# Patient Record
Sex: MALE | Race: OTHER | Employment: OTHER
[De-identification: names, ages, dates, MRNs, and addresses within clinical notes are randomized per-mention and may not be internally consistent; named-entity substitution may affect disease eponyms.]

---

## 2017-02-01 ENCOUNTER — OFFICE VISIT (OUTPATIENT)
Dept: FAMILY MEDICINE CLINIC | Facility: CLINIC | Age: 67
End: 2017-02-01

## 2017-02-01 VITALS
WEIGHT: 190 LBS | HEIGHT: 68 IN | BODY MASS INDEX: 28.79 KG/M2 | DIASTOLIC BLOOD PRESSURE: 73 MMHG | SYSTOLIC BLOOD PRESSURE: 148 MMHG | HEART RATE: 105 BPM

## 2017-02-01 DIAGNOSIS — E11.42 DIABETIC POLYNEUROPATHY ASSOCIATED WITH TYPE 2 DIABETES MELLITUS (HCC): ICD-10-CM

## 2017-02-01 DIAGNOSIS — N18.6 TYPE 2 DIABETES MELLITUS WITH CHRONIC KIDNEY DISEASE ON CHRONIC DIALYSIS, UNSPECIFIED LONG TERM INSULIN USE STATUS: ICD-10-CM

## 2017-02-01 DIAGNOSIS — Z99.2 TYPE 2 DIABETES MELLITUS WITH CHRONIC KIDNEY DISEASE ON CHRONIC DIALYSIS, WITHOUT LONG-TERM CURRENT USE OF INSULIN (HCC): ICD-10-CM

## 2017-02-01 DIAGNOSIS — Z09 HOSPITAL DISCHARGE FOLLOW-UP: ICD-10-CM

## 2017-02-01 DIAGNOSIS — E11.22 TYPE 2 DIABETES MELLITUS WITH CHRONIC KIDNEY DISEASE ON CHRONIC DIALYSIS, WITHOUT LONG-TERM CURRENT USE OF INSULIN (HCC): ICD-10-CM

## 2017-02-01 DIAGNOSIS — F25.1 SCHIZOAFFECTIVE DISORDER, DEPRESSIVE TYPE (HCC): ICD-10-CM

## 2017-02-01 DIAGNOSIS — G40.909 SEIZURE DISORDER (HCC): ICD-10-CM

## 2017-02-01 DIAGNOSIS — N18.4 CHRONIC KIDNEY DISEASE (CKD), STAGE IV (SEVERE) (HCC): ICD-10-CM

## 2017-02-01 DIAGNOSIS — I10 ESSENTIAL HYPERTENSION: Primary | ICD-10-CM

## 2017-02-01 DIAGNOSIS — Z99.2 TYPE 2 DIABETES MELLITUS WITH CHRONIC KIDNEY DISEASE ON CHRONIC DIALYSIS, UNSPECIFIED LONG TERM INSULIN USE STATUS: ICD-10-CM

## 2017-02-01 DIAGNOSIS — G71.11 PROXIMAL MYOTONIC MYOPATHY (HCC): ICD-10-CM

## 2017-02-01 DIAGNOSIS — E11.22 TYPE 2 DIABETES MELLITUS WITH CHRONIC KIDNEY DISEASE ON CHRONIC DIALYSIS, UNSPECIFIED LONG TERM INSULIN USE STATUS: ICD-10-CM

## 2017-02-01 DIAGNOSIS — R26.81 UNSTABLE GAIT: ICD-10-CM

## 2017-02-01 DIAGNOSIS — E78.2 MIXED HYPERLIPIDEMIA: ICD-10-CM

## 2017-02-01 DIAGNOSIS — E66.09 NON MORBID OBESITY DUE TO EXCESS CALORIES: ICD-10-CM

## 2017-02-01 DIAGNOSIS — D64.9 NORMOCHROMIC NORMOCYTIC ANEMIA: ICD-10-CM

## 2017-02-01 DIAGNOSIS — C64.1 RENAL CELL CARCINOMA, RIGHT (HCC): ICD-10-CM

## 2017-02-01 DIAGNOSIS — N18.6 ESRD ON HEMODIALYSIS (HCC): ICD-10-CM

## 2017-02-01 DIAGNOSIS — N18.6 TYPE 2 DIABETES MELLITUS WITH CHRONIC KIDNEY DISEASE ON CHRONIC DIALYSIS, WITHOUT LONG-TERM CURRENT USE OF INSULIN (HCC): ICD-10-CM

## 2017-02-01 DIAGNOSIS — Z99.2 ESRD ON HEMODIALYSIS (HCC): ICD-10-CM

## 2017-02-01 PROCEDURE — G8482 FLU IMMUNIZE ORDER/ADMIN: HCPCS | Performed by: FAMILY MEDICINE

## 2017-02-01 PROCEDURE — 4040F PNEUMOC VAC/ADMIN/RCVD: CPT | Performed by: FAMILY MEDICINE

## 2017-02-01 PROCEDURE — 3044F HG A1C LEVEL LT 7.0%: CPT | Performed by: FAMILY MEDICINE

## 2017-02-01 PROCEDURE — G8427 DOCREV CUR MEDS BY ELIG CLIN: HCPCS | Performed by: FAMILY MEDICINE

## 2017-02-01 PROCEDURE — G8420 CALC BMI NORM PARAMETERS: HCPCS | Performed by: FAMILY MEDICINE

## 2017-02-01 PROCEDURE — 1036F TOBACCO NON-USER: CPT | Performed by: FAMILY MEDICINE

## 2017-02-01 PROCEDURE — 3017F COLORECTAL CA SCREEN DOC REV: CPT | Performed by: FAMILY MEDICINE

## 2017-02-01 PROCEDURE — G8598 ASA/ANTIPLAT THER USED: HCPCS | Performed by: FAMILY MEDICINE

## 2017-02-01 PROCEDURE — 99214 OFFICE O/P EST MOD 30 MIN: CPT | Performed by: FAMILY MEDICINE

## 2017-02-01 PROCEDURE — 1123F ACP DISCUSS/DSCN MKR DOCD: CPT | Performed by: FAMILY MEDICINE

## 2017-02-01 ASSESSMENT — ENCOUNTER SYMPTOMS
CHEST TIGHTNESS: 0
NAUSEA: 0
SORE THROAT: 0
ABDOMINAL PAIN: 0
BACK PAIN: 0
RHINORRHEA: 0
TROUBLE SWALLOWING: 0
DIARRHEA: 0
CONSTIPATION: 0
SHORTNESS OF BREATH: 0
COUGH: 0

## 2017-02-01 ASSESSMENT — PATIENT HEALTH QUESTIONNAIRE - PHQ9
SUM OF ALL RESPONSES TO PHQ QUESTIONS 1-9: 0
2. FEELING DOWN, DEPRESSED OR HOPELESS: 0
SUM OF ALL RESPONSES TO PHQ9 QUESTIONS 1 & 2: 0
1. LITTLE INTEREST OR PLEASURE IN DOING THINGS: 0

## 2017-02-14 LAB
BASOPHILS ABSOLUTE: 0 /ΜL
BASOPHILS RELATIVE PERCENT: 0.9 %
CHOLESTEROL, TOTAL: 105 MG/DL
CHOLESTEROL/HDL RATIO: 2.1
EOSINOPHILS ABSOLUTE: 0.2 /ΜL
EOSINOPHILS RELATIVE PERCENT: 4.1 %
HBA1C MFR BLD: 5 %
HCT VFR BLD CALC: 31.2 % (ref 41–53)
HDLC SERPL-MCNC: 49 MG/DL (ref 35–70)
HEMOGLOBIN: 10.7 G/DL (ref 13.5–17.5)
LDL CHOLESTEROL CALCULATED: 37 MG/DL (ref 0–160)
LYMPHOCYTES ABSOLUTE: 1.2 /ΜL
LYMPHOCYTES RELATIVE PERCENT: 27.9 %
MCH RBC QN AUTO: 33.4 PG
MCHC RBC AUTO-ENTMCNC: 34.3 G/DL
MCV RBC AUTO: 98 FL
MONOCYTES ABSOLUTE: 0.6 /ΜL
MONOCYTES RELATIVE PERCENT: 13 %
NEUTROPHILS ABSOLUTE: 2.4 /ΜL
NEUTROPHILS RELATIVE PERCENT: 54.1 %
PDW BLD-RTO: 14.8 %
PLATELET # BLD: 145 K/ΜL
PMV BLD AUTO: 9 FL
RBC # BLD: 3.2 10^6/ΜL
TRIGL SERPL-MCNC: 96 MG/DL
VLDLC SERPL CALC-MCNC: 19 MG/DL
WBC # BLD: 4.5 10^3/ML

## 2017-02-16 DIAGNOSIS — I10 ESSENTIAL HYPERTENSION: ICD-10-CM

## 2017-02-16 DIAGNOSIS — Z99.2 TYPE 2 DIABETES MELLITUS WITH CHRONIC KIDNEY DISEASE ON CHRONIC DIALYSIS, UNSPECIFIED LONG TERM INSULIN USE STATUS: ICD-10-CM

## 2017-02-16 DIAGNOSIS — E78.2 MIXED HYPERLIPIDEMIA: ICD-10-CM

## 2017-02-16 DIAGNOSIS — N18.6 TYPE 2 DIABETES MELLITUS WITH CHRONIC KIDNEY DISEASE ON CHRONIC DIALYSIS, UNSPECIFIED LONG TERM INSULIN USE STATUS: ICD-10-CM

## 2017-02-16 DIAGNOSIS — E11.22 TYPE 2 DIABETES MELLITUS WITH CHRONIC KIDNEY DISEASE ON CHRONIC DIALYSIS, UNSPECIFIED LONG TERM INSULIN USE STATUS: ICD-10-CM

## 2017-02-28 RX ORDER — RIVASTIGMINE 4.6 MG/24H
PATCH, EXTENDED RELEASE TRANSDERMAL
Qty: 30 PATCH | Refills: 0 | Status: SHIPPED | OUTPATIENT
Start: 2017-02-28 | End: 2017-04-01 | Stop reason: SDUPTHER

## 2017-03-15 ENCOUNTER — HOSPITAL ENCOUNTER (OUTPATIENT)
Dept: GENERAL RADIOLOGY | Age: 67
Discharge: HOME | End: 2017-03-15
Payer: MEDICARE

## 2017-03-15 ENCOUNTER — HOSPITAL ENCOUNTER (OUTPATIENT)
Dept: CT IMAGING | Age: 67
Discharge: HOME | End: 2017-03-15
Payer: MEDICARE

## 2017-03-15 DIAGNOSIS — R13.10 PAIN WITH SWALLOWING: ICD-10-CM

## 2017-03-15 DIAGNOSIS — R13.10 PAINFUL SWALLOWING: ICD-10-CM

## 2017-03-15 DIAGNOSIS — K22.2 STRICTURE ESOPHAGUS: ICD-10-CM

## 2017-03-15 LAB
BUN BLDV-MCNC: 21 MG/DL (ref 8–23)
CREAT SERPL-MCNC: 4.48 MG/DL (ref 0.7–1.2)
GFR AFRICAN AMERICAN: 16 ML/MIN
GFR NON-AFRICAN AMERICAN: 13 ML/MIN
GFR SERPL CREATININE-BSD FRML MDRD: ABNORMAL ML/MIN/{1.73_M2}
GFR SERPL CREATININE-BSD FRML MDRD: ABNORMAL ML/MIN/{1.73_M2}

## 2017-03-15 PROCEDURE — 36415 COLL VENOUS BLD VENIPUNCTURE: CPT

## 2017-03-15 PROCEDURE — 71250 CT THORAX DX C-: CPT

## 2017-03-15 PROCEDURE — 82565 ASSAY OF CREATININE: CPT

## 2017-03-15 PROCEDURE — 84520 ASSAY OF UREA NITROGEN: CPT

## 2017-03-15 PROCEDURE — 6360000004 HC RX CONTRAST MEDICATION: Performed by: INTERNAL MEDICINE

## 2017-03-15 PROCEDURE — 74150 CT ABDOMEN W/O CONTRAST: CPT

## 2017-03-15 RX ORDER — 0.9 % SODIUM CHLORIDE 0.9 %
100 INTRAVENOUS SOLUTION INTRAVENOUS ONCE
Status: DISCONTINUED | OUTPATIENT
Start: 2017-03-15 | End: 2017-03-15

## 2017-03-15 RX ORDER — SODIUM CHLORIDE 0.9 % (FLUSH) 0.9 %
10 SYRINGE (ML) INJECTION PRN
Status: DISCONTINUED | OUTPATIENT
Start: 2017-03-15 | End: 2017-03-15

## 2017-03-15 RX ADMIN — IOHEXOL 50 ML: 240 INJECTION, SOLUTION INTRATHECAL; INTRAVASCULAR; INTRAVENOUS; ORAL at 10:09

## 2017-04-03 RX ORDER — RIVASTIGMINE 4.6 MG/24H
PATCH, EXTENDED RELEASE TRANSDERMAL
Qty: 30 PATCH | Refills: 0 | Status: SHIPPED | OUTPATIENT
Start: 2017-04-03 | End: 2017-07-18 | Stop reason: SDUPTHER

## 2017-04-19 ENCOUNTER — TELEPHONE (OUTPATIENT)
Dept: FAMILY MEDICINE CLINIC | Age: 67
End: 2017-04-19

## 2017-06-14 ENCOUNTER — TELEPHONE (OUTPATIENT)
Dept: FAMILY MEDICINE CLINIC | Age: 67
End: 2017-06-14

## 2017-06-19 ENCOUNTER — OFFICE VISIT (OUTPATIENT)
Dept: FAMILY MEDICINE CLINIC | Age: 67
End: 2017-06-19
Payer: MEDICARE

## 2017-06-19 VITALS
HEART RATE: 93 BPM | SYSTOLIC BLOOD PRESSURE: 120 MMHG | BODY MASS INDEX: 29.73 KG/M2 | WEIGHT: 185 LBS | HEIGHT: 66 IN | DIASTOLIC BLOOD PRESSURE: 60 MMHG

## 2017-06-19 DIAGNOSIS — E11.22 TYPE 2 DIABETES MELLITUS WITH CHRONIC KIDNEY DISEASE ON CHRONIC DIALYSIS, WITHOUT LONG-TERM CURRENT USE OF INSULIN (HCC): ICD-10-CM

## 2017-06-19 DIAGNOSIS — L24.9 IRRITANT DERMATITIS: ICD-10-CM

## 2017-06-19 DIAGNOSIS — R53.1 GENERALIZED WEAKNESS: ICD-10-CM

## 2017-06-19 DIAGNOSIS — Z99.2 TYPE 2 DIABETES MELLITUS WITH CHRONIC KIDNEY DISEASE ON CHRONIC DIALYSIS, WITHOUT LONG-TERM CURRENT USE OF INSULIN (HCC): ICD-10-CM

## 2017-06-19 DIAGNOSIS — E66.09 NON MORBID OBESITY DUE TO EXCESS CALORIES: ICD-10-CM

## 2017-06-19 DIAGNOSIS — N18.6 TYPE 2 DIABETES MELLITUS WITH CHRONIC KIDNEY DISEASE ON CHRONIC DIALYSIS, WITHOUT LONG-TERM CURRENT USE OF INSULIN (HCC): ICD-10-CM

## 2017-06-19 DIAGNOSIS — R26.81 UNSTABLE GAIT: ICD-10-CM

## 2017-06-19 DIAGNOSIS — L85.3 DRY SKIN DERMATITIS: ICD-10-CM

## 2017-06-19 DIAGNOSIS — L29.9 PRURITUS: Primary | ICD-10-CM

## 2017-06-19 DIAGNOSIS — C64.1 RENAL CELL CARCINOMA, RIGHT (HCC): ICD-10-CM

## 2017-06-19 PROCEDURE — G8427 DOCREV CUR MEDS BY ELIG CLIN: HCPCS | Performed by: FAMILY MEDICINE

## 2017-06-19 PROCEDURE — 99213 OFFICE O/P EST LOW 20 MIN: CPT | Performed by: FAMILY MEDICINE

## 2017-06-19 PROCEDURE — 3046F HEMOGLOBIN A1C LEVEL >9.0%: CPT | Performed by: FAMILY MEDICINE

## 2017-06-19 PROCEDURE — G8419 CALC BMI OUT NRM PARAM NOF/U: HCPCS | Performed by: FAMILY MEDICINE

## 2017-06-19 PROCEDURE — 1036F TOBACCO NON-USER: CPT | Performed by: FAMILY MEDICINE

## 2017-06-19 PROCEDURE — 4040F PNEUMOC VAC/ADMIN/RCVD: CPT | Performed by: FAMILY MEDICINE

## 2017-06-19 PROCEDURE — 3017F COLORECTAL CA SCREEN DOC REV: CPT | Performed by: FAMILY MEDICINE

## 2017-06-19 PROCEDURE — 1123F ACP DISCUSS/DSCN MKR DOCD: CPT | Performed by: FAMILY MEDICINE

## 2017-06-19 PROCEDURE — G8598 ASA/ANTIPLAT THER USED: HCPCS | Performed by: FAMILY MEDICINE

## 2017-06-19 RX ORDER — MIRTAZAPINE 45 MG/1
45 TABLET, FILM COATED ORAL DAILY
Status: ON HOLD | COMMUNITY
Start: 2017-05-25 | End: 2017-09-06 | Stop reason: HOSPADM

## 2017-06-19 RX ORDER — HYDROXYZINE HYDROCHLORIDE 25 MG/1
25 TABLET, FILM COATED ORAL NIGHTLY PRN
Qty: 30 TABLET | Refills: 1 | Status: SHIPPED | OUTPATIENT
Start: 2017-06-19 | End: 2017-06-29

## 2017-06-19 RX ORDER — CITALOPRAM 40 MG/1
40 TABLET ORAL DAILY
Status: ON HOLD | COMMUNITY
Start: 2017-05-25 | End: 2018-02-11 | Stop reason: HOSPADM

## 2017-06-19 RX ORDER — ZIPRASIDONE HYDROCHLORIDE 40 MG/1
40 CAPSULE ORAL DAILY
Status: ON HOLD | COMMUNITY
Start: 2017-05-25 | End: 2017-09-06 | Stop reason: HOSPADM

## 2017-06-19 ASSESSMENT — PATIENT HEALTH QUESTIONNAIRE - PHQ9
1. LITTLE INTEREST OR PLEASURE IN DOING THINGS: 0
SUM OF ALL RESPONSES TO PHQ9 QUESTIONS 1 & 2: 0
2. FEELING DOWN, DEPRESSED OR HOPELESS: 0
SUM OF ALL RESPONSES TO PHQ QUESTIONS 1-9: 0

## 2017-06-26 ENCOUNTER — TELEPHONE (OUTPATIENT)
Dept: FAMILY MEDICINE CLINIC | Age: 67
End: 2017-06-26

## 2017-06-28 ENCOUNTER — TELEPHONE (OUTPATIENT)
Dept: FAMILY MEDICINE CLINIC | Age: 67
End: 2017-06-28

## 2017-06-29 ENCOUNTER — HOSPITAL ENCOUNTER (EMERGENCY)
Age: 67
Discharge: HOME | End: 2017-06-29
Attending: EMERGENCY MEDICINE
Payer: MEDICARE

## 2017-06-29 ENCOUNTER — APPOINTMENT (OUTPATIENT)
Dept: CT IMAGING | Age: 67
End: 2017-06-29
Payer: MEDICARE

## 2017-06-29 VITALS
BODY MASS INDEX: 28.93 KG/M2 | SYSTOLIC BLOOD PRESSURE: 172 MMHG | RESPIRATION RATE: 12 BRPM | TEMPERATURE: 98.4 F | DIASTOLIC BLOOD PRESSURE: 67 MMHG | HEART RATE: 88 BPM | WEIGHT: 180 LBS | OXYGEN SATURATION: 91 % | HEIGHT: 66 IN

## 2017-06-29 DIAGNOSIS — R11.2 NAUSEA AND VOMITING, INTRACTABILITY OF VOMITING NOT SPECIFIED, UNSPECIFIED VOMITING TYPE: Primary | ICD-10-CM

## 2017-06-29 DIAGNOSIS — K59.00 CONSTIPATION, UNSPECIFIED CONSTIPATION TYPE: ICD-10-CM

## 2017-06-29 PROCEDURE — 99284 EMERGENCY DEPT VISIT MOD MDM: CPT

## 2017-06-29 PROCEDURE — 74176 CT ABD & PELVIS W/O CONTRAST: CPT

## 2017-06-29 PROCEDURE — 96374 THER/PROPH/DIAG INJ IV PUSH: CPT

## 2017-06-29 RX ORDER — ONDANSETRON 4 MG/1
4 TABLET, ORALLY DISINTEGRATING ORAL EVERY 8 HOURS PRN
Qty: 10 TABLET | Refills: 0 | Status: SHIPPED | OUTPATIENT
Start: 2017-06-29 | End: 2017-07-17 | Stop reason: SDUPTHER

## 2017-06-29 RX ORDER — POLYETHYLENE GLYCOL 3350 17 G/17G
17 POWDER, FOR SOLUTION ORAL DAILY
Qty: 1 BOTTLE | Refills: 0 | Status: SHIPPED | OUTPATIENT
Start: 2017-06-29 | End: 2017-07-06

## 2017-06-29 RX ORDER — ONDANSETRON 2 MG/ML
4 INJECTION INTRAMUSCULAR; INTRAVENOUS ONCE
Status: DISCONTINUED | OUTPATIENT
Start: 2017-06-29 | End: 2017-06-29 | Stop reason: HOSPADM

## 2017-06-29 ASSESSMENT — ENCOUNTER SYMPTOMS
DIARRHEA: 0
EYE DISCHARGE: 0
CONSTIPATION: 1
BACK PAIN: 0
EYE PAIN: 0
RHINORRHEA: 0
NAUSEA: 1
ABDOMINAL PAIN: 0
VOMITING: 1
COUGH: 0
SHORTNESS OF BREATH: 0
EYE REDNESS: 0

## 2017-06-30 ENCOUNTER — CARE COORDINATION (OUTPATIENT)
Dept: FAMILY MEDICINE CLINIC | Age: 67
End: 2017-06-30

## 2017-07-05 DIAGNOSIS — L24.9 IRRITANT DERMATITIS: ICD-10-CM

## 2017-07-05 DIAGNOSIS — L29.9 PRURITUS: ICD-10-CM

## 2017-07-05 DIAGNOSIS — L85.3 DRY SKIN DERMATITIS: ICD-10-CM

## 2017-07-12 ENCOUNTER — TELEPHONE (OUTPATIENT)
Dept: FAMILY MEDICINE CLINIC | Age: 67
End: 2017-07-12

## 2017-07-13 ASSESSMENT — ENCOUNTER SYMPTOMS
NAUSEA: 0
COUGH: 1
ABDOMINAL PAIN: 0
BACK PAIN: 0
DIARRHEA: 0
SORE THROAT: 0
RHINORRHEA: 0
SHORTNESS OF BREATH: 0
TROUBLE SWALLOWING: 0
CONSTIPATION: 0
CHEST TIGHTNESS: 0

## 2017-07-17 ENCOUNTER — TELEPHONE (OUTPATIENT)
Dept: FAMILY MEDICINE CLINIC | Age: 67
End: 2017-07-17

## 2017-07-17 ENCOUNTER — OFFICE VISIT (OUTPATIENT)
Dept: FAMILY MEDICINE CLINIC | Age: 67
End: 2017-07-17
Payer: MEDICARE

## 2017-07-17 VITALS
BODY MASS INDEX: 27.46 KG/M2 | DIASTOLIC BLOOD PRESSURE: 77 MMHG | HEIGHT: 68 IN | SYSTOLIC BLOOD PRESSURE: 169 MMHG | WEIGHT: 181.2 LBS | TEMPERATURE: 98.2 F | HEART RATE: 83 BPM
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
## 2017-07-17 DIAGNOSIS — L24.9 IRRITANT DERMATITIS: ICD-10-CM

## 2017-07-17 DIAGNOSIS — R26.81 UNSTABLE GAIT: ICD-10-CM

## 2017-07-17 DIAGNOSIS — R11.0 NAUSEA: ICD-10-CM

## 2017-07-17 DIAGNOSIS — N18.6 TYPE 2 DIABETES MELLITUS WITH CHRONIC KIDNEY DISEASE ON CHRONIC DIALYSIS, WITHOUT LONG-TERM CURRENT USE OF INSULIN (HCC): ICD-10-CM

## 2017-07-17 DIAGNOSIS — N18.6 ESRD ON HEMODIALYSIS (HCC): ICD-10-CM

## 2017-07-17 DIAGNOSIS — S30.0XXA HEMATOMA OF LUMBAR MUSCLE, INITIAL ENCOUNTER: ICD-10-CM

## 2017-07-17 DIAGNOSIS — Z99.2 ESRD ON HEMODIALYSIS (HCC): ICD-10-CM

## 2017-07-17 DIAGNOSIS — E11.22 TYPE 2 DIABETES MELLITUS WITH CHRONIC KIDNEY DISEASE ON CHRONIC DIALYSIS, WITHOUT LONG-TERM CURRENT USE OF INSULIN (HCC): ICD-10-CM

## 2017-07-17 DIAGNOSIS — Z99.2 TYPE 2 DIABETES MELLITUS WITH CHRONIC KIDNEY DISEASE ON CHRONIC DIALYSIS, WITHOUT LONG-TERM CURRENT USE OF INSULIN (HCC): ICD-10-CM

## 2017-07-17 DIAGNOSIS — L29.9 PRURITUS: ICD-10-CM

## 2017-07-17 DIAGNOSIS — H54.7 VISION IMPAIRMENT: ICD-10-CM

## 2017-07-17 DIAGNOSIS — L85.3 DRY SKIN DERMATITIS: ICD-10-CM

## 2017-07-17 PROCEDURE — 1036F TOBACCO NON-USER: CPT | Performed by: FAMILY MEDICINE

## 2017-07-17 PROCEDURE — G8427 DOCREV CUR MEDS BY ELIG CLIN: HCPCS | Performed by: FAMILY MEDICINE

## 2017-07-17 PROCEDURE — G8419 CALC BMI OUT NRM PARAM NOF/U: HCPCS | Performed by: FAMILY MEDICINE

## 2017-07-17 PROCEDURE — G8598 ASA/ANTIPLAT THER USED: HCPCS | Performed by: FAMILY MEDICINE

## 2017-07-17 PROCEDURE — 3046F HEMOGLOBIN A1C LEVEL >9.0%: CPT | Performed by: FAMILY MEDICINE

## 2017-07-17 PROCEDURE — 99214 OFFICE O/P EST MOD 30 MIN: CPT | Performed by: FAMILY MEDICINE

## 2017-07-17 PROCEDURE — 1123F ACP DISCUSS/DSCN MKR DOCD: CPT | Performed by: FAMILY MEDICINE

## 2017-07-17 PROCEDURE — 3017F COLORECTAL CA SCREEN DOC REV: CPT | Performed by: FAMILY MEDICINE

## 2017-07-17 PROCEDURE — 4040F PNEUMOC VAC/ADMIN/RCVD: CPT | Performed by: FAMILY MEDICINE

## 2017-07-17 RX ORDER — HYDROXYZINE HYDROCHLORIDE 25 MG/1
25 TABLET, FILM COATED ORAL NIGHTLY PRN
Status: ON HOLD | COMMUNITY
End: 2017-09-06 | Stop reason: HOSPADM

## 2017-07-17 RX ORDER — POLYETHYLENE GLYCOL 3350 17 G/17G
17 POWDER, FOR SOLUTION ORAL DAILY
COMMUNITY
End: 2017-11-15 | Stop reason: ALTCHOICE

## 2017-07-17 RX ORDER — MEGESTROL ACETATE 40 MG/ML
SUSPENSION ORAL DAILY
Status: ON HOLD | COMMUNITY
End: 2018-02-11 | Stop reason: HOSPADM

## 2017-07-17 RX ORDER — ONDANSETRON 4 MG/1
4 TABLET, ORALLY DISINTEGRATING ORAL EVERY 8 HOURS PRN
Qty: 20 TABLET | Refills: 2 | Status: ON HOLD | OUTPATIENT
Start: 2017-07-17 | End: 2018-02-11 | Stop reason: HOSPADM

## 2017-07-17 RX ORDER — OMEPRAZOLE 20 MG/1
20 CAPSULE, DELAYED RELEASE ORAL DAILY
Qty: 30 CAPSULE | Refills: 3 | Status: SHIPPED | OUTPATIENT
Start: 2017-07-17 | End: 2017-12-15 | Stop reason: SDUPTHER

## 2017-07-19 RX ORDER — RIVASTIGMINE 4.6 MG/24H
PATCH, EXTENDED RELEASE TRANSDERMAL
Qty: 30 PATCH | Refills: 0 | Status: SHIPPED | OUTPATIENT
Start: 2017-07-19 | End: 2017-12-20 | Stop reason: SDUPTHER

## 2017-07-24 ENCOUNTER — TELEPHONE (OUTPATIENT)
Dept: FAMILY MEDICINE CLINIC | Age: 67
End: 2017-07-24

## 2017-07-29 ASSESSMENT — ENCOUNTER SYMPTOMS
TROUBLE SWALLOWING: 0
COUGH: 0
SORE THROAT: 0
RHINORRHEA: 0
ABDOMINAL PAIN: 0
CHEST TIGHTNESS: 0
DIARRHEA: 0
BACK PAIN: 1
SHORTNESS OF BREATH: 1
NAUSEA: 0
CONSTIPATION: 0

## 2017-08-01 ENCOUNTER — APPOINTMENT (OUTPATIENT)
Dept: CT IMAGING | Age: 67
DRG: 100 | End: 2017-08-01
Payer: MEDICARE

## 2017-08-01 ENCOUNTER — APPOINTMENT (OUTPATIENT)
Dept: GENERAL RADIOLOGY | Age: 67
DRG: 100 | End: 2017-08-01
Payer: MEDICARE

## 2017-08-01 ENCOUNTER — HOSPITAL ENCOUNTER (INPATIENT)
Age: 67
LOS: 2 days | Discharge: NURSING, REHAB OR LONG-TERM CARE | DRG: 100 | End: 2017-08-04
Attending: EMERGENCY MEDICINE | Admitting: INTERNAL MEDICINE
Payer: MEDICARE

## 2017-08-01 ENCOUNTER — APPOINTMENT (OUTPATIENT)
Dept: MRI IMAGING | Age: 67
DRG: 100 | End: 2017-08-01
Payer: MEDICARE

## 2017-08-01 DIAGNOSIS — R41.82 ALTERED MENTAL STATUS, UNSPECIFIED ALTERED MENTAL STATUS TYPE: Primary | ICD-10-CM

## 2017-08-01 LAB
ABSOLUTE EOS #: 0.1 K/UL (ref 0–0.4)
ABSOLUTE LYMPH #: 0.5 K/UL (ref 1–4.8)
ABSOLUTE MONO #: 0.4 K/UL (ref 0.1–1.2)
ALBUMIN SERPL-MCNC: 4.2 G/DL (ref 3.5–5.2)
ALBUMIN/GLOBULIN RATIO: 1.3 (ref 1–2.5)
ALLEN TEST: ABNORMAL
ALP BLD-CCNC: 148 U/L (ref 40–129)
ALT SERPL-CCNC: 6 U/L (ref 5–41)
ANION GAP SERPL CALCULATED.3IONS-SCNC: 17 MMOL/L (ref 9–17)
ANION GAP: 6 MMOL/L (ref 7–16)
AST SERPL-CCNC: 18 U/L
BASOPHILS # BLD: 2 %
BASOPHILS ABSOLUTE: 0.1 K/UL (ref 0–0.2)
BILIRUB SERPL-MCNC: 0.48 MG/DL (ref 0.3–1.2)
BUN BLDV-MCNC: 10 MG/DL (ref 8–23)
BUN/CREAT BLD: ABNORMAL (ref 9–20)
CALCIUM SERPL-MCNC: 9.8 MG/DL (ref 8.6–10.4)
CHLORIDE BLD-SCNC: 97 MMOL/L (ref 98–107)
CO2: 29 MMOL/L (ref 20–31)
CREAT SERPL-MCNC: 2.76 MG/DL (ref 0.7–1.2)
DIFFERENTIAL TYPE: ABNORMAL
EOSINOPHILS RELATIVE PERCENT: 4 %
FIO2: ABNORMAL
FOLATE: >20 NG/ML
GFR AFRICAN AMERICAN: 28 ML/MIN
GFR NON-AFRICAN AMERICAN: 23 ML/MIN
GFR NON-AFRICAN AMERICAN: NORMAL ML/MIN
GFR SERPL CREATININE-BSD FRML MDRD: ABNORMAL ML/MIN/{1.73_M2}
GFR SERPL CREATININE-BSD FRML MDRD: ABNORMAL ML/MIN/{1.73_M2}
GFR SERPL CREATININE-BSD FRML MDRD: NORMAL ML/MIN
GFR SERPL CREATININE-BSD FRML MDRD: NORMAL ML/MIN/{1.73_M2}
GLUCOSE BLD-MCNC: 87 MG/DL (ref 74–100)
GLUCOSE BLD-MCNC: 88 MG/DL (ref 70–99)
HCO3 VENOUS: 32.2 MMOL/L (ref 22–29)
HCT VFR BLD CALC: 35 % (ref 41–53)
HEMOGLOBIN: 11.6 G/DL (ref 13.5–17.5)
INR BLD: 1
LYMPHOCYTES # BLD: 16 %
MCH RBC QN AUTO: 31.6 PG (ref 26–34)
MCHC RBC AUTO-ENTMCNC: 33.3 G/DL (ref 31–37)
MCV RBC AUTO: 94.9 FL (ref 80–100)
MODE: ABNORMAL
MONOCYTES # BLD: 11 %
NEGATIVE BASE EXCESS, VEN: ABNORMAL (ref 0–2)
O2 DEVICE/FLOW/%: ABNORMAL
O2 SAT, VEN: 71 % (ref 60–85)
PATIENT TEMP: ABNORMAL
PCO2, VEN: 57.7 MM HG (ref 41–51)
PDW BLD-RTO: 16.3 % (ref 12.5–15.4)
PH VENOUS: 7.35 (ref 7.32–7.43)
PHENYTOIN FREE: 0.3 UG/ML (ref 1–2)
PLATELET # BLD: 144 K/UL (ref 140–450)
PLATELET ESTIMATE: ABNORMAL
PMV BLD AUTO: 8.1 FL (ref 6–12)
PO2, VEN: 40 MM HG (ref 30–50)
POC CHLORIDE: 99 MMOL/L (ref 98–107)
POC CREATININE: NORMAL MG/DL (ref 0.51–1.19)
POC HEMATOCRIT: 37 % (ref 41–53)
POC HEMOGLOBIN: 12.5 G/DL (ref 13.5–17.5)
POC IONIZED CALCIUM: 1.15 MMOL/L (ref 1.15–1.33)
POC LACTIC ACID: 1.21 MMOL/L (ref 0.56–1.39)
POC PCO2 TEMP: ABNORMAL MM HG
POC PH TEMP: ABNORMAL
POC PO2 TEMP: ABNORMAL MM HG
POC POTASSIUM: 4.2 MMOL/L (ref 3.5–4.5)
POC SODIUM: 137 MMOL/L (ref 138–146)
POSITIVE BASE EXCESS, VEN: 5 (ref 0–3)
POTASSIUM SERPL-SCNC: 4.7 MMOL/L (ref 3.7–5.3)
PROTHROMBIN TIME: 11.1 SEC (ref 9.4–12.6)
RBC # BLD: 3.68 M/UL (ref 4.5–5.9)
RBC # BLD: ABNORMAL 10*6/UL
SAMPLE SITE: ABNORMAL
SEDIMENTATION RATE, ERYTHROCYTE: 12 MM (ref 0–10)
SEG NEUTROPHILS: 67 %
SEGMENTED NEUTROPHILS ABSOLUTE COUNT: 2.3 K/UL (ref 1.8–7.7)
SODIUM BLD-SCNC: 143 MMOL/L (ref 135–144)
T. PALLIDUM, IGG: NONREACTIVE
TOTAL CO2, VENOUS: 34 MMOL/L (ref 23–30)
TOTAL PROTEIN: 7.5 G/DL (ref 6.4–8.3)
TSH SERPL DL<=0.05 MIU/L-ACNC: 1.78 MIU/L (ref 0.3–5)
VITAMIN B-12: 794 PG/ML (ref 211–946)
WBC # BLD: 3.4 K/UL (ref 3.5–11)
WBC # BLD: ABNORMAL 10*3/UL

## 2017-08-01 PROCEDURE — 80186 ASSAY OF PHENYTOIN FREE: CPT

## 2017-08-01 PROCEDURE — 71010 XR CHEST PORTABLE: CPT

## 2017-08-01 PROCEDURE — 85610 PROTHROMBIN TIME: CPT

## 2017-08-01 PROCEDURE — 82803 BLOOD GASES ANY COMBINATION: CPT

## 2017-08-01 PROCEDURE — 72141 MRI NECK SPINE W/O DYE: CPT

## 2017-08-01 PROCEDURE — 86780 TREPONEMA PALLIDUM: CPT

## 2017-08-01 PROCEDURE — G0378 HOSPITAL OBSERVATION PER HR: HCPCS

## 2017-08-01 PROCEDURE — 82435 ASSAY OF BLOOD CHLORIDE: CPT

## 2017-08-01 PROCEDURE — 85651 RBC SED RATE NONAUTOMATED: CPT

## 2017-08-01 PROCEDURE — 84295 ASSAY OF SERUM SODIUM: CPT

## 2017-08-01 PROCEDURE — 85025 COMPLETE CBC W/AUTO DIFF WBC: CPT

## 2017-08-01 PROCEDURE — 70450 CT HEAD/BRAIN W/O DYE: CPT

## 2017-08-01 PROCEDURE — 95819 EEG AWAKE AND ASLEEP: CPT

## 2017-08-01 PROCEDURE — 6370000000 HC RX 637 (ALT 250 FOR IP): Performed by: INTERNAL MEDICINE

## 2017-08-01 PROCEDURE — 80053 COMPREHEN METABOLIC PANEL: CPT

## 2017-08-01 PROCEDURE — 99285 EMERGENCY DEPT VISIT HI MDM: CPT

## 2017-08-01 PROCEDURE — 84443 ASSAY THYROID STIM HORMONE: CPT

## 2017-08-01 PROCEDURE — 82947 ASSAY GLUCOSE BLOOD QUANT: CPT

## 2017-08-01 PROCEDURE — 82330 ASSAY OF CALCIUM: CPT

## 2017-08-01 PROCEDURE — 93005 ELECTROCARDIOGRAM TRACING: CPT

## 2017-08-01 PROCEDURE — 83605 ASSAY OF LACTIC ACID: CPT

## 2017-08-01 PROCEDURE — 84132 ASSAY OF SERUM POTASSIUM: CPT

## 2017-08-01 PROCEDURE — 70551 MRI BRAIN STEM W/O DYE: CPT

## 2017-08-01 PROCEDURE — 99222 1ST HOSP IP/OBS MODERATE 55: CPT | Performed by: NURSE PRACTITIONER

## 2017-08-01 PROCEDURE — 82565 ASSAY OF CREATININE: CPT

## 2017-08-01 PROCEDURE — 6360000002 HC RX W HCPCS: Performed by: INTERNAL MEDICINE

## 2017-08-01 PROCEDURE — 82607 VITAMIN B-12: CPT

## 2017-08-01 PROCEDURE — 82746 ASSAY OF FOLIC ACID SERUM: CPT

## 2017-08-01 PROCEDURE — 85014 HEMATOCRIT: CPT

## 2017-08-01 PROCEDURE — 36415 COLL VENOUS BLD VENIPUNCTURE: CPT

## 2017-08-01 RX ORDER — LISINOPRIL 5 MG/1
5 TABLET ORAL DAILY
Status: DISCONTINUED | OUTPATIENT
Start: 2017-08-01 | End: 2017-08-02

## 2017-08-01 RX ORDER — CLOPIDOGREL BISULFATE 75 MG/1
75 TABLET ORAL DAILY
Status: DISCONTINUED | OUTPATIENT
Start: 2017-08-01 | End: 2017-08-05 | Stop reason: HOSPADM

## 2017-08-01 RX ORDER — SIMVASTATIN 10 MG
10 TABLET ORAL NIGHTLY
Status: DISCONTINUED | OUTPATIENT
Start: 2017-08-01 | End: 2017-08-05 | Stop reason: HOSPADM

## 2017-08-01 RX ORDER — ZIPRASIDONE HYDROCHLORIDE 40 MG/1
40 CAPSULE ORAL DAILY
Status: DISCONTINUED | OUTPATIENT
Start: 2017-08-01 | End: 2017-08-05 | Stop reason: HOSPADM

## 2017-08-01 RX ORDER — PANTOPRAZOLE SODIUM 40 MG/1
40 TABLET, DELAYED RELEASE ORAL
Status: DISCONTINUED | OUTPATIENT
Start: 2017-08-02 | End: 2017-08-05 | Stop reason: HOSPADM

## 2017-08-01 RX ORDER — HYDROXYZINE HYDROCHLORIDE 25 MG/1
25 TABLET, FILM COATED ORAL NIGHTLY PRN
Status: DISCONTINUED | OUTPATIENT
Start: 2017-08-01 | End: 2017-08-03

## 2017-08-01 RX ORDER — POLYETHYLENE GLYCOL 3350 17 G/17G
17 POWDER, FOR SOLUTION ORAL DAILY
Status: DISCONTINUED | OUTPATIENT
Start: 2017-08-01 | End: 2017-08-05 | Stop reason: HOSPADM

## 2017-08-01 RX ORDER — FOLIC ACID 1 MG/1
1 TABLET ORAL DAILY
Status: DISCONTINUED | OUTPATIENT
Start: 2017-08-01 | End: 2017-08-05 | Stop reason: HOSPADM

## 2017-08-01 RX ORDER — FAMOTIDINE 20 MG/1
20 TABLET, FILM COATED ORAL EVERY 24 HOURS
Status: DISCONTINUED | OUTPATIENT
Start: 2017-08-01 | End: 2017-08-05 | Stop reason: HOSPADM

## 2017-08-01 RX ORDER — CITALOPRAM 20 MG/1
40 TABLET ORAL DAILY
Status: DISCONTINUED | OUTPATIENT
Start: 2017-08-01 | End: 2017-08-05 | Stop reason: HOSPADM

## 2017-08-01 RX ORDER — ONDANSETRON 4 MG/1
4 TABLET, FILM COATED ORAL EVERY 8 HOURS PRN
Status: DISCONTINUED | OUTPATIENT
Start: 2017-08-01 | End: 2017-08-05 | Stop reason: HOSPADM

## 2017-08-01 RX ORDER — HEPARIN SODIUM 5000 [USP'U]/ML
5000 INJECTION, SOLUTION INTRAVENOUS; SUBCUTANEOUS EVERY 8 HOURS SCHEDULED
Status: DISCONTINUED | OUTPATIENT
Start: 2017-08-01 | End: 2017-08-05 | Stop reason: HOSPADM

## 2017-08-01 RX ORDER — MEGESTROL ACETATE 40 MG/ML
400 SUSPENSION ORAL DAILY
Status: DISCONTINUED | OUTPATIENT
Start: 2017-08-01 | End: 2017-08-05 | Stop reason: HOSPADM

## 2017-08-01 RX ORDER — PHENYTOIN SODIUM 100 MG/1
100 CAPSULE, EXTENDED RELEASE ORAL 3 TIMES DAILY
Status: DISCONTINUED | OUTPATIENT
Start: 2017-08-01 | End: 2017-08-02

## 2017-08-01 RX ORDER — LORAZEPAM 2 MG/ML
2 INJECTION INTRAMUSCULAR EVERY 4 HOURS PRN
Status: DISCONTINUED | OUTPATIENT
Start: 2017-08-01 | End: 2017-08-05 | Stop reason: HOSPADM

## 2017-08-01 RX ORDER — RIVASTIGMINE 4.6 MG/24H
1 PATCH, EXTENDED RELEASE TRANSDERMAL DAILY
Status: DISCONTINUED | OUTPATIENT
Start: 2017-08-01 | End: 2017-08-05 | Stop reason: HOSPADM

## 2017-08-01 RX ORDER — METOPROLOL SUCCINATE 25 MG/1
25 TABLET, EXTENDED RELEASE ORAL DAILY
Status: DISCONTINUED | OUTPATIENT
Start: 2017-08-01 | End: 2017-08-05 | Stop reason: HOSPADM

## 2017-08-01 RX ADMIN — EXTENDED PHENYTOIN SODIUM 100 MG: 100 CAPSULE ORAL at 16:50

## 2017-08-01 RX ADMIN — LISINOPRIL 5 MG: 5 TABLET ORAL at 16:52

## 2017-08-01 RX ADMIN — MEGESTROL ACETATE 400 MG: 40 SUSPENSION ORAL at 16:52

## 2017-08-01 RX ADMIN — ZIPRASIDONE HYDROCHLORIDE 40 MG: 40 CAPSULE ORAL at 16:50

## 2017-08-01 RX ADMIN — MIRTAZAPINE 45 MG: 30 TABLET, FILM COATED ORAL at 16:50

## 2017-08-01 RX ADMIN — FOLIC ACID 1 MG: 1 TABLET ORAL at 16:51

## 2017-08-01 RX ADMIN — FAMOTIDINE 20 MG: 20 TABLET, FILM COATED ORAL at 21:04

## 2017-08-01 RX ADMIN — METOPROLOL SUCCINATE 25 MG: 25 TABLET, FILM COATED, EXTENDED RELEASE ORAL at 16:51

## 2017-08-01 RX ADMIN — POLYETHYLENE GLYCOL 3350 17 G: 17 POWDER, FOR SOLUTION ORAL at 16:51

## 2017-08-01 RX ADMIN — HEPARIN SODIUM 5000 UNITS: 5000 INJECTION, SOLUTION INTRAVENOUS; SUBCUTANEOUS at 21:04

## 2017-08-01 RX ADMIN — EXTENDED PHENYTOIN SODIUM 100 MG: 100 CAPSULE ORAL at 21:04

## 2017-08-01 RX ADMIN — SIMVASTATIN 10 MG: 10 TABLET, FILM COATED ORAL at 21:04

## 2017-08-01 RX ADMIN — CLOPIDOGREL 75 MG: 75 TABLET, FILM COATED ORAL at 16:51

## 2017-08-01 RX ADMIN — CITALOPRAM 40 MG: 20 TABLET, FILM COATED ORAL at 16:51

## 2017-08-01 ASSESSMENT — ENCOUNTER SYMPTOMS
BACK PAIN: 0
VOMITING: 0
NAUSEA: 0
SORE THROAT: 0
SHORTNESS OF BREATH: 0
COUGH: 0
WHEEZING: 0
CHEST TIGHTNESS: 0
ABDOMINAL PAIN: 0
TROUBLE SWALLOWING: 0

## 2017-08-02 LAB
ABSOLUTE EOS #: 0.2 K/UL (ref 0–0.4)
ABSOLUTE LYMPH #: 0.7 K/UL (ref 1–4.8)
ABSOLUTE MONO #: 0.4 K/UL (ref 0.1–1.2)
ALBUMIN SERPL-MCNC: 3.4 G/DL (ref 3.5–5.2)
ALBUMIN/GLOBULIN RATIO: 1.2 (ref 1–2.5)
ALP BLD-CCNC: 117 U/L (ref 40–129)
ALT SERPL-CCNC: 5 U/L (ref 5–41)
ANION GAP SERPL CALCULATED.3IONS-SCNC: 11 MMOL/L (ref 9–17)
AST SERPL-CCNC: 14 U/L
BASOPHILS # BLD: 1 %
BASOPHILS ABSOLUTE: 0 K/UL (ref 0–0.2)
BILIRUB SERPL-MCNC: 0.38 MG/DL (ref 0.3–1.2)
BUN BLDV-MCNC: 15 MG/DL (ref 8–23)
BUN/CREAT BLD: ABNORMAL (ref 9–20)
CALCIUM SERPL-MCNC: 9.7 MG/DL (ref 8.6–10.4)
CHLORIDE BLD-SCNC: 97 MMOL/L (ref 98–107)
CO2: 27 MMOL/L (ref 20–31)
CREAT SERPL-MCNC: 3.87 MG/DL (ref 0.7–1.2)
DIFFERENTIAL TYPE: ABNORMAL
EOSINOPHILS RELATIVE PERCENT: 6 %
GFR AFRICAN AMERICAN: 19 ML/MIN
GFR NON-AFRICAN AMERICAN: 16 ML/MIN
GFR SERPL CREATININE-BSD FRML MDRD: ABNORMAL ML/MIN/{1.73_M2}
GFR SERPL CREATININE-BSD FRML MDRD: ABNORMAL ML/MIN/{1.73_M2}
GLUCOSE BLD-MCNC: 73 MG/DL (ref 70–99)
HCT VFR BLD CALC: 30.5 % (ref 41–53)
HEMOGLOBIN: 10.2 G/DL (ref 13.5–17.5)
LYMPHOCYTES # BLD: 21 %
MCH RBC QN AUTO: 32.3 PG (ref 26–34)
MCHC RBC AUTO-ENTMCNC: 33.3 G/DL (ref 31–37)
MCV RBC AUTO: 97 FL (ref 80–100)
MONOCYTES # BLD: 13 %
PDW BLD-RTO: 17.3 % (ref 12.5–15.4)
PHENYTOIN DATE LAST DOSE: ABNORMAL
PHENYTOIN DOSE AMOUNT: ABNORMAL
PHENYTOIN DOSE TIME: ABNORMAL
PHENYTOIN FREE: 0.4 UG/ML (ref 1–2)
PHENYTOIN LEVEL: 2.9 UG/ML (ref 10–20)
PLATELET # BLD: 118 K/UL (ref 140–450)
PLATELET ESTIMATE: ABNORMAL
PMV BLD AUTO: 8.6 FL (ref 6–12)
POTASSIUM SERPL-SCNC: 5.3 MMOL/L (ref 3.7–5.3)
RBC # BLD: 3.14 M/UL (ref 4.5–5.9)
RBC # BLD: ABNORMAL 10*6/UL
SEG NEUTROPHILS: 59 %
SEGMENTED NEUTROPHILS ABSOLUTE COUNT: 2 K/UL (ref 1.8–7.7)
SODIUM BLD-SCNC: 135 MMOL/L (ref 135–144)
TOTAL PROTEIN: 6.2 G/DL (ref 6.4–8.3)
WBC # BLD: 3.3 K/UL (ref 3.5–11)
WBC # BLD: ABNORMAL 10*3/UL

## 2017-08-02 PROCEDURE — 6360000002 HC RX W HCPCS: Performed by: INTERNAL MEDICINE

## 2017-08-02 PROCEDURE — G8978 MOBILITY CURRENT STATUS: HCPCS

## 2017-08-02 PROCEDURE — G0378 HOSPITAL OBSERVATION PER HR: HCPCS

## 2017-08-02 PROCEDURE — 97162 PT EVAL MOD COMPLEX 30 MIN: CPT

## 2017-08-02 PROCEDURE — G8979 MOBILITY GOAL STATUS: HCPCS

## 2017-08-02 PROCEDURE — 80186 ASSAY OF PHENYTOIN FREE: CPT

## 2017-08-02 PROCEDURE — 94762 N-INVAS EAR/PLS OXIMTRY CONT: CPT

## 2017-08-02 PROCEDURE — 6370000000 HC RX 637 (ALT 250 FOR IP): Performed by: NURSE PRACTITIONER

## 2017-08-02 PROCEDURE — 6370000000 HC RX 637 (ALT 250 FOR IP): Performed by: INTERNAL MEDICINE

## 2017-08-02 PROCEDURE — 36415 COLL VENOUS BLD VENIPUNCTURE: CPT

## 2017-08-02 PROCEDURE — 97530 THERAPEUTIC ACTIVITIES: CPT

## 2017-08-02 PROCEDURE — 80185 ASSAY OF PHENYTOIN TOTAL: CPT

## 2017-08-02 PROCEDURE — 85025 COMPLETE CBC W/AUTO DIFF WBC: CPT

## 2017-08-02 PROCEDURE — 99223 1ST HOSP IP/OBS HIGH 75: CPT | Performed by: INTERNAL MEDICINE

## 2017-08-02 PROCEDURE — 80053 COMPREHEN METABOLIC PANEL: CPT

## 2017-08-02 PROCEDURE — 99232 SBSQ HOSP IP/OBS MODERATE 35: CPT | Performed by: NURSE PRACTITIONER

## 2017-08-02 RX ORDER — LISINOPRIL 5 MG/1
5 TABLET ORAL 2 TIMES DAILY
Status: DISCONTINUED | OUTPATIENT
Start: 2017-08-02 | End: 2017-08-03

## 2017-08-02 RX ORDER — 0.9 % SODIUM CHLORIDE 0.9 %
150 INTRAVENOUS SOLUTION INTRAVENOUS PRN
Status: DISCONTINUED | OUTPATIENT
Start: 2017-08-02 | End: 2017-08-05 | Stop reason: HOSPADM

## 2017-08-02 RX ORDER — 0.9 % SODIUM CHLORIDE 0.9 %
250 INTRAVENOUS SOLUTION INTRAVENOUS PRN
Status: DISCONTINUED | OUTPATIENT
Start: 2017-08-02 | End: 2017-08-05 | Stop reason: HOSPADM

## 2017-08-02 RX ORDER — LEVETIRACETAM 500 MG/1
500 TABLET ORAL 2 TIMES DAILY
Status: DISCONTINUED | OUTPATIENT
Start: 2017-08-02 | End: 2017-08-05 | Stop reason: HOSPADM

## 2017-08-02 RX ADMIN — FOLIC ACID 1 MG: 1 TABLET ORAL at 08:17

## 2017-08-02 RX ADMIN — SIMVASTATIN 10 MG: 10 TABLET, FILM COATED ORAL at 21:56

## 2017-08-02 RX ADMIN — LEVETIRACETAM 500 MG: 500 TABLET, FILM COATED ORAL at 21:56

## 2017-08-02 RX ADMIN — LISINOPRIL 5 MG: 5 TABLET ORAL at 17:23

## 2017-08-02 RX ADMIN — HEPARIN SODIUM 5000 UNITS: 5000 INJECTION, SOLUTION INTRAVENOUS; SUBCUTANEOUS at 14:02

## 2017-08-02 RX ADMIN — MEGESTROL ACETATE 400 MG: 40 SUSPENSION ORAL at 08:16

## 2017-08-02 RX ADMIN — CITALOPRAM 40 MG: 20 TABLET, FILM COATED ORAL at 08:17

## 2017-08-02 RX ADMIN — EXTENDED PHENYTOIN SODIUM 100 MG: 100 CAPSULE ORAL at 08:15

## 2017-08-02 RX ADMIN — CLOPIDOGREL 75 MG: 75 TABLET, FILM COATED ORAL at 08:17

## 2017-08-02 RX ADMIN — POLYETHYLENE GLYCOL 3350 17 G: 17 POWDER, FOR SOLUTION ORAL at 08:16

## 2017-08-02 RX ADMIN — FAMOTIDINE 20 MG: 20 TABLET, FILM COATED ORAL at 17:23

## 2017-08-02 RX ADMIN — EXTENDED PHENYTOIN SODIUM 100 MG: 100 CAPSULE ORAL at 14:02

## 2017-08-02 RX ADMIN — ZIPRASIDONE HYDROCHLORIDE 40 MG: 40 CAPSULE ORAL at 08:17

## 2017-08-02 RX ADMIN — METOPROLOL SUCCINATE 25 MG: 25 TABLET, FILM COATED, EXTENDED RELEASE ORAL at 08:17

## 2017-08-02 RX ADMIN — LISINOPRIL 5 MG: 5 TABLET ORAL at 08:16

## 2017-08-02 RX ADMIN — MIRTAZAPINE 45 MG: 30 TABLET, FILM COATED ORAL at 08:17

## 2017-08-02 RX ADMIN — HEPARIN SODIUM 5000 UNITS: 5000 INJECTION, SOLUTION INTRAVENOUS; SUBCUTANEOUS at 06:01

## 2017-08-02 RX ADMIN — PANTOPRAZOLE SODIUM 40 MG: 40 TABLET, DELAYED RELEASE ORAL at 06:01

## 2017-08-03 LAB
ANION GAP SERPL CALCULATED.3IONS-SCNC: 15 MMOL/L (ref 9–17)
BUN BLDV-MCNC: 25 MG/DL (ref 8–23)
BUN/CREAT BLD: ABNORMAL (ref 9–20)
CALCIUM SERPL-MCNC: 9.5 MG/DL (ref 8.6–10.4)
CHLORIDE BLD-SCNC: 97 MMOL/L (ref 98–107)
CO2: 25 MMOL/L (ref 20–31)
CREAT SERPL-MCNC: 5.11 MG/DL (ref 0.7–1.2)
GFR AFRICAN AMERICAN: 14 ML/MIN
GFR NON-AFRICAN AMERICAN: 11 ML/MIN
GFR SERPL CREATININE-BSD FRML MDRD: ABNORMAL ML/MIN/{1.73_M2}
GFR SERPL CREATININE-BSD FRML MDRD: ABNORMAL ML/MIN/{1.73_M2}
GLUCOSE BLD-MCNC: 92 MG/DL (ref 70–99)
HCT VFR BLD CALC: 27.8 % (ref 41–53)
HEMOGLOBIN: 9.2 G/DL (ref 13.5–17.5)
MCH RBC QN AUTO: 31.2 PG (ref 26–34)
MCHC RBC AUTO-ENTMCNC: 33.1 G/DL (ref 31–37)
MCV RBC AUTO: 94.4 FL (ref 80–100)
PDW BLD-RTO: 16.2 % (ref 12.5–15.4)
PLATELET # BLD: 131 K/UL (ref 140–450)
PMV BLD AUTO: 7.8 FL (ref 6–12)
POTASSIUM SERPL-SCNC: 4.7 MMOL/L (ref 3.7–5.3)
RBC # BLD: 2.94 M/UL (ref 4.5–5.9)
SODIUM BLD-SCNC: 137 MMOL/L (ref 135–144)
WBC # BLD: 2.6 K/UL (ref 3.5–11)

## 2017-08-03 PROCEDURE — 85027 COMPLETE CBC AUTOMATED: CPT

## 2017-08-03 PROCEDURE — 5A1D00Z PERFORMANCE OF URINARY FILTRATION, SINGLE: ICD-10-PCS | Performed by: INTERNAL MEDICINE

## 2017-08-03 PROCEDURE — 90937 HEMODIALYSIS REPEATED EVAL: CPT

## 2017-08-03 PROCEDURE — 94762 N-INVAS EAR/PLS OXIMTRY CONT: CPT

## 2017-08-03 PROCEDURE — 6370000000 HC RX 637 (ALT 250 FOR IP): Performed by: INTERNAL MEDICINE

## 2017-08-03 PROCEDURE — 99233 SBSQ HOSP IP/OBS HIGH 50: CPT | Performed by: INTERNAL MEDICINE

## 2017-08-03 PROCEDURE — 1200000000 HC SEMI PRIVATE

## 2017-08-03 PROCEDURE — 6360000002 HC RX W HCPCS: Performed by: NURSE PRACTITIONER

## 2017-08-03 PROCEDURE — 6360000002 HC RX W HCPCS: Performed by: INTERNAL MEDICINE

## 2017-08-03 PROCEDURE — 80048 BASIC METABOLIC PNL TOTAL CA: CPT

## 2017-08-03 PROCEDURE — 99232 SBSQ HOSP IP/OBS MODERATE 35: CPT | Performed by: NURSE PRACTITIONER

## 2017-08-03 PROCEDURE — 6370000000 HC RX 637 (ALT 250 FOR IP): Performed by: NURSE PRACTITIONER

## 2017-08-03 RX ORDER — HYDROXYZINE HYDROCHLORIDE 25 MG/1
25 TABLET, FILM COATED ORAL EVERY 6 HOURS PRN
Status: DISCONTINUED | OUTPATIENT
Start: 2017-08-03 | End: 2017-08-03

## 2017-08-03 RX ORDER — DIPHENHYDRAMINE HYDROCHLORIDE 50 MG/ML
25 INJECTION INTRAMUSCULAR; INTRAVENOUS EVERY 6 HOURS PRN
Status: DISCONTINUED | OUTPATIENT
Start: 2017-08-03 | End: 2017-08-05 | Stop reason: HOSPADM

## 2017-08-03 RX ORDER — LISINOPRIL 10 MG/1
10 TABLET ORAL 2 TIMES DAILY
Status: DISCONTINUED | OUTPATIENT
Start: 2017-08-03 | End: 2017-08-05 | Stop reason: HOSPADM

## 2017-08-03 RX ADMIN — CLOPIDOGREL 75 MG: 75 TABLET, FILM COATED ORAL at 07:47

## 2017-08-03 RX ADMIN — LISINOPRIL 5 MG: 5 TABLET ORAL at 07:47

## 2017-08-03 RX ADMIN — LEVETIRACETAM 500 MG: 500 TABLET, FILM COATED ORAL at 07:47

## 2017-08-03 RX ADMIN — MEGESTROL ACETATE 400 MG: 40 SUSPENSION ORAL at 07:47

## 2017-08-03 RX ADMIN — CITALOPRAM 40 MG: 20 TABLET, FILM COATED ORAL at 07:47

## 2017-08-03 RX ADMIN — DOXERCALCIFEROL 16 MCG: 2 INJECTION, SOLUTION INTRAVENOUS at 11:05

## 2017-08-03 RX ADMIN — HEPARIN SODIUM 5000 UNITS: 5000 INJECTION, SOLUTION INTRAVENOUS; SUBCUTANEOUS at 14:47

## 2017-08-03 RX ADMIN — SIMVASTATIN 10 MG: 10 TABLET, FILM COATED ORAL at 20:28

## 2017-08-03 RX ADMIN — FOLIC ACID 1 MG: 1 TABLET ORAL at 07:47

## 2017-08-03 RX ADMIN — FAMOTIDINE 20 MG: 20 TABLET, FILM COATED ORAL at 17:58

## 2017-08-03 RX ADMIN — DIPHENHYDRAMINE HYDROCHLORIDE 25 MG: 50 INJECTION, SOLUTION INTRAMUSCULAR; INTRAVENOUS at 05:35

## 2017-08-03 RX ADMIN — ZIPRASIDONE HYDROCHLORIDE 40 MG: 40 CAPSULE ORAL at 07:47

## 2017-08-03 RX ADMIN — PANTOPRAZOLE SODIUM 40 MG: 40 TABLET, DELAYED RELEASE ORAL at 07:47

## 2017-08-03 RX ADMIN — LISINOPRIL 10 MG: 10 TABLET ORAL at 17:59

## 2017-08-03 RX ADMIN — LEVETIRACETAM 500 MG: 500 TABLET, FILM COATED ORAL at 20:28

## 2017-08-03 RX ADMIN — METOPROLOL SUCCINATE 25 MG: 25 TABLET, FILM COATED, EXTENDED RELEASE ORAL at 07:47

## 2017-08-03 RX ADMIN — MIRTAZAPINE 45 MG: 30 TABLET, FILM COATED ORAL at 07:47

## 2017-08-03 ASSESSMENT — PAIN SCALES - GENERAL
PAINLEVEL_OUTOF10: 0
PAINLEVEL_OUTOF10: 0

## 2017-08-04 VITALS
TEMPERATURE: 98.2 F | SYSTOLIC BLOOD PRESSURE: 145 MMHG | HEART RATE: 78 BPM | RESPIRATION RATE: 16 BRPM | OXYGEN SATURATION: 98 % | DIASTOLIC BLOOD PRESSURE: 78 MMHG | HEIGHT: 69 IN | WEIGHT: 173.28 LBS | BODY MASS INDEX: 25.67 KG/M2

## 2017-08-04 PROCEDURE — 97110 THERAPEUTIC EXERCISES: CPT

## 2017-08-04 PROCEDURE — 6370000000 HC RX 637 (ALT 250 FOR IP): Performed by: INTERNAL MEDICINE

## 2017-08-04 PROCEDURE — 94762 N-INVAS EAR/PLS OXIMTRY CONT: CPT

## 2017-08-04 PROCEDURE — 6360000002 HC RX W HCPCS: Performed by: NURSE PRACTITIONER

## 2017-08-04 PROCEDURE — 6370000000 HC RX 637 (ALT 250 FOR IP): Performed by: NURSE PRACTITIONER

## 2017-08-04 PROCEDURE — 97535 SELF CARE MNGMENT TRAINING: CPT

## 2017-08-04 PROCEDURE — 97530 THERAPEUTIC ACTIVITIES: CPT

## 2017-08-04 PROCEDURE — 99239 HOSP IP/OBS DSCHRG MGMT >30: CPT | Performed by: INTERNAL MEDICINE

## 2017-08-04 PROCEDURE — 99231 SBSQ HOSP IP/OBS SF/LOW 25: CPT | Performed by: NURSE PRACTITIONER

## 2017-08-04 PROCEDURE — 97166 OT EVAL MOD COMPLEX 45 MIN: CPT

## 2017-08-04 PROCEDURE — G8987 SELF CARE CURRENT STATUS: HCPCS

## 2017-08-04 PROCEDURE — 97116 GAIT TRAINING THERAPY: CPT

## 2017-08-04 PROCEDURE — 6360000002 HC RX W HCPCS: Performed by: INTERNAL MEDICINE

## 2017-08-04 PROCEDURE — G8988 SELF CARE GOAL STATUS: HCPCS

## 2017-08-04 RX ORDER — LEVETIRACETAM 500 MG/1
500 TABLET ORAL 2 TIMES DAILY
Qty: 60 TABLET | Refills: 0 | Status: ON HOLD | OUTPATIENT
Start: 2017-08-04 | End: 2018-02-14 | Stop reason: HOSPADM

## 2017-08-04 RX ORDER — LISINOPRIL 10 MG/1
10 TABLET ORAL 2 TIMES DAILY
Qty: 60 TABLET | Refills: 0 | Status: ON HOLD | OUTPATIENT
Start: 2017-08-04 | End: 2017-09-06 | Stop reason: HOSPADM

## 2017-08-04 RX ADMIN — FOLIC ACID 1 MG: 1 TABLET ORAL at 10:24

## 2017-08-04 RX ADMIN — DIPHENHYDRAMINE HYDROCHLORIDE 25 MG: 50 INJECTION, SOLUTION INTRAMUSCULAR; INTRAVENOUS at 12:52

## 2017-08-04 RX ADMIN — LEVETIRACETAM 500 MG: 500 TABLET, FILM COATED ORAL at 10:24

## 2017-08-04 RX ADMIN — FAMOTIDINE 20 MG: 20 TABLET, FILM COATED ORAL at 17:31

## 2017-08-04 RX ADMIN — POLYETHYLENE GLYCOL 3350 17 G: 17 POWDER, FOR SOLUTION ORAL at 10:24

## 2017-08-04 RX ADMIN — PANTOPRAZOLE SODIUM 40 MG: 40 TABLET, DELAYED RELEASE ORAL at 06:07

## 2017-08-04 RX ADMIN — LEVETIRACETAM 500 MG: 500 TABLET, FILM COATED ORAL at 20:39

## 2017-08-04 RX ADMIN — METOPROLOL SUCCINATE 25 MG: 25 TABLET, FILM COATED, EXTENDED RELEASE ORAL at 10:24

## 2017-08-04 RX ADMIN — DIPHENHYDRAMINE HYDROCHLORIDE 25 MG: 50 INJECTION, SOLUTION INTRAMUSCULAR; INTRAVENOUS at 06:07

## 2017-08-04 RX ADMIN — MEGESTROL ACETATE 400 MG: 40 SUSPENSION ORAL at 10:23

## 2017-08-04 RX ADMIN — ZIPRASIDONE HYDROCHLORIDE 40 MG: 40 CAPSULE ORAL at 10:24

## 2017-08-04 RX ADMIN — HEPARIN SODIUM 5000 UNITS: 5000 INJECTION, SOLUTION INTRAVENOUS; SUBCUTANEOUS at 20:39

## 2017-08-04 RX ADMIN — MIRTAZAPINE 45 MG: 30 TABLET, FILM COATED ORAL at 10:24

## 2017-08-04 RX ADMIN — SIMVASTATIN 10 MG: 10 TABLET, FILM COATED ORAL at 20:39

## 2017-08-04 RX ADMIN — HEPARIN SODIUM 5000 UNITS: 5000 INJECTION, SOLUTION INTRAVENOUS; SUBCUTANEOUS at 13:37

## 2017-08-04 RX ADMIN — LISINOPRIL 10 MG: 10 TABLET ORAL at 17:31

## 2017-08-04 RX ADMIN — CLOPIDOGREL 75 MG: 75 TABLET, FILM COATED ORAL at 10:25

## 2017-08-04 RX ADMIN — CITALOPRAM 40 MG: 20 TABLET, FILM COATED ORAL at 10:25

## 2017-08-04 RX ADMIN — LISINOPRIL 10 MG: 10 TABLET ORAL at 10:24

## 2017-08-04 ASSESSMENT — PAIN SCALES - GENERAL: PAINLEVEL_OUTOF10: 0

## 2017-08-08 ENCOUNTER — CARE COORDINATION (OUTPATIENT)
Dept: CASE MANAGEMENT | Age: 67
End: 2017-08-08

## 2017-08-08 LAB
EKG ATRIAL RATE: 80 BPM
EKG P AXIS: 30 DEGREES
EKG P-R INTERVAL: 196 MS
EKG Q-T INTERVAL: 402 MS
EKG QRS DURATION: 92 MS
EKG QTC CALCULATION (BAZETT): 463 MS
EKG R AXIS: -44 DEGREES
EKG T AXIS: 23 DEGREES
EKG VENTRICULAR RATE: 80 BPM

## 2017-08-11 ENCOUNTER — CARE COORDINATION (OUTPATIENT)
Dept: CARE COORDINATION | Age: 67
End: 2017-08-11

## 2017-08-25 ENCOUNTER — HOSPITAL ENCOUNTER (OUTPATIENT)
Age: 67
Setting detail: SPECIMEN
Discharge: HOME | End: 2017-08-25
Payer: MEDICARE

## 2017-08-25 LAB — PHENYTOIN FREE: 0.2 UG/ML (ref 1–2)

## 2017-08-25 PROCEDURE — 80186 ASSAY OF PHENYTOIN FREE: CPT

## 2017-08-25 PROCEDURE — 36415 COLL VENOUS BLD VENIPUNCTURE: CPT

## 2017-08-25 PROCEDURE — P9603 ONE-WAY ALLOW PRORATED MILES: HCPCS

## 2017-09-02 ENCOUNTER — HOSPITAL ENCOUNTER (OUTPATIENT)
Age: 67
Setting detail: OBSERVATION
Discharge: OTHER INSTITUTION | End: 2017-09-06
Attending: EMERGENCY MEDICINE | Admitting: INTERNAL MEDICINE
Payer: MEDICARE

## 2017-09-02 ENCOUNTER — APPOINTMENT (OUTPATIENT)
Dept: GENERAL RADIOLOGY | Age: 67
End: 2017-09-02
Payer: MEDICARE

## 2017-09-02 ENCOUNTER — APPOINTMENT (OUTPATIENT)
Dept: CT IMAGING | Age: 67
End: 2017-09-02
Payer: MEDICARE

## 2017-09-02 DIAGNOSIS — F32.A DEPRESSION, UNSPECIFIED DEPRESSION TYPE: Primary | ICD-10-CM

## 2017-09-02 DIAGNOSIS — R45.851 SUICIDAL IDEATION: ICD-10-CM

## 2017-09-02 DIAGNOSIS — F05 ACUTE CONFUSIONAL STATE: ICD-10-CM

## 2017-09-02 LAB
ABSOLUTE EOS #: 0.2 K/UL (ref 0–0.4)
ABSOLUTE LYMPH #: 0.5 K/UL (ref 1–4.8)
ABSOLUTE MONO #: 0.4 K/UL (ref 0.1–1.3)
ALBUMIN SERPL-MCNC: 4.3 G/DL (ref 3.5–5.2)
ALBUMIN/GLOBULIN RATIO: NORMAL (ref 1–2.5)
ALP BLD-CCNC: 109 U/L (ref 40–129)
ALT SERPL-CCNC: 9 U/L (ref 5–41)
AMMONIA: 29 UMOL/L (ref 16–60)
ANION GAP SERPL CALCULATED.3IONS-SCNC: 16 MMOL/L (ref 9–17)
AST SERPL-CCNC: 17 U/L
BASOPHILS # BLD: 1 %
BASOPHILS ABSOLUTE: 0 K/UL (ref 0–0.2)
BILIRUB SERPL-MCNC: 0.66 MG/DL (ref 0.3–1.2)
BILIRUBIN DIRECT: 0.26 MG/DL
BILIRUBIN, INDIRECT: 0.4 MG/DL (ref 0–1)
BUN BLDV-MCNC: 11 MG/DL (ref 8–23)
BUN/CREAT BLD: ABNORMAL (ref 9–20)
CALCIUM SERPL-MCNC: 10.3 MG/DL (ref 8.6–10.4)
CHLORIDE BLD-SCNC: 90 MMOL/L (ref 98–107)
CO2: 28 MMOL/L (ref 20–31)
CREAT SERPL-MCNC: 3.08 MG/DL (ref 0.7–1.2)
DIFFERENTIAL TYPE: ABNORMAL
EOSINOPHILS RELATIVE PERCENT: 5 %
GFR AFRICAN AMERICAN: 25 ML/MIN
GFR NON-AFRICAN AMERICAN: 20 ML/MIN
GFR SERPL CREATININE-BSD FRML MDRD: ABNORMAL ML/MIN/{1.73_M2}
GFR SERPL CREATININE-BSD FRML MDRD: ABNORMAL ML/MIN/{1.73_M2}
GLOBULIN: NORMAL G/DL (ref 1.5–3.8)
GLUCOSE BLD-MCNC: 99 MG/DL (ref 70–99)
HCT VFR BLD CALC: 34.3 % (ref 41–53)
HEMOGLOBIN: 11.5 G/DL (ref 13.5–17.5)
LYMPHOCYTES # BLD: 15 %
MAGNESIUM: 2.1 MG/DL (ref 1.6–2.6)
MCH RBC QN AUTO: 31.8 PG (ref 26–34)
MCHC RBC AUTO-ENTMCNC: 33.6 G/DL (ref 31–37)
MCV RBC AUTO: 94.6 FL (ref 80–100)
MONOCYTES # BLD: 11 %
PDW BLD-RTO: 14.1 % (ref 11.5–14.9)
PHOSPHORUS: 3.5 MG/DL (ref 2.5–4.5)
PLATELET # BLD: 160 K/UL (ref 150–450)
PLATELET ESTIMATE: ABNORMAL
PMV BLD AUTO: 8.7 FL (ref 6–12)
POTASSIUM SERPL-SCNC: 3.8 MMOL/L (ref 3.7–5.3)
RBC # BLD: 3.63 M/UL (ref 4.5–5.9)
RBC # BLD: ABNORMAL 10*6/UL
SEG NEUTROPHILS: 68 %
SEGMENTED NEUTROPHILS ABSOLUTE COUNT: 2.4 K/UL (ref 1.3–9.1)
SODIUM BLD-SCNC: 134 MMOL/L (ref 135–144)
TOTAL PROTEIN: 8.1 G/DL (ref 6.4–8.3)
WBC # BLD: 3.5 K/UL (ref 3.5–11)
WBC # BLD: ABNORMAL 10*3/UL

## 2017-09-02 PROCEDURE — 85025 COMPLETE CBC W/AUTO DIFF WBC: CPT

## 2017-09-02 PROCEDURE — 71010 XR CHEST PORTABLE: CPT

## 2017-09-02 PROCEDURE — 83735 ASSAY OF MAGNESIUM: CPT

## 2017-09-02 PROCEDURE — 80076 HEPATIC FUNCTION PANEL: CPT

## 2017-09-02 PROCEDURE — 82140 ASSAY OF AMMONIA: CPT

## 2017-09-02 PROCEDURE — 80186 ASSAY OF PHENYTOIN FREE: CPT

## 2017-09-02 PROCEDURE — 36415 COLL VENOUS BLD VENIPUNCTURE: CPT

## 2017-09-02 PROCEDURE — 70450 CT HEAD/BRAIN W/O DYE: CPT

## 2017-09-02 PROCEDURE — 84100 ASSAY OF PHOSPHORUS: CPT

## 2017-09-02 PROCEDURE — 99285 EMERGENCY DEPT VISIT HI MDM: CPT

## 2017-09-02 PROCEDURE — 80048 BASIC METABOLIC PNL TOTAL CA: CPT

## 2017-09-02 ASSESSMENT — ENCOUNTER SYMPTOMS
SORE THROAT: 0
SHORTNESS OF BREATH: 0
NAUSEA: 0
EYE DISCHARGE: 0
EYE REDNESS: 0
COLOR CHANGE: 0
RHINORRHEA: 0
COUGH: 0
DIARRHEA: 0
VOMITING: 0

## 2017-09-02 ASSESSMENT — PAIN DESCRIPTION - DESCRIPTORS: DESCRIPTORS: ACHING

## 2017-09-02 ASSESSMENT — PAIN SCALES - GENERAL: PAINLEVEL_OUTOF10: 10

## 2017-09-02 ASSESSMENT — PAIN DESCRIPTION - FREQUENCY: FREQUENCY: CONTINUOUS

## 2017-09-03 LAB — PHENYTOIN FREE: 0.4 UG/ML (ref 1–2)

## 2017-09-03 PROCEDURE — 96372 THER/PROPH/DIAG INJ SC/IM: CPT

## 2017-09-03 PROCEDURE — 6370000000 HC RX 637 (ALT 250 FOR IP): Performed by: INTERNAL MEDICINE

## 2017-09-03 PROCEDURE — 2580000003 HC RX 258: Performed by: INTERNAL MEDICINE

## 2017-09-03 PROCEDURE — 6360000002 HC RX W HCPCS: Performed by: INTERNAL MEDICINE

## 2017-09-03 PROCEDURE — 99223 1ST HOSP IP/OBS HIGH 75: CPT | Performed by: INTERNAL MEDICINE

## 2017-09-03 PROCEDURE — G0378 HOSPITAL OBSERVATION PER HR: HCPCS

## 2017-09-03 RX ORDER — PHENYTOIN SODIUM 100 MG/1
100 CAPSULE, EXTENDED RELEASE ORAL 3 TIMES DAILY
Status: DISCONTINUED | OUTPATIENT
Start: 2017-09-03 | End: 2017-09-06 | Stop reason: HOSPADM

## 2017-09-03 RX ORDER — SODIUM CHLORIDE 0.9 % (FLUSH) 0.9 %
10 SYRINGE (ML) INJECTION PRN
Status: DISCONTINUED | OUTPATIENT
Start: 2017-09-03 | End: 2017-09-06 | Stop reason: HOSPADM

## 2017-09-03 RX ORDER — RIVASTIGMINE 4.6 MG/24H
1 PATCH, EXTENDED RELEASE TRANSDERMAL DAILY
Status: DISCONTINUED | OUTPATIENT
Start: 2017-09-03 | End: 2017-09-06 | Stop reason: HOSPADM

## 2017-09-03 RX ORDER — FOLIC ACID 1 MG/1
1 TABLET ORAL DAILY
Status: DISCONTINUED | OUTPATIENT
Start: 2017-09-03 | End: 2017-09-06 | Stop reason: HOSPADM

## 2017-09-03 RX ORDER — ONDANSETRON 4 MG/1
4 TABLET, ORALLY DISINTEGRATING ORAL EVERY 8 HOURS PRN
Status: DISCONTINUED | OUTPATIENT
Start: 2017-09-03 | End: 2017-09-06 | Stop reason: HOSPADM

## 2017-09-03 RX ORDER — SODIUM CHLORIDE 0.9 % (FLUSH) 0.9 %
10 SYRINGE (ML) INJECTION EVERY 12 HOURS SCHEDULED
Status: DISCONTINUED | OUTPATIENT
Start: 2017-09-03 | End: 2017-09-06 | Stop reason: HOSPADM

## 2017-09-03 RX ORDER — HEPARIN SODIUM 5000 [USP'U]/ML
5000 INJECTION, SOLUTION INTRAVENOUS; SUBCUTANEOUS EVERY 8 HOURS SCHEDULED
Status: DISCONTINUED | OUTPATIENT
Start: 2017-09-03 | End: 2017-09-06 | Stop reason: HOSPADM

## 2017-09-03 RX ORDER — FAMOTIDINE 20 MG/1
20 TABLET, FILM COATED ORAL 2 TIMES DAILY
Status: DISCONTINUED | OUTPATIENT
Start: 2017-09-03 | End: 2017-09-04

## 2017-09-03 RX ORDER — METOPROLOL SUCCINATE 25 MG/1
25 TABLET, EXTENDED RELEASE ORAL DAILY
Status: DISCONTINUED | OUTPATIENT
Start: 2017-09-03 | End: 2017-09-06 | Stop reason: HOSPADM

## 2017-09-03 RX ORDER — LEVETIRACETAM 500 MG/1
500 TABLET ORAL 2 TIMES DAILY
Status: DISCONTINUED | OUTPATIENT
Start: 2017-09-03 | End: 2017-09-06 | Stop reason: HOSPADM

## 2017-09-03 RX ORDER — ACETAMINOPHEN 325 MG/1
650 TABLET ORAL EVERY 4 HOURS PRN
Status: DISCONTINUED | OUTPATIENT
Start: 2017-09-03 | End: 2017-09-06 | Stop reason: HOSPADM

## 2017-09-03 RX ORDER — CLOPIDOGREL BISULFATE 75 MG/1
75 TABLET ORAL DAILY
Status: DISCONTINUED | OUTPATIENT
Start: 2017-09-03 | End: 2017-09-06 | Stop reason: HOSPADM

## 2017-09-03 RX ORDER — CITALOPRAM 40 MG/1
40 TABLET ORAL DAILY
Status: DISCONTINUED | OUTPATIENT
Start: 2017-09-03 | End: 2017-09-06 | Stop reason: HOSPADM

## 2017-09-03 RX ADMIN — LEVETIRACETAM 500 MG: 500 TABLET ORAL at 09:59

## 2017-09-03 RX ADMIN — FOLIC ACID 1 MG: 1 TABLET ORAL at 09:59

## 2017-09-03 RX ADMIN — FAMOTIDINE 20 MG: 20 TABLET ORAL at 21:26

## 2017-09-03 RX ADMIN — HEPARIN SODIUM 5000 UNITS: 5000 INJECTION, SOLUTION INTRAVENOUS; SUBCUTANEOUS at 15:06

## 2017-09-03 RX ADMIN — LEVETIRACETAM 500 MG: 500 TABLET ORAL at 21:26

## 2017-09-03 RX ADMIN — CLOPIDOGREL BISULFATE 75 MG: 75 TABLET ORAL at 09:59

## 2017-09-03 RX ADMIN — PHENYTOIN SODIUM 100 MG: 100 CAPSULE ORAL at 09:59

## 2017-09-03 RX ADMIN — PHENYTOIN SODIUM 100 MG: 100 CAPSULE ORAL at 15:06

## 2017-09-03 RX ADMIN — CITALOPRAM HYDROBROMIDE 40 MG: 40 TABLET ORAL at 09:59

## 2017-09-03 RX ADMIN — Medication 10 ML: at 10:01

## 2017-09-03 RX ADMIN — PHENYTOIN SODIUM 100 MG: 100 CAPSULE ORAL at 21:26

## 2017-09-03 RX ADMIN — METOPROLOL SUCCINATE 25 MG: 25 TABLET, EXTENDED RELEASE ORAL at 10:00

## 2017-09-03 RX ADMIN — Medication 10 ML: at 21:27

## 2017-09-03 RX ADMIN — LEVETIRACETAM 500 MG: 500 TABLET ORAL at 02:09

## 2017-09-03 RX ADMIN — FAMOTIDINE 20 MG: 20 TABLET ORAL at 09:59

## 2017-09-03 RX ADMIN — FAMOTIDINE 20 MG: 20 TABLET ORAL at 02:09

## 2017-09-03 RX ADMIN — HEPARIN SODIUM 5000 UNITS: 5000 INJECTION, SOLUTION INTRAVENOUS; SUBCUTANEOUS at 21:27

## 2017-09-03 RX ADMIN — HEPARIN SODIUM 5000 UNITS: 5000 INJECTION, SOLUTION INTRAVENOUS; SUBCUTANEOUS at 06:32

## 2017-09-03 ASSESSMENT — PAIN SCALES - GENERAL
PAINLEVEL_OUTOF10: 0

## 2017-09-04 LAB — GLUCOSE BLD-MCNC: 105 MG/DL (ref 75–110)

## 2017-09-04 PROCEDURE — 6360000002 HC RX W HCPCS: Performed by: INTERNAL MEDICINE

## 2017-09-04 PROCEDURE — G0378 HOSPITAL OBSERVATION PER HR: HCPCS

## 2017-09-04 PROCEDURE — 99232 SBSQ HOSP IP/OBS MODERATE 35: CPT | Performed by: INTERNAL MEDICINE

## 2017-09-04 PROCEDURE — 2580000003 HC RX 258: Performed by: INTERNAL MEDICINE

## 2017-09-04 PROCEDURE — 82947 ASSAY GLUCOSE BLOOD QUANT: CPT

## 2017-09-04 PROCEDURE — 97161 PT EVAL LOW COMPLEX 20 MIN: CPT

## 2017-09-04 PROCEDURE — 96372 THER/PROPH/DIAG INJ SC/IM: CPT

## 2017-09-04 PROCEDURE — 6370000000 HC RX 637 (ALT 250 FOR IP): Performed by: INTERNAL MEDICINE

## 2017-09-04 RX ORDER — FAMOTIDINE 20 MG/1
20 TABLET, FILM COATED ORAL DAILY
Status: DISCONTINUED | OUTPATIENT
Start: 2017-09-05 | End: 2017-09-06

## 2017-09-04 RX ADMIN — PHENYTOIN SODIUM 100 MG: 100 CAPSULE ORAL at 10:20

## 2017-09-04 RX ADMIN — METOPROLOL SUCCINATE 25 MG: 25 TABLET, EXTENDED RELEASE ORAL at 10:20

## 2017-09-04 RX ADMIN — CITALOPRAM HYDROBROMIDE 40 MG: 40 TABLET ORAL at 10:19

## 2017-09-04 RX ADMIN — FAMOTIDINE 20 MG: 20 TABLET ORAL at 10:20

## 2017-09-04 RX ADMIN — HEPARIN SODIUM 5000 UNITS: 5000 INJECTION, SOLUTION INTRAVENOUS; SUBCUTANEOUS at 15:16

## 2017-09-04 RX ADMIN — FOLIC ACID 1 MG: 1 TABLET ORAL at 10:20

## 2017-09-04 RX ADMIN — PHENYTOIN SODIUM 100 MG: 100 CAPSULE ORAL at 20:28

## 2017-09-04 RX ADMIN — Medication 10 ML: at 10:25

## 2017-09-04 RX ADMIN — LEVETIRACETAM 500 MG: 500 TABLET ORAL at 20:28

## 2017-09-04 RX ADMIN — LEVETIRACETAM 500 MG: 500 TABLET ORAL at 10:20

## 2017-09-04 RX ADMIN — HEPARIN SODIUM 5000 UNITS: 5000 INJECTION, SOLUTION INTRAVENOUS; SUBCUTANEOUS at 20:28

## 2017-09-04 RX ADMIN — HEPARIN SODIUM 5000 UNITS: 5000 INJECTION, SOLUTION INTRAVENOUS; SUBCUTANEOUS at 05:45

## 2017-09-04 RX ADMIN — FAMOTIDINE 20 MG: 20 TABLET ORAL at 20:28

## 2017-09-04 RX ADMIN — PHENYTOIN SODIUM 100 MG: 100 CAPSULE ORAL at 15:16

## 2017-09-04 RX ADMIN — CLOPIDOGREL BISULFATE 75 MG: 75 TABLET ORAL at 10:20

## 2017-09-04 RX ADMIN — Medication 10 ML: at 20:33

## 2017-09-04 ASSESSMENT — PAIN SCALES - GENERAL
PAINLEVEL_OUTOF10: 0

## 2017-09-05 PROBLEM — N18.6 ESRD (END STAGE RENAL DISEASE) ON DIALYSIS (HCC): Status: ACTIVE | Noted: 2017-09-05

## 2017-09-05 PROBLEM — F01.50 VASCULAR DEMENTIA WITHOUT BEHAVIORAL DISTURBANCE (HCC): Status: ACTIVE | Noted: 2017-09-05

## 2017-09-05 PROBLEM — Z99.2 ESRD (END STAGE RENAL DISEASE) ON DIALYSIS (HCC): Status: ACTIVE | Noted: 2017-09-05

## 2017-09-05 PROBLEM — Z91.199 NONCOMPLIANCE: Status: ACTIVE | Noted: 2017-09-05

## 2017-09-05 PROBLEM — F05 DELIRIUM DUE TO ANOTHER MEDICAL CONDITION: Status: ACTIVE | Noted: 2017-09-05

## 2017-09-05 LAB
ALBUMIN SERPL-MCNC: 3.1 G/DL (ref 3.5–5.2)
ANION GAP SERPL CALCULATED.3IONS-SCNC: 16 MMOL/L (ref 9–17)
BUN BLDV-MCNC: 24 MG/DL (ref 8–23)
BUN/CREAT BLD: ABNORMAL (ref 9–20)
CALCIUM SERPL-MCNC: 9.2 MG/DL (ref 8.6–10.4)
CHLORIDE BLD-SCNC: 93 MMOL/L (ref 98–107)
CO2: 23 MMOL/L (ref 20–31)
CREAT SERPL-MCNC: 5.62 MG/DL (ref 0.7–1.2)
GFR AFRICAN AMERICAN: 12 ML/MIN
GFR NON-AFRICAN AMERICAN: 10 ML/MIN
GFR SERPL CREATININE-BSD FRML MDRD: ABNORMAL ML/MIN/{1.73_M2}
GFR SERPL CREATININE-BSD FRML MDRD: ABNORMAL ML/MIN/{1.73_M2}
GLUCOSE BLD-MCNC: 87 MG/DL (ref 70–99)
PHENYTOIN DATE LAST DOSE: ABNORMAL
PHENYTOIN DOSE AMOUNT: ABNORMAL
PHENYTOIN DOSE TIME: 2028
PHENYTOIN LEVEL: 2.7 UG/ML (ref 10–20)
PHOSPHORUS: 4.8 MG/DL (ref 2.5–4.5)
POTASSIUM SERPL-SCNC: 3.8 MMOL/L (ref 3.7–5.3)
SODIUM BLD-SCNC: 132 MMOL/L (ref 135–144)

## 2017-09-05 PROCEDURE — 2580000003 HC RX 258: Performed by: INTERNAL MEDICINE

## 2017-09-05 PROCEDURE — 6370000000 HC RX 637 (ALT 250 FOR IP): Performed by: INTERNAL MEDICINE

## 2017-09-05 PROCEDURE — G0257 UNSCHED DIALYSIS ESRD PT HOS: HCPCS

## 2017-09-05 PROCEDURE — G0378 HOSPITAL OBSERVATION PER HR: HCPCS

## 2017-09-05 PROCEDURE — 80185 ASSAY OF PHENYTOIN TOTAL: CPT

## 2017-09-05 PROCEDURE — 8010000000 HC HEMODIALYSIS ACUTE INPT

## 2017-09-05 PROCEDURE — 99233 SBSQ HOSP IP/OBS HIGH 50: CPT | Performed by: INTERNAL MEDICINE

## 2017-09-05 PROCEDURE — 80069 RENAL FUNCTION PANEL: CPT

## 2017-09-05 PROCEDURE — 36415 COLL VENOUS BLD VENIPUNCTURE: CPT

## 2017-09-05 PROCEDURE — 82947 ASSAY GLUCOSE BLOOD QUANT: CPT

## 2017-09-05 RX ORDER — AMLODIPINE BESYLATE 2.5 MG/1
2.5 TABLET ORAL DAILY
Status: DISCONTINUED | OUTPATIENT
Start: 2017-09-05 | End: 2017-09-06

## 2017-09-05 RX ADMIN — LEVETIRACETAM 500 MG: 500 TABLET ORAL at 12:42

## 2017-09-05 RX ADMIN — FAMOTIDINE 20 MG: 20 TABLET ORAL at 12:41

## 2017-09-05 RX ADMIN — CLOPIDOGREL BISULFATE 75 MG: 75 TABLET ORAL at 12:39

## 2017-09-05 RX ADMIN — AMLODIPINE BESYLATE 2.5 MG: 2.5 TABLET ORAL at 23:03

## 2017-09-05 RX ADMIN — FOLIC ACID 1 MG: 1 TABLET ORAL at 12:41

## 2017-09-05 RX ADMIN — PHENYTOIN SODIUM 100 MG: 100 CAPSULE ORAL at 18:18

## 2017-09-05 RX ADMIN — LEVETIRACETAM 500 MG: 500 TABLET ORAL at 20:27

## 2017-09-05 RX ADMIN — PHENYTOIN SODIUM 100 MG: 100 CAPSULE ORAL at 12:42

## 2017-09-05 RX ADMIN — CITALOPRAM HYDROBROMIDE 40 MG: 40 TABLET ORAL at 12:41

## 2017-09-05 RX ADMIN — METOPROLOL SUCCINATE 25 MG: 25 TABLET, EXTENDED RELEASE ORAL at 12:47

## 2017-09-05 RX ADMIN — Medication 10 ML: at 12:44

## 2017-09-05 RX ADMIN — PHENYTOIN SODIUM 100 MG: 100 CAPSULE ORAL at 20:27

## 2017-09-05 RX ADMIN — Medication 10 ML: at 20:27

## 2017-09-05 ASSESSMENT — PAIN SCALES - GENERAL
PAINLEVEL_OUTOF10: 0

## 2017-09-06 VITALS
HEIGHT: 66 IN | WEIGHT: 169.31 LBS | TEMPERATURE: 98 F | SYSTOLIC BLOOD PRESSURE: 164 MMHG | OXYGEN SATURATION: 97 % | HEART RATE: 78 BPM | BODY MASS INDEX: 27.21 KG/M2 | RESPIRATION RATE: 16 BRPM | DIASTOLIC BLOOD PRESSURE: 56 MMHG

## 2017-09-06 LAB
GLUCOSE BLD-MCNC: 81 MG/DL (ref 75–110)
GLUCOSE BLD-MCNC: 90 MG/DL (ref 75–110)

## 2017-09-06 PROCEDURE — G0378 HOSPITAL OBSERVATION PER HR: HCPCS

## 2017-09-06 PROCEDURE — 99232 SBSQ HOSP IP/OBS MODERATE 35: CPT | Performed by: INTERNAL MEDICINE

## 2017-09-06 PROCEDURE — G8987 SELF CARE CURRENT STATUS: HCPCS

## 2017-09-06 PROCEDURE — 82947 ASSAY GLUCOSE BLOOD QUANT: CPT

## 2017-09-06 PROCEDURE — 6370000000 HC RX 637 (ALT 250 FOR IP): Performed by: INTERNAL MEDICINE

## 2017-09-06 PROCEDURE — G8988 SELF CARE GOAL STATUS: HCPCS

## 2017-09-06 PROCEDURE — 2580000003 HC RX 258: Performed by: INTERNAL MEDICINE

## 2017-09-06 PROCEDURE — 96372 THER/PROPH/DIAG INJ SC/IM: CPT

## 2017-09-06 PROCEDURE — 97165 OT EVAL LOW COMPLEX 30 MIN: CPT

## 2017-09-06 PROCEDURE — 6360000002 HC RX W HCPCS: Performed by: INTERNAL MEDICINE

## 2017-09-06 RX ORDER — AMLODIPINE BESYLATE 5 MG/1
5 TABLET ORAL DAILY
Qty: 30 TABLET | Refills: 3 | Status: SHIPPED | OUTPATIENT
Start: 2017-09-07 | End: 2018-08-10 | Stop reason: SDUPTHER

## 2017-09-06 RX ORDER — ACETAMINOPHEN 325 MG/1
650 TABLET ORAL EVERY 6 HOURS PRN
Qty: 120 TABLET | Refills: 3 | Status: SHIPPED | OUTPATIENT
Start: 2017-09-06 | End: 2019-02-25 | Stop reason: SDUPTHER

## 2017-09-06 RX ORDER — MIRTAZAPINE 45 MG/1
45 TABLET, FILM COATED ORAL NIGHTLY
Qty: 30 TABLET | Refills: 3 | Status: SHIPPED | OUTPATIENT
Start: 2017-09-06 | End: 2018-08-06 | Stop reason: SDUPTHER

## 2017-09-06 RX ORDER — FAMOTIDINE 20 MG/1
20 TABLET, FILM COATED ORAL
Status: DISCONTINUED | OUTPATIENT
Start: 2017-09-08 | End: 2017-09-06 | Stop reason: HOSPADM

## 2017-09-06 RX ORDER — ZIPRASIDONE HYDROCHLORIDE 40 MG/1
40 CAPSULE ORAL 2 TIMES DAILY WITH MEALS
Status: DISCONTINUED | OUTPATIENT
Start: 2017-09-06 | End: 2017-09-06 | Stop reason: HOSPADM

## 2017-09-06 RX ORDER — ZIPRASIDONE HYDROCHLORIDE 40 MG/1
40 CAPSULE ORAL 2 TIMES DAILY WITH MEALS
Qty: 60 CAPSULE | Refills: 3 | Status: SHIPPED | OUTPATIENT
Start: 2017-09-06 | End: 2019-02-25 | Stop reason: SDUPTHER

## 2017-09-06 RX ORDER — MIRTAZAPINE 15 MG/1
45 TABLET, FILM COATED ORAL NIGHTLY
Status: DISCONTINUED | OUTPATIENT
Start: 2017-09-06 | End: 2017-09-06 | Stop reason: HOSPADM

## 2017-09-06 RX ORDER — AMLODIPINE BESYLATE 5 MG/1
5 TABLET ORAL DAILY
Status: DISCONTINUED | OUTPATIENT
Start: 2017-09-07 | End: 2017-09-06 | Stop reason: HOSPADM

## 2017-09-06 RX ORDER — PHENYTOIN SODIUM 100 MG/1
100 CAPSULE, EXTENDED RELEASE ORAL 3 TIMES DAILY
Qty: 60 CAPSULE | Refills: 3 | Status: ON HOLD | OUTPATIENT
Start: 2017-09-06 | End: 2018-02-11 | Stop reason: HOSPADM

## 2017-09-06 RX ADMIN — LEVETIRACETAM 500 MG: 500 TABLET ORAL at 09:50

## 2017-09-06 RX ADMIN — CLOPIDOGREL BISULFATE 75 MG: 75 TABLET ORAL at 09:50

## 2017-09-06 RX ADMIN — Medication 10 ML: at 09:56

## 2017-09-06 RX ADMIN — PHENYTOIN SODIUM 100 MG: 100 CAPSULE ORAL at 13:54

## 2017-09-06 RX ADMIN — CITALOPRAM HYDROBROMIDE 40 MG: 40 TABLET ORAL at 09:50

## 2017-09-06 RX ADMIN — PHENYTOIN SODIUM 100 MG: 100 CAPSULE ORAL at 09:50

## 2017-09-06 RX ADMIN — HEPARIN SODIUM 5000 UNITS: 5000 INJECTION, SOLUTION INTRAVENOUS; SUBCUTANEOUS at 13:54

## 2017-09-06 RX ADMIN — METOPROLOL SUCCINATE 25 MG: 25 TABLET, EXTENDED RELEASE ORAL at 09:50

## 2017-09-06 RX ADMIN — AMLODIPINE BESYLATE 2.5 MG: 2.5 TABLET ORAL at 09:50

## 2017-09-06 RX ADMIN — FOLIC ACID 1 MG: 1 TABLET ORAL at 09:50

## 2017-09-08 ENCOUNTER — CARE COORDINATION (OUTPATIENT)
Dept: CASE MANAGEMENT | Age: 67
End: 2017-09-08

## 2017-09-08 LAB — GLUCOSE BLD-MCNC: 111 MG/DL (ref 75–110)

## 2017-09-11 ENCOUNTER — HOSPITAL ENCOUNTER (OUTPATIENT)
Age: 67
Setting detail: SPECIMEN
Discharge: HOME | End: 2017-09-11
Payer: MEDICARE

## 2017-09-12 ENCOUNTER — HOSPITAL ENCOUNTER (OUTPATIENT)
Age: 67
Setting detail: SPECIMEN
Discharge: HOME | End: 2017-09-12
Payer: MEDICARE

## 2017-09-21 ENCOUNTER — CARE COORDINATION (OUTPATIENT)
Dept: CASE MANAGEMENT | Age: 67
End: 2017-09-21

## 2017-09-26 ENCOUNTER — HOSPITAL ENCOUNTER (OUTPATIENT)
Age: 67
Setting detail: SPECIMEN
Discharge: HOME | End: 2017-09-26
Payer: MEDICARE

## 2017-09-26 LAB
DATE, STOOL #1: NORMAL
DATE, STOOL #2: NORMAL
DATE, STOOL #3: NORMAL
HEMOCCULT SP1 STL QL: NEGATIVE
HEMOCCULT SP2 STL QL: NORMAL
HEMOCCULT SP3 STL QL: NORMAL
TIME, STOOL #1: NORMAL
TIME, STOOL #2: NORMAL
TIME, STOOL #3: NORMAL

## 2017-09-26 PROCEDURE — G0328 FECAL BLOOD SCRN IMMUNOASSAY: HCPCS

## 2017-09-28 ENCOUNTER — CARE COORDINATION (OUTPATIENT)
Dept: CASE MANAGEMENT | Age: 67
End: 2017-09-28

## 2017-10-01 ENCOUNTER — HOSPITAL ENCOUNTER (OUTPATIENT)
Age: 67
Setting detail: SPECIMEN
Discharge: HOME | End: 2017-10-01
Payer: MEDICARE

## 2017-10-01 LAB
DATE, STOOL #1: 10
DATE, STOOL #2: NORMAL
DATE, STOOL #3: NORMAL
HEMOCCULT SP1 STL QL: NEGATIVE
HEMOCCULT SP2 STL QL: NORMAL
HEMOCCULT SP3 STL QL: NORMAL
TIME, STOOL #1: 320
TIME, STOOL #2: NORMAL
TIME, STOOL #3: NORMAL

## 2017-10-01 PROCEDURE — 82272 OCCULT BLD FECES 1-3 TESTS: CPT

## 2017-10-12 ENCOUNTER — CARE COORDINATION (OUTPATIENT)
Dept: CASE MANAGEMENT | Age: 67
End: 2017-10-12

## 2017-10-30 ENCOUNTER — CARE COORDINATION (OUTPATIENT)
Dept: CASE MANAGEMENT | Age: 67
End: 2017-10-30

## 2017-10-30 NOTE — CARE COORDINATION
[facility] Transitions Initial Follow Up Call    Call within 2 business days of discharge: Yes    Patient: Karyn Tam Patient : 1950   MRN: [id number]  Reason for Admission: There are no discharge diagnoses documented for the most recent discharge. Discharge Date: 17 RARS: [facility] Risk Score: 32     Spoke with: Coby Harden, regulo    Facility: Discharged from [address] [place] [address] 10/28/17    Non-face-to-face services provided:  Obtained and reviewed discharge summary and/or continuity of care documents    Inpatient Assessment  Care Transitions 24 Hour Call    Schedule Follow Up Appointment with PCP:  Declined  Do you have any ongoing symptoms?:  No  Do you have a copy of your discharge instructions?:  Yes  Do you have all of your prescriptions and are they filled?:  Yes  Have you been contacted by a [facility] Pharmacist?:  No  Have you scheduled your follow up appointment?:  No  Were you discharged with any Home Care or Post Acute Services:  Yes  Post Acute Services:  Home Health (Comment: [id number])  Patient DME:  Al Cat you feel like you have everything you need to keep you well at home?:  Yes  Care Transitions Interventions     Medication discrepancy reported by the daughter. States that she plans on going over all the medications with the Home Health nurse coming in today from [address] [place] [address]. Has Passport Aides from [address] as well. Follow up appointment scheduled. Dialysis continues.       Follow Up  Future Appointments  Date Time Provider Vidhya Odom   2017 2:30 PM MD Dony Diaz RN

## 2017-11-01 ENCOUNTER — CARE COORDINATION (OUTPATIENT)
Dept: CASE MANAGEMENT | Age: 67
End: 2017-11-01

## 2017-11-06 ENCOUNTER — TELEPHONE (OUTPATIENT)
Dept: FAMILY MEDICINE CLINIC | Age: 67
End: 2017-11-06

## 2017-11-09 ENCOUNTER — CARE COORDINATION (OUTPATIENT)
Dept: CASE MANAGEMENT | Age: 67
End: 2017-11-09

## 2017-11-09 NOTE — CARE COORDINATION
[facility] Transitions Follow Up Call    2017    Patient: Franky Crow  Patient : 1950   MRN: [id number]  Reason for Admission: There are no discharge diagnoses documented for the most recent discharge. Discharge Date: 17 RARS: Risk Score: 31  Discharged from [facility] 10/28/17   Spoke with: Parke Claude, daughter    Non-face-to-face services provided:  Obtained and reviewed discharge summary and/or continuity of care documents    Inpatient Assessment  Care Transitions Subsequent and Final Call    Subsequent and Final Calls  Do you have any ongoing symptoms?:  Yes  Onset of Patient-reported symptoms:  Other  Patient-reported symptoms:  Weakness  Interventions for patient-reported symptoms:  Other  Have your medications changed?:  Yes  Patient Reports:  Reports that the PCP nurse called and said to stop the Plavix and Dilantin and will discuss further when patient comes to appointment. Do you have any questions related to your medications?:  No  Do you currently have any active services?:  Yes  Are you currently active with any services?:  Home Health  Do you have any needs or concerns that I can assist you with?:  No  Care Transitions Interventions  Other Interventions:          States fall, no injury. Blood sugars are, \"fine\". Home Health continues to see patient. Care Transition completed. CMRS 10:  CKD, Hospital admissions x 3, ED x 1    Will send case to [facility]  for continued care needs.   Follow Up  Future Appointments  Date Time Provider Vidhya Odom   2017 10:00 AM MD Kirby Fuller RN

## 2017-11-13 ENCOUNTER — TELEPHONE (OUTPATIENT)
Dept: FAMILY MEDICINE CLINIC | Age: 67
End: 2017-11-13

## 2017-11-13 NOTE — TELEPHONE ENCOUNTER
Jules from [place] [address] called and said she was concerned with Mondragon Comes having loose stools and a sore on his mouth

## 2017-11-15 ENCOUNTER — OFFICE VISIT (OUTPATIENT)
Dept: FAMILY MEDICINE CLINIC | Age: 67
End: 2017-11-15
Payer: MEDICARE

## 2017-11-15 VITALS
TEMPERATURE: 97.5 F | DIASTOLIC BLOOD PRESSURE: 60 MMHG | SYSTOLIC BLOOD PRESSURE: 100 MMHG | WEIGHT: 169.31 LBS | BODY MASS INDEX: 27.21 KG/M2 | HEIGHT: 66 IN | OXYGEN SATURATION: 99 % | HEART RATE: 60 BPM

## 2017-11-15 DIAGNOSIS — R53.1 LEFT-SIDED WEAKNESS: ICD-10-CM

## 2017-11-15 DIAGNOSIS — I10 ESSENTIAL HYPERTENSION: Primary | ICD-10-CM

## 2017-11-15 DIAGNOSIS — Z99.3 WHEELCHAIR DEPENDENT: ICD-10-CM

## 2017-11-15 DIAGNOSIS — L29.9 PRURITUS: ICD-10-CM

## 2017-11-15 DIAGNOSIS — Z99.2 TYPE 2 DIABETES MELLITUS WITH CHRONIC KIDNEY DISEASE ON CHRONIC DIALYSIS, WITHOUT LONG-TERM CURRENT USE OF INSULIN (HCC): ICD-10-CM

## 2017-11-15 DIAGNOSIS — C64.1 RENAL CELL CARCINOMA OF RIGHT KIDNEY (HCC): ICD-10-CM

## 2017-11-15 DIAGNOSIS — G71.11 PROXIMAL MYOTONIC MYOPATHY (HCC): ICD-10-CM

## 2017-11-15 DIAGNOSIS — N18.6 ESRD ON HEMODIALYSIS (HCC): ICD-10-CM

## 2017-11-15 DIAGNOSIS — F25.1 SCHIZOAFFECTIVE DISORDER, DEPRESSIVE TYPE (HCC): ICD-10-CM

## 2017-11-15 DIAGNOSIS — I63.9 CEREBRAL INFARCTION, UNSPECIFIED MECHANISM (HCC): ICD-10-CM

## 2017-11-15 DIAGNOSIS — N18.6 TYPE 2 DIABETES MELLITUS WITH CHRONIC KIDNEY DISEASE ON CHRONIC DIALYSIS, WITHOUT LONG-TERM CURRENT USE OF INSULIN (HCC): ICD-10-CM

## 2017-11-15 DIAGNOSIS — R53.1 GENERALIZED WEAKNESS: ICD-10-CM

## 2017-11-15 DIAGNOSIS — Z09 HOSPITAL DISCHARGE FOLLOW-UP: ICD-10-CM

## 2017-11-15 DIAGNOSIS — R26.81 UNSTABLE GAIT: ICD-10-CM

## 2017-11-15 DIAGNOSIS — Z99.2 ESRD ON HEMODIALYSIS (HCC): ICD-10-CM

## 2017-11-15 DIAGNOSIS — E78.2 MIXED HYPERLIPIDEMIA: ICD-10-CM

## 2017-11-15 DIAGNOSIS — E11.22 TYPE 2 DIABETES MELLITUS WITH CHRONIC KIDNEY DISEASE ON CHRONIC DIALYSIS, WITHOUT LONG-TERM CURRENT USE OF INSULIN (HCC): ICD-10-CM

## 2017-11-15 DIAGNOSIS — L85.3 DRY SKIN DERMATITIS: ICD-10-CM

## 2017-11-15 PROCEDURE — 1036F TOBACCO NON-USER: CPT | Performed by: FAMILY MEDICINE

## 2017-11-15 PROCEDURE — G8598 ASA/ANTIPLAT THER USED: HCPCS | Performed by: FAMILY MEDICINE

## 2017-11-15 PROCEDURE — 1123F ACP DISCUSS/DSCN MKR DOCD: CPT | Performed by: FAMILY MEDICINE

## 2017-11-15 PROCEDURE — G8484 FLU IMMUNIZE NO ADMIN: HCPCS | Performed by: FAMILY MEDICINE

## 2017-11-15 PROCEDURE — 3044F HG A1C LEVEL LT 7.0%: CPT | Performed by: FAMILY MEDICINE

## 2017-11-15 PROCEDURE — 99215 OFFICE O/P EST HI 40 MIN: CPT | Performed by: FAMILY MEDICINE

## 2017-11-15 PROCEDURE — 3017F COLORECTAL CA SCREEN DOC REV: CPT | Performed by: FAMILY MEDICINE

## 2017-11-15 PROCEDURE — G8417 CALC BMI ABV UP PARAM F/U: HCPCS | Performed by: FAMILY MEDICINE

## 2017-11-15 PROCEDURE — G8427 DOCREV CUR MEDS BY ELIG CLIN: HCPCS | Performed by: FAMILY MEDICINE

## 2017-11-15 PROCEDURE — 4040F PNEUMOC VAC/ADMIN/RCVD: CPT | Performed by: FAMILY MEDICINE

## 2017-11-15 ASSESSMENT — ENCOUNTER SYMPTOMS
ABDOMINAL PAIN: 0
CHEST TIGHTNESS: 0
SORE THROAT: 0
COUGH: 0
CONSTIPATION: 0
DIARRHEA: 1
SHORTNESS OF BREATH: 0
RHINORRHEA: 0
BACK PAIN: 1
TROUBLE SWALLOWING: 0
NAUSEA: 0

## 2017-11-15 ASSESSMENT — PATIENT HEALTH QUESTIONNAIRE - PHQ9
2. FEELING DOWN, DEPRESSED OR HOPELESS: 0
SUM OF ALL RESPONSES TO PHQ QUESTIONS 1-9: 0
SUM OF ALL RESPONSES TO PHQ9 QUESTIONS 1 & 2: 0
1. LITTLE INTEREST OR PLEASURE IN DOING THINGS: 0

## 2017-11-15 NOTE — PROGRESS NOTES
[address]  [address] [place] [address]  Dept: [phone]  Dept Fax: [phone]    Leticia Gillespie is a 74 y.o. male who presents today for his medical conditions/complaints as noted below. Leticia Gillespie is c/o of Diarrhea (home care concerned has been going on past week)      HPI:     Patient is here for a face-to-face encounter regarding his home healthcare. His daughter is present. States that patient was admitted 3 months ago to the hospital for increasing confusion. He had difficulty in ambulating. He was treated and then transferred to a rehab place for 3 months. Patient did not do well with ambulation. Patient is now home for about 2 weeks now. He is in a wheelchair. He can hardly walk 5-6 steps. Patient states is not confused at present. Has no headaches or dizziness. No chest pain. He has been itching a lot. His skin has been dry. Patient states she has lost weight. Blood pressure is stable. Nonsmoker. Denies alcohol. Denies drugs. Denies caffeinated drinks. O2 sat is 99%. Patient states he has some pain in his mouth and along his gums. Patient also sees his psychiatrist.  He sees his nephrologist.  He goes for hemodialysis.             Social History   Substance Use Topics    Smoking status: Never Smoker    Smokeless tobacco: Never Used    Alcohol use No      Current Outpatient Prescriptions   Medication Sig Dispense Refill    metoprolol succinate (TOPROL XL) 25 MG extended release tablet TAKE 1\2 TABLET IN THE EVENING 30 tablet 0    acetaminophen (TYLENOL) 325 MG tablet Take 2 tablets by mouth every 6 hours as needed for Pain or Fever 120 tablet 3    phenytoin (DILANTIN) 100 MG ER capsule Take 1 capsule by mouth 3 times daily 60 capsule 3    mirtazapine (REMERON) 45 MG tablet Take 1 tablet by mouth nightly 30 tablet 3    ziprasidone (GEODON) 40 MG capsule Take 1 capsule by mouth 2 times daily (with meals) 60 capsule 3 and headaches. Psychiatric/Behavioral: Positive for confusion, decreased concentration, dysphoric mood and sleep disturbance. The patient is nervous/anxious. Objective:     Physical Exam   Constitutional: He is oriented to person, place, and time. He appears well-developed and well-nourished. HENT:   Head: Normocephalic. Right Ear: External ear normal.   Left Ear: External ear normal.   Nose: Mucosal edema present. Mouth/Throat: No oropharyngeal exudate. Nose  is congested and wet. Narrow air passages. Erythema plus. Sinuses are not tender. Patient has dentures which she removes. Gums show minimal erythema. No swelling is noted. Tongue is dry. Eyes: Pupils are equal, round, and reactive to light. No scleral icterus. Neck: Normal range of motion. Neck supple. No JVD present. No thyromegaly present. Neck shows no bruits. Cardiovascular: Normal rate, regular rhythm and normal heart sounds. Exam reveals no gallop. No murmur heard. Pulmonary/Chest: Effort normal.   Breath sounds are diminished. Scattered wheezes. No crackles. Abdominal: Soft. Bowel sounds are normal. He exhibits no mass. There is no tenderness. Liver and spleen are not palpable. Musculoskeletal:        Lumbar back: Normal.   Legs show no edema. Both knees are tender on flexion and extension. Mild crepitations noted. There is weakness of both his legs, left more than right. Strength is 2/5 for his proximal muscles. He is able to raise his arms above his head. No tremors noted. Gait was not tested. Patient is in a wheelchair. Lymphadenopathy:     He has no cervical adenopathy. Neurological: He is alert and oriented to person, place, and time. He has normal reflexes. Coordination and gait normal.   Skin: Skin is warm and dry. Skin is dry along his forearms. Excoriation plus. Skin is also dry over his abdominal wall and his legs. Psychiatric:   Affect is flat. Patient is quiet.   Thought content is difficult to assess. Nursing note and vitals reviewed. /60 (Site: Left Arm, Position: Sitting, Cuff Size: Medium Adult)   Pulse 60   Temp 97.5 °F (36.4 °C) (Oral)   Ht 5' 5.98\" (1.676 m)   Wt 169 lb 5 oz (76.8 kg)   SpO2 99%   BMI 27.34 kg/m²     Assessment:      1. Essential hypertension     2. Generalized weakness     3. Proximal myotonic myopathy (HCC)     4. Unstable gait     5. Dry skin dermatitis     6. Cerebral infarction, unspecified mechanism ([facility]     7. Schizoaffective disorder, depressive type ([facility]     8. Left-sided weakness     9. Mixed hyperlipidemia     10. ESRD on hemodialysis ([facility]     11. Type 2 diabetes mellitus with chronic kidney disease on chronic dialysis, without long-term current use of insulin ([facility]     12. Pruritus     13. Renal cell carcinoma of right kidney (HCC)     14. Wheelchair dependent     15. Hospital discharge follow-up         Plan:      Return in about 2 months (around 1/15/2018). No orders of the defined types were placed in this encounter. No orders of the defined types were placed in this encounter. Findings and/or pathophysiology discussed with patient. Plan of treatment discussed. Patient's medical problems were discussed with him and his daughter. Findings were explained. Chart was evaluated. Available lab work and tests results were discussed. Specialists notes were discussed. Available hospital notes were discussed. Patient appears to be total care. He has a visiting nurse coming twice a week. He has an aide 7 days a week. He is awaiting physical therapy and occupational therapy. Patient advised to check his blood sugars. He'll also follow-up with his psychiatrist.  Skin care was discussed. He will use petroleum jelly. He'll avoid hot water and detergents and bleach. His ADLs were noted. He will need help with dressing. He is unable to cook. He needs help with toileting. His able to feed himself.   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 His diet was discussed. Health maintenance was discussed. Weight and activity were discussed. Diet was discussed. Note for handicap sticker was given. 1.  Rah received counseling on the following healthy behaviors: nutrition and exercise  2. Patient given educational materials - see patient instructions  3. Was a self-tracking handout given in paper form or via [employer]? No  If yes, see orders or list here. 4.  Discussed use, benefit, and side effects of prescribed medications. Barriers to medication compliance addressed. All patient questions answered. Pt voiced understanding. 5.  Reviewed prior labs and health maintenance  6. Continue current medications, diet and exercise.     Completed Refills   Requested Prescriptions      No prescriptions requested or ordered in this encounter     Electronically signed by Blake Villela MD on 11/15/2017 at 6:12 PM

## 2017-11-15 NOTE — PATIENT INSTRUCTIONS
Patient Education        Diarrhea: Care Instructions  Your Care Instructions    Diarrhea is loose, watery stools (bowel movements). The exact cause is often hard to find. Sometimes diarrhea is your body's way of getting rid of what caused an upset stomach. Viruses, food poisoning, and many medicines can cause diarrhea. Some people get diarrhea in response to emotional stress, anxiety, or certain foods. Almost everyone has diarrhea now and then. It usually isn't serious, and your stools will return to normal soon. The important thing to do is replace the fluids you have lost, so you can prevent dehydration. The doctor has checked you carefully, but problems can develop later. If you notice any problems or new symptoms, get medical treatment right away. Follow-up care is a key part of your treatment and safety. Be sure to make and go to all appointments, and call your doctor if you are having problems. It's also a good idea to know your test results and keep a list of the medicines you take. How can you care for yourself at home? · Watch for signs of dehydration, which means your body has lost too much water. Dehydration is a serious condition and should be treated right away. Signs of dehydration are:  ¨ Increasing thirst and dry eyes and mouth. ¨ Feeling faint or lightheaded. ¨ Darker urine, and a smaller amount of urine than normal.  · To prevent dehydration, drink plenty of fluids, enough so that your urine is light yellow or clear like water. Choose water and other caffeine-free clear liquids until you feel better. If you have kidney, heart, or liver disease and have to limit fluids, talk with your doctor before you increase the amount of fluids you drink. · Begin eating small amounts of mild foods the next day, if you feel like it. ¨ Try yogurt that has live cultures of Lactobacillus.  (Check the label.)  ¨ Avoid spicy foods, fruits, alcohol, and caffeine until 48 hours after all symptoms are gone.  ¨ Avoid chewing gum that contains sorbitol. ¨ Avoid dairy products (except for yogurt with Lactobacillus) while you have diarrhea and for 3 days after symptoms are gone. · The doctor may recommend that you take over-the-counter medicine, such as loperamide (Imodium), if you still have diarrhea after 6 hours. Read and follow all instructions on the label. Do not use this medicine if you have bloody diarrhea, a high fever, or other signs of serious illness. Call your doctor if you think you are having a problem with your medicine. When should you call for help? Call 911 anytime you think you may need emergency care. For example, call if:  · You passed out (lost consciousness). · Your stools are maroon or very bloody. Call your doctor now or seek immediate medical care if:  · You are dizzy or lightheaded, or you feel like you may faint. · Your stools are black and look like tar, or they have streaks of blood. · You have new or worse belly pain. · You have symptoms of dehydration, such as:  ¨ Dry eyes and a dry mouth. ¨ Passing only a little dark urine. ¨ Feeling thirstier than usual.  · You have a new or higher fever. Watch closely for changes in your health, and be sure to contact your doctor if:  · Your diarrhea is getting worse. · You see pus in the diarrhea. · You are not getting better after 2 days (48 hours). Where can you learn more? Go to https://[employer]. org and sign in to your [employer] account. Enter W374 in the Taggstr box to learn more about \"Diarrhea: Care Instructions. \"     If you do not have an account, please click on the \"Sign Up Now\" link. Current as of: March 20, 2017  Content Version: 11.3  © 5267-2281 [employer]. Care instructions adapted under license by [facility] ([facility]).  If you have questions about a medical condition or this instruction, always ask your healthcare professional. Alleen Fleischer disclaims any warranty or liability for your use of this information.

## 2017-11-17 ENCOUNTER — TELEPHONE (OUTPATIENT)
Dept: FAMILY MEDICINE CLINIC | Age: 67
End: 2017-11-17

## 2017-11-17 NOTE — TELEPHONE ENCOUNTER
Patient fell last night hurt his knee falling walking to his bed from his comode.      Patient is still having the diarrhea they want something done regarding that

## 2017-12-15 DIAGNOSIS — R11.0 NAUSEA: ICD-10-CM

## 2017-12-15 RX ORDER — OMEPRAZOLE 20 MG/1
CAPSULE, DELAYED RELEASE ORAL
Qty: 30 CAPSULE | Refills: 0 | Status: SHIPPED | OUTPATIENT
Start: 2017-12-15 | End: 2018-01-17 | Stop reason: SDUPTHER

## 2017-12-20 RX ORDER — RIVASTIGMINE 4.6 MG/24H
PATCH, EXTENDED RELEASE TRANSDERMAL
Qty: 30 PATCH | Refills: 0 | Status: ON HOLD | OUTPATIENT
Start: 2017-12-20 | End: 2018-02-11 | Stop reason: HOSPADM

## 2018-01-17 DIAGNOSIS — R11.0 NAUSEA: ICD-10-CM

## 2018-01-17 RX ORDER — OMEPRAZOLE 20 MG/1
CAPSULE, DELAYED RELEASE ORAL
Qty: 30 CAPSULE | Refills: 0 | Status: SHIPPED | OUTPATIENT
Start: 2018-01-17 | End: 2018-02-09 | Stop reason: SDUPTHER

## 2018-02-09 ENCOUNTER — APPOINTMENT (OUTPATIENT)
Dept: CT IMAGING | Age: 68
DRG: 073 | End: 2018-02-09
Payer: MEDICARE

## 2018-02-09 ENCOUNTER — HOSPITAL ENCOUNTER (INPATIENT)
Age: 68
LOS: 2 days | Discharge: HOME HEALTH | DRG: 073 | End: 2018-02-11
Attending: EMERGENCY MEDICINE | Admitting: FAMILY MEDICINE
Payer: MEDICARE

## 2018-02-09 ENCOUNTER — APPOINTMENT (OUTPATIENT)
Dept: GENERAL RADIOLOGY | Age: 68
DRG: 073 | End: 2018-02-09
Payer: MEDICARE

## 2018-02-09 DIAGNOSIS — R59.0 CERVICAL LYMPHADENOPATHY: ICD-10-CM

## 2018-02-09 DIAGNOSIS — I21.4 NSTEMI (NON-ST ELEVATED MYOCARDIAL INFARCTION) (HCC): Primary | ICD-10-CM

## 2018-02-09 DIAGNOSIS — R11.0 NAUSEA: ICD-10-CM

## 2018-02-09 DIAGNOSIS — R29.6 FREQUENT FALLS: ICD-10-CM

## 2018-02-09 LAB
ABSOLUTE EOS #: 0.29 K/UL (ref 0–0.4)
ABSOLUTE IMMATURE GRANULOCYTE: 0 K/UL (ref 0–0.3)
ABSOLUTE LYMPH #: 0.61 K/UL (ref 1–4.8)
ABSOLUTE MONO #: 0.5 K/UL (ref 0.1–0.8)
ALBUMIN SERPL-MCNC: 4 G/DL (ref 3.5–5.2)
ALBUMIN/GLOBULIN RATIO: 1.4 (ref 1–2.5)
ALP BLD-CCNC: 83 U/L (ref 40–129)
ALT SERPL-CCNC: 10 U/L (ref 5–41)
ANION GAP SERPL CALCULATED.3IONS-SCNC: 14 MMOL/L (ref 9–17)
AST SERPL-CCNC: 24 U/L
BASOPHILS # BLD: 0 % (ref 0–2)
BASOPHILS ABSOLUTE: 0 K/UL (ref 0–0.2)
BILIRUB SERPL-MCNC: 0.46 MG/DL (ref 0.3–1.2)
BUN BLDV-MCNC: 26 MG/DL (ref 8–23)
BUN/CREAT BLD: ABNORMAL (ref 9–20)
CALCIUM SERPL-MCNC: 10.8 MG/DL (ref 8.6–10.4)
CHLORIDE BLD-SCNC: 94 MMOL/L (ref 98–107)
CO2: 30 MMOL/L (ref 20–31)
CREAT SERPL-MCNC: 4.5 MG/DL (ref 0.7–1.2)
DIFFERENTIAL TYPE: ABNORMAL
DIRECT EXAM: NORMAL
EKG ATRIAL RATE: 78 BPM
EKG P AXIS: 26 DEGREES
EKG P-R INTERVAL: 180 MS
EKG Q-T INTERVAL: 382 MS
EKG QRS DURATION: 92 MS
EKG QTC CALCULATION (BAZETT): 435 MS
EKG R AXIS: -52 DEGREES
EKG T AXIS: 76 DEGREES
EKG VENTRICULAR RATE: 78 BPM
EOSINOPHILS RELATIVE PERCENT: 8 % (ref 1–4)
GFR AFRICAN AMERICAN: 16 ML/MIN
GFR NON-AFRICAN AMERICAN: 13 ML/MIN
GFR SERPL CREATININE-BSD FRML MDRD: ABNORMAL ML/MIN/{1.73_M2}
GFR SERPL CREATININE-BSD FRML MDRD: ABNORMAL ML/MIN/{1.73_M2}
GLUCOSE BLD-MCNC: 85 MG/DL (ref 75–110)
GLUCOSE BLD-MCNC: 89 MG/DL (ref 70–99)
HCT VFR BLD CALC: 38.4 % (ref 40.7–50.3)
HEMOGLOBIN: 11.9 G/DL (ref 13–17)
IMMATURE GRANULOCYTES: 0 %
LIPASE: 13 U/L (ref 13–60)
LYMPHOCYTES # BLD: 17 % (ref 24–44)
Lab: NORMAL
MCH RBC QN AUTO: 29.8 PG (ref 25.2–33.5)
MCHC RBC AUTO-ENTMCNC: 31 G/DL (ref 28.4–34.8)
MCV RBC AUTO: 96 FL (ref 82.6–102.9)
MONOCYTES # BLD: 14 % (ref 1–7)
MORPHOLOGY: ABNORMAL
NRBC AUTOMATED: 0 PER 100 WBC
PDW BLD-RTO: 14.6 % (ref 11.8–14.4)
PLATELET # BLD: ABNORMAL K/UL (ref 138–453)
PLATELET ESTIMATE: ABNORMAL
PLATELET, FLUORESCENCE: 85 K/UL (ref 138–453)
PLATELET, IMMATURE FRACTION: 4.6 % (ref 1.1–10.3)
PMV BLD AUTO: ABNORMAL FL (ref 8.1–13.5)
POC TROPONIN I: 0.1 NG/ML (ref 0–0.1)
POC TROPONIN INTERP: NORMAL
POTASSIUM SERPL-SCNC: 4 MMOL/L (ref 3.7–5.3)
RBC # BLD: 4 M/UL (ref 4.21–5.77)
RBC # BLD: ABNORMAL 10*6/UL
SEG NEUTROPHILS: 61 % (ref 36–66)
SEGMENTED NEUTROPHILS ABSOLUTE COUNT: 2.2 K/UL (ref 1.8–7.7)
SODIUM BLD-SCNC: 138 MMOL/L (ref 135–144)
SPECIMEN DESCRIPTION: NORMAL
STATUS: NORMAL
TOTAL PROTEIN: 6.9 G/DL (ref 6.4–8.3)
TROPONIN INTERP: ABNORMAL
TROPONIN INTERP: ABNORMAL
TROPONIN T: 0.13 NG/ML
TROPONIN T: 0.14 NG/ML
WBC # BLD: 3.6 K/UL (ref 3.5–11.3)
WBC # BLD: ABNORMAL 10*3/UL

## 2018-02-09 PROCEDURE — 99285 EMERGENCY DEPT VISIT HI MDM: CPT

## 2018-02-09 PROCEDURE — 85025 COMPLETE CBC W/AUTO DIFF WBC: CPT

## 2018-02-09 PROCEDURE — 71046 X-RAY EXAM CHEST 2 VIEWS: CPT

## 2018-02-09 PROCEDURE — 2580000003 HC RX 258: Performed by: FAMILY MEDICINE

## 2018-02-09 PROCEDURE — 73030 X-RAY EXAM OF SHOULDER: CPT

## 2018-02-09 PROCEDURE — 73562 X-RAY EXAM OF KNEE 3: CPT

## 2018-02-09 PROCEDURE — 73010 X-RAY EXAM OF SHOULDER BLADE: CPT

## 2018-02-09 PROCEDURE — 70450 CT HEAD/BRAIN W/O DYE: CPT

## 2018-02-09 PROCEDURE — 6370000000 HC RX 637 (ALT 250 FOR IP): Performed by: PHYSICIAN ASSISTANT

## 2018-02-09 PROCEDURE — 2060000000 HC ICU INTERMEDIATE R&B

## 2018-02-09 PROCEDURE — 84484 ASSAY OF TROPONIN QUANT: CPT

## 2018-02-09 PROCEDURE — 72125 CT NECK SPINE W/O DYE: CPT

## 2018-02-09 PROCEDURE — 6370000000 HC RX 637 (ALT 250 FOR IP): Performed by: FAMILY MEDICINE

## 2018-02-09 PROCEDURE — 93005 ELECTROCARDIOGRAM TRACING: CPT

## 2018-02-09 PROCEDURE — 82947 ASSAY GLUCOSE BLOOD QUANT: CPT

## 2018-02-09 PROCEDURE — 80053 COMPREHEN METABOLIC PANEL: CPT

## 2018-02-09 PROCEDURE — 82306 VITAMIN D 25 HYDROXY: CPT

## 2018-02-09 PROCEDURE — 83690 ASSAY OF LIPASE: CPT

## 2018-02-09 PROCEDURE — 6360000002 HC RX W HCPCS: Performed by: FAMILY MEDICINE

## 2018-02-09 PROCEDURE — 85055 RETICULATED PLATELET ASSAY: CPT

## 2018-02-09 PROCEDURE — 87804 INFLUENZA ASSAY W/OPTIC: CPT

## 2018-02-09 RX ORDER — SODIUM CHLORIDE 0.9 % (FLUSH) 0.9 %
10 SYRINGE (ML) INJECTION PRN
Status: DISCONTINUED | OUTPATIENT
Start: 2018-02-09 | End: 2018-02-11 | Stop reason: HOSPADM

## 2018-02-09 RX ORDER — MORPHINE SULFATE 2 MG/ML
2 INJECTION, SOLUTION INTRAMUSCULAR; INTRAVENOUS
Status: DISCONTINUED | OUTPATIENT
Start: 2018-02-09 | End: 2018-02-11 | Stop reason: HOSPADM

## 2018-02-09 RX ORDER — POTASSIUM CHLORIDE 20MEQ/15ML
40 LIQUID (ML) ORAL PRN
Status: DISCONTINUED | OUTPATIENT
Start: 2018-02-09 | End: 2018-02-11 | Stop reason: HOSPADM

## 2018-02-09 RX ORDER — ACETAMINOPHEN 325 MG/1
650 TABLET ORAL EVERY 4 HOURS PRN
Status: DISCONTINUED | OUTPATIENT
Start: 2018-02-09 | End: 2018-02-11 | Stop reason: HOSPADM

## 2018-02-09 RX ORDER — OMEPRAZOLE 20 MG/1
CAPSULE, DELAYED RELEASE ORAL
Qty: 30 CAPSULE | Refills: 0 | Status: SHIPPED | OUTPATIENT
Start: 2018-02-09 | End: 2018-04-06 | Stop reason: SDUPTHER

## 2018-02-09 RX ORDER — HYDROCODONE BITARTRATE AND ACETAMINOPHEN 5; 325 MG/1; MG/1
1 TABLET ORAL ONCE
Status: COMPLETED | OUTPATIENT
Start: 2018-02-09 | End: 2018-02-09

## 2018-02-09 RX ORDER — PHENYTOIN SODIUM 100 MG/1
CAPSULE, EXTENDED RELEASE ORAL
Qty: 60 CAPSULE | Refills: 0 | OUTPATIENT
Start: 2018-02-09

## 2018-02-09 RX ORDER — MIRTAZAPINE 45 MG/1
TABLET, FILM COATED ORAL
Qty: 30 TABLET | Refills: 0 | OUTPATIENT
Start: 2018-02-09

## 2018-02-09 RX ORDER — BISACODYL 10 MG
10 SUPPOSITORY, RECTAL RECTAL DAILY PRN
Status: DISCONTINUED | OUTPATIENT
Start: 2018-02-09 | End: 2018-02-11 | Stop reason: HOSPADM

## 2018-02-09 RX ORDER — RIVASTIGMINE 4.6 MG/24H
1 PATCH, EXTENDED RELEASE TRANSDERMAL DAILY
Status: DISCONTINUED | OUTPATIENT
Start: 2018-02-10 | End: 2018-02-11

## 2018-02-09 RX ORDER — ACETAMINOPHEN 325 MG/1
650 TABLET ORAL EVERY 4 HOURS PRN
Status: DISCONTINUED | OUTPATIENT
Start: 2018-02-09 | End: 2018-02-09

## 2018-02-09 RX ORDER — ZIPRASIDONE HYDROCHLORIDE 40 MG/1
40 CAPSULE ORAL 2 TIMES DAILY WITH MEALS
Status: DISCONTINUED | OUTPATIENT
Start: 2018-02-10 | End: 2018-02-11 | Stop reason: HOSPADM

## 2018-02-09 RX ORDER — HEPARIN SODIUM 5000 [USP'U]/ML
5000 INJECTION, SOLUTION INTRAVENOUS; SUBCUTANEOUS EVERY 8 HOURS SCHEDULED
Status: DISCONTINUED | OUTPATIENT
Start: 2018-02-09 | End: 2018-02-11 | Stop reason: HOSPADM

## 2018-02-09 RX ORDER — PHENYTOIN SODIUM 100 MG/1
100 CAPSULE, EXTENDED RELEASE ORAL 3 TIMES DAILY
Status: DISCONTINUED | OUTPATIENT
Start: 2018-02-09 | End: 2018-02-11

## 2018-02-09 RX ORDER — SIMVASTATIN 10 MG
10 TABLET ORAL NIGHTLY
Status: DISCONTINUED | OUTPATIENT
Start: 2018-02-09 | End: 2018-02-11 | Stop reason: HOSPADM

## 2018-02-09 RX ORDER — POTASSIUM CHLORIDE 7.45 MG/ML
10 INJECTION INTRAVENOUS PRN
Status: DISCONTINUED | OUTPATIENT
Start: 2018-02-09 | End: 2018-02-11 | Stop reason: HOSPADM

## 2018-02-09 RX ORDER — CITALOPRAM 20 MG/1
20 TABLET ORAL DAILY
Status: DISCONTINUED | OUTPATIENT
Start: 2018-02-10 | End: 2018-02-11

## 2018-02-09 RX ORDER — MAGNESIUM SULFATE 1 G/100ML
1 INJECTION INTRAVENOUS PRN
Status: DISCONTINUED | OUTPATIENT
Start: 2018-02-09 | End: 2018-02-11 | Stop reason: HOSPADM

## 2018-02-09 RX ORDER — SODIUM CHLORIDE 0.9 % (FLUSH) 0.9 %
10 SYRINGE (ML) INJECTION EVERY 12 HOURS SCHEDULED
Status: DISCONTINUED | OUTPATIENT
Start: 2018-02-09 | End: 2018-02-11 | Stop reason: HOSPADM

## 2018-02-09 RX ORDER — MORPHINE SULFATE 4 MG/ML
4 INJECTION, SOLUTION INTRAMUSCULAR; INTRAVENOUS
Status: DISCONTINUED | OUTPATIENT
Start: 2018-02-09 | End: 2018-02-11 | Stop reason: HOSPADM

## 2018-02-09 RX ORDER — AMLODIPINE BESYLATE 5 MG/1
5 TABLET ORAL DAILY
Status: DISCONTINUED | OUTPATIENT
Start: 2018-02-10 | End: 2018-02-11

## 2018-02-09 RX ORDER — AMLODIPINE BESYLATE 5 MG/1
TABLET ORAL
Qty: 30 TABLET | Refills: 0 | OUTPATIENT
Start: 2018-02-09

## 2018-02-09 RX ORDER — ONDANSETRON 2 MG/ML
4 INJECTION INTRAMUSCULAR; INTRAVENOUS EVERY 6 HOURS PRN
Status: DISCONTINUED | OUTPATIENT
Start: 2018-02-09 | End: 2018-02-11 | Stop reason: HOSPADM

## 2018-02-09 RX ORDER — HYDROCODONE BITARTRATE AND ACETAMINOPHEN 5; 325 MG/1; MG/1
2 TABLET ORAL EVERY 4 HOURS PRN
Status: DISCONTINUED | OUTPATIENT
Start: 2018-02-09 | End: 2018-02-11 | Stop reason: HOSPADM

## 2018-02-09 RX ORDER — NICOTINE 21 MG/24HR
1 PATCH, TRANSDERMAL 24 HOURS TRANSDERMAL DAILY PRN
Status: DISCONTINUED | OUTPATIENT
Start: 2018-02-09 | End: 2018-02-11 | Stop reason: HOSPADM

## 2018-02-09 RX ORDER — ZIPRASIDONE HYDROCHLORIDE 40 MG/1
CAPSULE ORAL
Qty: 60 CAPSULE | Refills: 0 | OUTPATIENT
Start: 2018-02-09

## 2018-02-09 RX ORDER — LISINOPRIL 5 MG/1
5 TABLET ORAL DAILY
Status: DISCONTINUED | OUTPATIENT
Start: 2018-02-10 | End: 2018-02-11

## 2018-02-09 RX ORDER — METOPROLOL SUCCINATE 25 MG/1
12.5 TABLET, EXTENDED RELEASE ORAL NIGHTLY
Status: DISCONTINUED | OUTPATIENT
Start: 2018-02-09 | End: 2018-02-11

## 2018-02-09 RX ORDER — MEGESTROL ACETATE 40 MG/ML
400 SUSPENSION ORAL DAILY
Status: DISCONTINUED | OUTPATIENT
Start: 2018-02-10 | End: 2018-02-11 | Stop reason: HOSPADM

## 2018-02-09 RX ORDER — HYDROCODONE BITARTRATE AND ACETAMINOPHEN 5; 325 MG/1; MG/1
1 TABLET ORAL EVERY 4 HOURS PRN
Status: DISCONTINUED | OUTPATIENT
Start: 2018-02-09 | End: 2018-02-11 | Stop reason: HOSPADM

## 2018-02-09 RX ORDER — CLOPIDOGREL BISULFATE 75 MG/1
75 TABLET ORAL DAILY
Status: DISCONTINUED | OUTPATIENT
Start: 2018-02-10 | End: 2018-02-11 | Stop reason: HOSPADM

## 2018-02-09 RX ORDER — FOLIC ACID 1 MG/1
1 TABLET ORAL DAILY
Status: DISCONTINUED | OUTPATIENT
Start: 2018-02-10 | End: 2018-02-11 | Stop reason: HOSPADM

## 2018-02-09 RX ORDER — HYDROCODONE BITARTRATE AND ACETAMINOPHEN 5; 325 MG/1; MG/1
1 TABLET ORAL EVERY 4 HOURS PRN
Status: DISCONTINUED | OUTPATIENT
Start: 2018-02-09 | End: 2018-02-09

## 2018-02-09 RX ORDER — ONDANSETRON 4 MG/1
4 TABLET, FILM COATED ORAL EVERY 8 HOURS PRN
Status: DISCONTINUED | OUTPATIENT
Start: 2018-02-09 | End: 2018-02-11 | Stop reason: HOSPADM

## 2018-02-09 RX ORDER — PANTOPRAZOLE SODIUM 40 MG/1
40 TABLET, DELAYED RELEASE ORAL
Status: DISCONTINUED | OUTPATIENT
Start: 2018-02-10 | End: 2018-02-11 | Stop reason: HOSPADM

## 2018-02-09 RX ORDER — POTASSIUM CHLORIDE 20 MEQ/1
40 TABLET, EXTENDED RELEASE ORAL PRN
Status: DISCONTINUED | OUTPATIENT
Start: 2018-02-09 | End: 2018-02-11 | Stop reason: HOSPADM

## 2018-02-09 RX ADMIN — METOPROLOL SUCCINATE 12.5 MG: 25 TABLET, FILM COATED, EXTENDED RELEASE ORAL at 23:34

## 2018-02-09 RX ADMIN — HYDROCODONE BITARTRATE AND ACETAMINOPHEN 1 TABLET: 5; 325 TABLET ORAL at 18:57

## 2018-02-09 RX ADMIN — HYDROCODONE BITARTRATE AND ACETAMINOPHEN 1 TABLET: 5; 325 TABLET ORAL at 13:32

## 2018-02-09 RX ADMIN — MIRTAZAPINE 45 MG: 30 TABLET, FILM COATED ORAL at 23:34

## 2018-02-09 RX ADMIN — EXTENDED PHENYTOIN SODIUM 100 MG: 100 CAPSULE ORAL at 23:34

## 2018-02-09 RX ADMIN — Medication 10 ML: at 23:33

## 2018-02-09 RX ADMIN — HEPARIN SODIUM 5000 UNITS: 5000 INJECTION, SOLUTION INTRAVENOUS; SUBCUTANEOUS at 23:33

## 2018-02-09 RX ADMIN — SIMVASTATIN 10 MG: 10 TABLET, FILM COATED ORAL at 23:34

## 2018-02-09 ASSESSMENT — ENCOUNTER SYMPTOMS
EYES NEGATIVE: 1
CHEST TIGHTNESS: 0
RESPIRATORY NEGATIVE: 1
GASTROINTESTINAL NEGATIVE: 1
SHORTNESS OF BREATH: 0
ALLERGIC/IMMUNOLOGIC NEGATIVE: 1

## 2018-02-09 ASSESSMENT — PAIN SCALES - GENERAL
PAINLEVEL_OUTOF10: 8

## 2018-02-09 ASSESSMENT — PAIN DESCRIPTION - LOCATION: LOCATION: ABDOMEN;LEG

## 2018-02-09 ASSESSMENT — PAIN DESCRIPTION - PAIN TYPE: TYPE: ACUTE PAIN

## 2018-02-09 ASSESSMENT — PAIN DESCRIPTION - ORIENTATION: ORIENTATION: RIGHT

## 2018-02-09 NOTE — ED PROVIDER NOTES
hospital before and has been to a rehab center and daughter states he was getting on the house quite well. However, in the last week. He is just not been acting himself and feeling more. He takes a daily aspirin and he took his daily blood pressure medications today is not allergic to anything he used to smoke many years ago. Denies illicit drug use and he doesn't drink alcohol. He is a Tuesday, Thursday, Saturday dialysis patient and had dialysis yesterday as scheduled        PAST MEDICAL / SURGICAL / SOCIAL / FAMILY HISTORY      has a past medical history of Bell's palsy; Chronic kidney disease; Contusion; Depression; Diabetes mellitus ([address] Diarrhea; Frequent falls; Hyperkalemia; Hyperlipidemia; Hypertension; Neuropathy ([address] Obesity; Osteoarthritis; Renal cancer ([address] Renal failure; Seizures ([address] Sprain of hip or thigh, left; Unspecified cerebral artery occlusion with cerebral infarction; and Wears dentures. reviewed   has a past surgical history that includes craniotomy; Dialysis fistula creation (Left, x 2); Appendectomy; and Eye surgery. reviewed  Social History     Social History    Marital status: Single     Spouse name: N/A    Number of children: N/A    Years of education: N/A     Occupational History    Not on file. Social History Main Topics    Smoking status: Never Smoker    Smokeless tobacco: Never Used    Alcohol use No    Drug use: No    Sexual activity: Not on file     Other Topics Concern    Not on file     Social History Narrative    ** Merged History Encounter **            Family History   Problem Relation Age of Onset    Diabetes Mother     Stroke Mother     Coronary Art Dis Mother     Diabetes Father     No Known Problems Maternal Grandmother     No Known Problems Maternal Grandfather     No Known Problems Paternal Grandmother     No Known Problems Paternal Grandfather        Allergies:  Review of patient's allergies indicates no known allergies.     Home (TRUE METRIX BLOOD GLUCOSE TEST) strip 1 each by In Vitro route 3 times daily As needed. 11/16/16   Sidney Govea MD       REVIEW OF SYSTEMS    (2-9 systems for level 4, 10 or more for level 5)      Review of Systems   Constitutional: Positive for fatigue. HENT: Negative. Eyes: Negative. Respiratory: Negative. Negative for chest tightness and shortness of breath. Cardiovascular: Positive for chest pain (left lower rib pain but he denies \"chest pain\"). Gastrointestinal: Negative. Endocrine: Negative. Genitourinary: Negative. Does not make urine   Musculoskeletal: Negative. Left shoulder pain, right knee pain   Skin: Negative. Allergic/Immunologic: Negative. Neurological: Negative. Negative for numbness and headaches. Hematological: Negative. PHYSICAL EXAM   (up to 7 for level 4, 8 or more for level 5)      INITIAL VITALS:  height is 5' 9\" (1.753 m) and weight is 165 lb (74.8 kg). His oral temperature is 97.5 °F (36.4 °C). His blood pressure is 140/49 (abnormal) and his pulse is 74. His respiration is 15 and oxygen saturation is 99%. Physical Exam   Constitutional: He is oriented to person, place, and time. Vital signs are normal. He appears well-developed and well-nourished. HENT:   Head: Normocephalic and atraumatic. Right Ear: External ear normal.   Left Ear: External ear normal.   Nose: Nose normal.   Mouth/Throat: Oropharynx is clear and moist and mucous membranes are normal. No oropharyngeal exudate. Eyes: Conjunctivae and EOM are normal. Pupils are equal, round, and reactive to light. Right eye exhibits no discharge. Left eye exhibits no discharge. No scleral icterus. Pupil equal, round, reactive to light bilaterally at approximately 3 mm. Patient has bilateral visible cataracts. Extra ocular eye movements grossly intact bilaterally   Neck: Trachea normal, normal range of motion and full passive range of motion without pain.  No tracheal deviation present. No thyromegaly present. Anterior neck is soft, supple and symmetric. There is no lymphadenopathy appreciated preauricular, postauricular, submandibular, anterior, posterior cervical, bilaterally. There are no meningeal signs. Cardiovascular: Normal rate, regular rhythm, S1 normal, S2 normal, normal heart sounds and intact distal pulses. Pulses:       Radial pulses are 2+ on the right side, and 2+ on the left side. S1, S2, regular rate, rhythm. No murmur, rub or gallop. Patient has tenderness along his left lowest rib from mid axillary line to the middle portion of the anterior chest on the left   Pulmonary/Chest: Effort normal and breath sounds normal. No stridor. Clear to auscultation bilaterally. No wheezes, rales or rhonchi. Abdominal: Soft. Normal appearance and bowel sounds are normal.    Positive bowel sounds, soft, nontender, nondistended. No rebound or guarding   Genitourinary:   Genitourinary Comments:  No blood at the penile meatus   Musculoskeletal: Normal range of motion. Patient has no cervical, thoracic or lumbar spinous process step-offs. Patient has tenderness at the level of  Left scapula and left shoulder. However, his shoulder. Range of motion is fairly good bilaterally. Patient does have the area over the scapula. There is a walled off multiple area. That's about 10 cm that is not erythematous and it is tender to palpation. Patient has no before meals joint tenderness bilaterally. Patient also has tenderness of the right knee at the medial joint line as well as over the patella. No irregular patellar mobility or tibial tuberosity tenderness. No popliteal tenderness. No ligamentous instability of the right knee  Patient has 5/5 , bicep, tricep  bilaterally  Patient has 5/5 hip flexor, quadricep hamstring dorsi plantar flexion strength bilaterally. Sensation grossly intact in bilateral upper and lower extremity dermatomes.   Calf soft, 5-325 MG per tablet 1 tablet       Controlled Substances Monitoring:      DIAGNOSTIC RESULTS / EMERGENCY DEPARTMENT COURSE / MDM     RADIOLOGY:   I directly visualized (with the attending physician) the following  images and reviewed the radiologist interpretations:  Xr Chest Standard (2 Vw)    Result Date: 2/9/2018  EXAMINATION: TWO VIEWS OF THE CHEST 2/9/2018 2:17 pm COMPARISON: 09/02/2017 HISTORY: ORDERING SYSTEM PROVIDED HISTORY: s/p fall,  left lower rib pain, dementia, on dialysis. cough non productive with some rhonchi bilateral bases TECHNOLOGIST PROVIDED HISTORY: Reason for exam:->s/p fall,  left lower rib pain, dementia, on dialysis. cough non productive with some rhonchi bilateral bases FINDINGS: The stable cardiomegaly. Sign mild atelectasis in the left lung base. No pulmonary consolidation, edema, effusion or pneumothorax. Left hemidiaphragm is slightly elevated. Osseous structures are grossly unremarkable. Diffuse dorsal spondylosis. Underlying COPD. Mild atelectatic changes in the left lung base. Xr Scapula Left (complete)    Result Date: 2/9/2018  EXAMINATION: 3 VIEWS OF THE LEFT SHOULDER; 2 VIEWS OF THE LEFT SCAPULA 2/9/2018 2:17 pm COMPARISON: Left shoulder radiographs dated 11/25/2016. HISTORY: ORDERING SYSTEM PROVIDED HISTORY: s/p several falls yesterday. also has \"new\" area of fluctuance over the left scapula, feels like a lipoma. TECHNOLOGIST PROVIDED HISTORY: Reason for exam:->s/p several falls yesterday. also has \"new\" area of fluctuance over the left scapula, feels like a lipoma. FINDINGS: Left shoulder: There is no acute fracture or dislocation. Subacromial space appears to be preserved. AC joint is within normal limits. Left scapula: There is no evidence of acute fracture or displacement involving the left scapula. No acute osseous abnormality in the left shoulder or scapula.      Xr Knee Right (3 Views)    Result Date: 2/9/2018  EXAMINATION: 3 VIEWS OF THE RIGHT KNEE for Influenza A + B antigens. Direct Exam       PCR testing to confirm this result is available upon request.  Specimen will be    Direct Exam        saved in the laboratory for 7 days. Please call [phone] if PCR testing is    Direct Exam  indicated.      Direct Exam       [facility] [id number] [facility], [address] [place] [address] [phone]    Status FINAL 02/09/2018    CBC WITH AUTO DIFFERENTIAL   Result Value Ref Range    WBC 3.6 3.5 - 11.3 k/uL    RBC 4.00 (L) 4.21 - 5.77 m/uL    Hemoglobin 11.9 (L) 13.0 - 17.0 g/dL    Hematocrit 38.4 (L) 40.7 - 50.3 %    MCV 96.0 82.6 - 102.9 fL    MCH 29.8 25.2 - 33.5 pg    MCHC 31.0 28.4 - 34.8 g/dL    RDW 14.6 (H) 11.8 - 14.4 %    Platelets See Reflexed IPF Result 138 - 453 k/uL    MPV NOT REPORTED 8.1 - 13.5 fL    NRBC Automated 0.0 0.0 per 100 WBC    Differential Type NOT REPORTED     WBC Morphology NOT REPORTED     RBC Morphology NOT REPORTED     Platelet Estimate NOT REPORTED     Immature Granulocytes 0 0 %    Seg Neutrophils 61 36 - 66 %    Lymphocytes 17 (L) 24 - 44 %    Monocytes 14 (H) 1 - 7 %    Eosinophils % 8 (H) 1 - 4 %    Basophils 0 0 - 2 %    Absolute Immature Granulocyte 0.00 0.00 - 0.30 k/uL    Segs Absolute 2.20 1.8 - 7.7 k/uL    Absolute Lymph # 0.61 (L) 1.0 - 4.8 k/uL    Absolute Mono # 0.50 0.1 - 0.8 k/uL    Absolute Eos # 0.29 0.0 - 0.4 k/uL    Basophils # 0.00 0.0 - 0.2 k/uL    Morphology ANISOCYTOSIS PRESENT    Comprehensive Metabolic Panel   Result Value Ref Range    Glucose 89 70 - 99 mg/dL    BUN 26 (H) 8 - 23 mg/dL    CREATININE 4.50 (H) 0.70 - 1.20 mg/dL    Bun/Cre Ratio NOT REPORTED 9 - 20    Calcium 10.8 (H) 8.6 - 10.4 mg/dL    Sodium 138 135 - 144 mmol/L    Potassium 4.0 3.7 - 5.3 mmol/L    Chloride 94 (L) 98 - 107 mmol/L    CO2 30 20 - 31 mmol/L    Anion Gap 14 9 - 17 mmol/L    Alkaline Phosphatase 83 40 - 129 U/L    ALT 10 5 - 41 U/L    AST 24 <40 U/L    Total Bilirubin 0.46 0.3 - 1.2 mg/dL    Total Protein 6.9 6.4 - 8.3 g/dL    Alb 4.0

## 2018-02-09 NOTE — ED NOTES
Pt placed on 2L NC due to decreased O2 saturation of 85% while sleeping.       Carissa Houston RN  02/09/18 2699

## 2018-02-09 NOTE — ED PROVIDER NOTES
TECHNIQUE: CT of the head was performed without the administration of intravenous contrast. Dose modulation, iterative reconstruction, and/or weight based adjustment of the mA/kV was utilized to reduce the radiation dose to as low as reasonably achievable. COMPARISON: None. HISTORY: ORDERING SYSTEM PROVIDED HISTORY: s/p fall and hit head yesterday, dialysis dependent, not on blood thinners. TECHNOLOGIST PROVIDED HISTORY: Has a \"code stroke\" or \"stroke alert\" been called? ->No FINDINGS: BRAIN/VENTRICLES: There is no acute intracranial hemorrhage, mass effect or midline shift. No abnormal extra-axial fluid collection. The gray-white differentiation is maintained without evidence of an acute infarct. There is no evidence of hydrocephalus. ORBITS: The visualized portion of the orbits demonstrate no acute abnormality. SINUSES: Persistent opacification of the right mastoid tip. SOFT TISSUES/SKULL:  Status post left temporoparietal craniotomy. No acute intracranial abnormality. Postsurgical changes from previous left temporoparietal craniotomy. .     Ct Cervical Spine Wo Contrast    Result Date: 2/9/2018  EXAMINATION: CT OF THE CERVICAL SPINE WITHOUT CONTRAST 2/9/2018 1:29 pm TECHNIQUE: CT of the cervical spine was performed without the administration of intravenous contrast. Multiplanar reformatted images are provided for review. Dose modulation, iterative reconstruction, and/or weight based adjustment of the mA/kV was utilized to reduce the radiation dose to as low as reasonably achievable. COMPARISON: None. HISTORY: ORDERING SYSTEM PROVIDED HISTORY: s/p several falls yesterday and hit head. neurovasc intact but earlier today not acting right per daughter. FINDINGS: BONES/ALIGNMENT: There is no evidence of an acute cervical spine fracture. There is normal alignment of the cervical spine. DEGENERATIVE CHANGES: Advanced, multifocal degenerative change. SOFT TISSUES: Patient appears anemic.   There is heterogenous attenuation of the thyroid gland. Mildly prominent cervical chain lymph nodes. Superior mediastinal lymph nodes appear enlarged. 1. No CT evidence of acute trauma. 2. Advanced, multifocal degenerative change. 3. Supraclavicular adenopathy suspected. Xr Shoulder Left (min 2 Views)    Result Date: 2/9/2018  EXAMINATION: 3 VIEWS OF THE LEFT SHOULDER; 2 VIEWS OF THE LEFT SCAPULA 2/9/2018 2:17 pm COMPARISON: Left shoulder radiographs dated 11/25/2016. HISTORY: ORDERING SYSTEM PROVIDED HISTORY: s/p several falls yesterday. also has \"new\" area of fluctuance over the left scapula, feels like a lipoma. TECHNOLOGIST PROVIDED HISTORY: Reason for exam:->s/p several falls yesterday. also has \"new\" area of fluctuance over the left scapula, feels like a lipoma. FINDINGS: Left shoulder: There is no acute fracture or dislocation. Subacromial space appears to be preserved. AC joint is within normal limits. Left scapula: There is no evidence of acute fracture or displacement involving the left scapula. No acute osseous abnormality in the left shoulder or scapula. RECENT VITALS:     Temp: 97.5 °F (36.4 °C),  Pulse: 76, Resp: 14, BP: (!) 148/49, SpO2: 96 %    Sukh Bravo is a 69 y.o. male who presents with complaint of multiple falls. Dialysis patient. Elevated troponin. Admitted with cardiology consult. OUTSTANDING TASKS / RECOMMENDATIONS:    1.  Disposition made by previous attending and nothing to do      Sarah Zimmerman MD  Attending Emergency Physician  [facility]       Kathie Maguire MD  02/09/18 0604

## 2018-02-09 NOTE — Clinical Note
Patient Class: Inpatient [101]   REQUIRED: Diagnosis: NSTEMI (non-ST elevated myocardial infarction) [facility]) [[id number]]   Estimated Length of Stay: Estimated stay of more than 2 midnights   Future Attending Provider: Zoya Zavaleta [[id number]]   Admitting Provider: Zoya Zavaleta [[id number]]   Preferred Department: step down   Telemetry Bed Required?: Yes

## 2018-02-09 NOTE — ED PROVIDER NOTES
Problem: Respiratory Impairment - Respiratory Therapy 253  Intervention: Airway clearance techniques  Intervention Status  Done         thoracic spinal tenderness. Abdomen is soft he does have some tenderness across the lower ribs anteriorly no crepitus is palpated. Impression: Fall, fatigue    Plan: CT scan, x-ray, EKG, lab work      EKG Interpretation    Interpreted by me  Normal sinus rhythm with a ventricular rate of 78, left anterior fascicular block, left axis deviation, normal. Normal, normal QT corrected, no acute ST or T wave changes noted  Compared to EKG of August 1, 2017 no significant change was noted      Ross Burleson MD, [facility]  Attending Emergency Medicine Physician        Nolberto Mitchell MD  02/09/18 5516

## 2018-02-10 ENCOUNTER — APPOINTMENT (OUTPATIENT)
Dept: DIALYSIS | Age: 68
DRG: 073 | End: 2018-02-10
Payer: MEDICARE

## 2018-02-10 LAB
ABSOLUTE EOS #: 0.64 K/UL (ref 0–0.44)
ABSOLUTE IMMATURE GRANULOCYTE: <0.03 K/UL (ref 0–0.3)
ABSOLUTE LYMPH #: 0.81 K/UL (ref 1.1–3.7)
ABSOLUTE MONO #: 0.67 K/UL (ref 0.1–1.2)
ALBUMIN SERPL-MCNC: 3.2 G/DL (ref 3.5–5.2)
ALBUMIN/GLOBULIN RATIO: 1.1 (ref 1–2.5)
ALP BLD-CCNC: 72 U/L (ref 40–129)
ALT SERPL-CCNC: 10 U/L (ref 5–41)
ANION GAP SERPL CALCULATED.3IONS-SCNC: 14 MMOL/L (ref 9–17)
AST SERPL-CCNC: 18 U/L
BASOPHILS # BLD: 1 % (ref 0–2)
BASOPHILS ABSOLUTE: 0.04 K/UL (ref 0–0.2)
BILIRUB SERPL-MCNC: 0.36 MG/DL (ref 0.3–1.2)
BUN BLDV-MCNC: 35 MG/DL (ref 8–23)
BUN/CREAT BLD: ABNORMAL (ref 9–20)
CALCIUM SERPL-MCNC: 10.1 MG/DL (ref 8.6–10.4)
CHLORIDE BLD-SCNC: 93 MMOL/L (ref 98–107)
CO2: 28 MMOL/L (ref 20–31)
CREAT SERPL-MCNC: 5.46 MG/DL (ref 0.7–1.2)
DIFFERENTIAL TYPE: ABNORMAL
EOSINOPHILS RELATIVE PERCENT: 12 % (ref 1–4)
GFR AFRICAN AMERICAN: 13 ML/MIN
GFR NON-AFRICAN AMERICAN: 10 ML/MIN
GFR SERPL CREATININE-BSD FRML MDRD: ABNORMAL ML/MIN/{1.73_M2}
GFR SERPL CREATININE-BSD FRML MDRD: ABNORMAL ML/MIN/{1.73_M2}
GLUCOSE BLD-MCNC: 76 MG/DL (ref 70–99)
HCT VFR BLD CALC: 34.2 % (ref 40.7–50.3)
HEMOGLOBIN: 10.6 G/DL (ref 13–17)
IMMATURE GRANULOCYTES: 0 %
INR BLD: 1.1
KEPPRA: <2 UG/ML
LYMPHOCYTES # BLD: 15 % (ref 24–43)
MCH RBC QN AUTO: 29.7 PG (ref 25.2–33.5)
MCHC RBC AUTO-ENTMCNC: 31 G/DL (ref 28.4–34.8)
MCV RBC AUTO: 95.8 FL (ref 82.6–102.9)
MONOCYTES # BLD: 13 % (ref 3–12)
MYOGLOBIN: 195 NG/ML (ref 28–72)
MYOGLOBIN: 228 NG/ML (ref 28–72)
NRBC AUTOMATED: 0 PER 100 WBC
PDW BLD-RTO: 14.7 % (ref 11.8–14.4)
PHENYTOIN DATE LAST DOSE: ABNORMAL
PHENYTOIN DOSE AMOUNT: ABNORMAL
PHENYTOIN DOSE TIME: ABNORMAL
PHENYTOIN FREE: 0.5 UG/ML (ref 1–2)
PHENYTOIN LEVEL: 2.6 UG/ML (ref 10–20)
PLATELET # BLD: 92 K/UL (ref 138–453)
PLATELET ESTIMATE: ABNORMAL
PMV BLD AUTO: 11.4 FL (ref 8.1–13.5)
POTASSIUM SERPL-SCNC: 4.3 MMOL/L (ref 3.7–5.3)
PROTHROMBIN TIME: 11.4 SEC (ref 9.4–12.6)
RBC # BLD: 3.57 M/UL (ref 4.21–5.77)
RBC # BLD: ABNORMAL 10*6/UL
SEG NEUTROPHILS: 59 % (ref 36–65)
SEGMENTED NEUTROPHILS ABSOLUTE COUNT: 3.19 K/UL (ref 1.5–8.1)
SODIUM BLD-SCNC: 135 MMOL/L (ref 135–144)
TOTAL PROTEIN: 6.1 G/DL (ref 6.4–8.3)
TROPONIN INTERP: ABNORMAL
TROPONIN INTERP: ABNORMAL
TROPONIN T: 0.14 NG/ML
TROPONIN T: 0.16 NG/ML
TSH SERPL DL<=0.05 MIU/L-ACNC: 2.12 MIU/L (ref 0.3–5)
VITAMIN D 25-HYDROXY: 11.3 NG/ML (ref 30–100)
WBC # BLD: 5.4 K/UL (ref 3.5–11.3)
WBC # BLD: ABNORMAL 10*3/UL

## 2018-02-10 PROCEDURE — G8988 SELF CARE GOAL STATUS: HCPCS

## 2018-02-10 PROCEDURE — 6360000002 HC RX W HCPCS: Performed by: NURSE PRACTITIONER

## 2018-02-10 PROCEDURE — G8979 MOBILITY GOAL STATUS: HCPCS

## 2018-02-10 PROCEDURE — 97162 PT EVAL MOD COMPLEX 30 MIN: CPT

## 2018-02-10 PROCEDURE — 84484 ASSAY OF TROPONIN QUANT: CPT

## 2018-02-10 PROCEDURE — 6370000000 HC RX 637 (ALT 250 FOR IP): Performed by: NURSE PRACTITIONER

## 2018-02-10 PROCEDURE — 83874 ASSAY OF MYOGLOBIN: CPT

## 2018-02-10 PROCEDURE — 90937 HEMODIALYSIS REPEATED EVAL: CPT

## 2018-02-10 PROCEDURE — 80053 COMPREHEN METABOLIC PANEL: CPT

## 2018-02-10 PROCEDURE — 85025 COMPLETE CBC W/AUTO DIFF WBC: CPT

## 2018-02-10 PROCEDURE — 6370000000 HC RX 637 (ALT 250 FOR IP): Performed by: FAMILY MEDICINE

## 2018-02-10 PROCEDURE — 97116 GAIT TRAINING THERAPY: CPT

## 2018-02-10 PROCEDURE — 97166 OT EVAL MOD COMPLEX 45 MIN: CPT

## 2018-02-10 PROCEDURE — 2580000003 HC RX 258: Performed by: FAMILY MEDICINE

## 2018-02-10 PROCEDURE — 36415 COLL VENOUS BLD VENIPUNCTURE: CPT

## 2018-02-10 PROCEDURE — 6360000002 HC RX W HCPCS: Performed by: FAMILY MEDICINE

## 2018-02-10 PROCEDURE — 80186 ASSAY OF PHENYTOIN FREE: CPT

## 2018-02-10 PROCEDURE — 85610 PROTHROMBIN TIME: CPT

## 2018-02-10 PROCEDURE — 97530 THERAPEUTIC ACTIVITIES: CPT

## 2018-02-10 PROCEDURE — 84443 ASSAY THYROID STIM HORMONE: CPT

## 2018-02-10 PROCEDURE — 80177 DRUG SCRN QUAN LEVETIRACETAM: CPT

## 2018-02-10 PROCEDURE — 80185 ASSAY OF PHENYTOIN TOTAL: CPT

## 2018-02-10 PROCEDURE — G8987 SELF CARE CURRENT STATUS: HCPCS

## 2018-02-10 PROCEDURE — 2060000000 HC ICU INTERMEDIATE R&B

## 2018-02-10 PROCEDURE — 6360000002 HC RX W HCPCS: Performed by: INTERNAL MEDICINE

## 2018-02-10 PROCEDURE — 97535 SELF CARE MNGMENT TRAINING: CPT

## 2018-02-10 PROCEDURE — 99222 1ST HOSP IP/OBS MODERATE 55: CPT | Performed by: FAMILY MEDICINE

## 2018-02-10 PROCEDURE — G8978 MOBILITY CURRENT STATUS: HCPCS

## 2018-02-10 PROCEDURE — 5A1D70Z PERFORMANCE OF URINARY FILTRATION, INTERMITTENT, LESS THAN 6 HOURS PER DAY: ICD-10-PCS | Performed by: INTERNAL MEDICINE

## 2018-02-10 RX ORDER — DIPHENHYDRAMINE HCL 25 MG
25 TABLET ORAL EVERY 6 HOURS PRN
Status: DISCONTINUED | OUTPATIENT
Start: 2018-02-10 | End: 2018-02-11 | Stop reason: HOSPADM

## 2018-02-10 RX ORDER — DIPHENHYDRAMINE HYDROCHLORIDE, ZINC ACETATE 2; .1 G/100G; G/100G
CREAM TOPICAL 3 TIMES DAILY PRN
Status: DISCONTINUED | OUTPATIENT
Start: 2018-02-10 | End: 2018-02-11 | Stop reason: HOSPADM

## 2018-02-10 RX ORDER — 0.9 % SODIUM CHLORIDE 0.9 %
250 INTRAVENOUS SOLUTION INTRAVENOUS PRN
Status: DISCONTINUED | OUTPATIENT
Start: 2018-02-10 | End: 2018-02-11 | Stop reason: HOSPADM

## 2018-02-10 RX ORDER — DIPHENHYDRAMINE HYDROCHLORIDE 50 MG/ML
25 INJECTION INTRAMUSCULAR; INTRAVENOUS ONCE
Status: DISCONTINUED | OUTPATIENT
Start: 2018-02-10 | End: 2018-02-11 | Stop reason: HOSPADM

## 2018-02-10 RX ORDER — 0.9 % SODIUM CHLORIDE 0.9 %
150 INTRAVENOUS SOLUTION INTRAVENOUS PRN
Status: DISCONTINUED | OUTPATIENT
Start: 2018-02-10 | End: 2018-02-11 | Stop reason: HOSPADM

## 2018-02-10 RX ADMIN — ZIPRASIDONE HYDROCHLORIDE 40 MG: 40 CAPSULE ORAL at 09:27

## 2018-02-10 RX ADMIN — CLOPIDOGREL 75 MG: 75 TABLET, FILM COATED ORAL at 09:27

## 2018-02-10 RX ADMIN — EXTENDED PHENYTOIN SODIUM 100 MG: 100 CAPSULE ORAL at 19:58

## 2018-02-10 RX ADMIN — DARBEPOETIN ALFA 60 MCG: 60 INJECTION, SOLUTION INTRAVENOUS; SUBCUTANEOUS at 16:51

## 2018-02-10 RX ADMIN — SIMVASTATIN 10 MG: 10 TABLET, FILM COATED ORAL at 19:58

## 2018-02-10 RX ADMIN — HEPARIN SODIUM 5000 UNITS: 5000 INJECTION, SOLUTION INTRAVENOUS; SUBCUTANEOUS at 07:03

## 2018-02-10 RX ADMIN — EXTENDED PHENYTOIN SODIUM 100 MG: 100 CAPSULE ORAL at 09:27

## 2018-02-10 RX ADMIN — PANTOPRAZOLE SODIUM 40 MG: 40 TABLET, DELAYED RELEASE ORAL at 07:03

## 2018-02-10 RX ADMIN — AMLODIPINE BESYLATE 5 MG: 5 TABLET ORAL at 10:49

## 2018-02-10 RX ADMIN — METOPROLOL SUCCINATE 12.5 MG: 25 TABLET, FILM COATED, EXTENDED RELEASE ORAL at 19:57

## 2018-02-10 RX ADMIN — Medication 10 ML: at 19:56

## 2018-02-10 RX ADMIN — HEPARIN SODIUM 5000 UNITS: 5000 INJECTION, SOLUTION INTRAVENOUS; SUBCUTANEOUS at 22:00

## 2018-02-10 RX ADMIN — FOLIC ACID 1 MG: 1 TABLET ORAL at 09:27

## 2018-02-10 RX ADMIN — DIPHENHYDRAMINE HCL 25 MG: 25 TABLET ORAL at 22:53

## 2018-02-10 RX ADMIN — HYDROCODONE BITARTRATE AND ACETAMINOPHEN 2 TABLET: 5; 325 TABLET ORAL at 19:50

## 2018-02-10 RX ADMIN — ONDANSETRON HYDROCHLORIDE 4 MG: 4 TABLET, FILM COATED ORAL at 22:00

## 2018-02-10 RX ADMIN — CITALOPRAM 20 MG: 20 TABLET, FILM COATED ORAL at 09:28

## 2018-02-10 RX ADMIN — MIRTAZAPINE 45 MG: 30 TABLET, FILM COATED ORAL at 19:57

## 2018-02-10 RX ADMIN — LISINOPRIL 5 MG: 5 TABLET ORAL at 09:27

## 2018-02-10 ASSESSMENT — PAIN DESCRIPTION - LOCATION: LOCATION: BACK;KNEE

## 2018-02-10 ASSESSMENT — PAIN SCALES - GENERAL
PAINLEVEL_OUTOF10: 3
PAINLEVEL_OUTOF10: 7

## 2018-02-10 ASSESSMENT — PAIN DESCRIPTION - PAIN TYPE: TYPE: ACUTE PAIN

## 2018-02-10 ASSESSMENT — PAIN DESCRIPTION - ONSET: ONSET: ON-GOING

## 2018-02-10 ASSESSMENT — PAIN DESCRIPTION - ORIENTATION: ORIENTATION: LEFT

## 2018-02-10 ASSESSMENT — PAIN DESCRIPTION - FREQUENCY: FREQUENCY: INTERMITTENT

## 2018-02-10 ASSESSMENT — PAIN DESCRIPTION - DESCRIPTORS: DESCRIPTORS: ACHING

## 2018-02-10 NOTE — PROGRESS NOTES
placement on RW during session. Pt demo \"plop\" tech when completing stand > sit transfers with poor safety awareness during transfers. Pt assisted to complete ADL activites as documented above seated in chair. Pt assisted to complete sit > stand transfer and steps to transport bed to HD. Tone RUE  RUE Tone: Normotonic  Tone LUE  LUE Tone: Normotonic  Coordination  Movements Are Fluid And Coordinated: No  Coordination and Movement description: Right UE;Left UE;Decreased speed     Bed mobility  Rolling to Right: Minimal assistance  Supine to Sit: Minimal assistance  Sit to Supine: Minimal assistance  Scooting: Contact guard assistance  Transfers  Stand Step Transfers: Moderate assistance  Sit to stand: Minimal assistance  Stand to sit: Minimal assistance  Transfer Comments: Verbal cues for hand placement on RW. Unsafe \"plop\" tech noted during functional transfers, required verbal and tactile cues for safety     Cognition  Overall Cognitive Status: Exceptions  Memory: Decreased recall of precautions  Safety Judgement: Decreased awareness of need for assistance;Decreased awareness of need for safety  Perception  Overall Perceptual Status: Impaired  Unilateral Attention: Cues to maintain midline in sitting;Cues to maintain midline in standing     Sensation  Overall Sensation Status: WFL      LUE AROM (degrees)  LUE AROM : WFL  RUE AROM (degrees)  RUE AROM : WFL  LUE Strength  Gross LUE Strength: Exceptions to [facility]  RUE Strength  Gross RUE Strength: Exceptions to [facility]    Assessment   Performance deficits / Impairments: Decreased ADL status; Decreased functional mobility ; Decreased endurance;Decreased safe awareness  Assessment: Pt would benefit from additional acute care and post-acute care therapy services prior to a safe discharge home to address moderate deficits in ADL/ functional activities, endurance and functional transfers/ mobility.  Pt demo poor safety awareness and required mod A to complete functional transfers/ mobility and manage RW on this date. Prognosis: Fair  Decision Making: Medium Complexity  Patient Education: OT POC, safety  with use of RW, discharge planning   REQUIRES OT FOLLOW UP: Yes  Activity Tolerance  Activity Tolerance: Patient Tolerated treatment well  Safety Devices  Safety Devices in place: Not Applicable (left on transport bed to HD, RN present)  Restraints  Initially in place: No  OT Equipment Recommendations  Equipment Needed: No        Discharge Recommendations:     Plan   Plan  Times per week: 3-4x/wk  Current Treatment Recommendations: Functional Mobility Training, Endurance Training, Safety Education & Training, Self-Care / ADL, Patient/Caregiver Education & Training, Equipment Evaluation, Education, & procurement    G-Code  OT G-codes  Functional Assessment Tool Used: [facility]  Score: 16/24  Functional Limitation: Self care  Self Care Current Status (): At least 40 percent but less than 60 percent impaired, limited or restricted  Self Care Goal Status ():  At least 20 percent but less than 40 percent impaired, limited or restricted    AM-PAC Score  AM-PAC Inpatient Daily Activity Raw Score: 16  AM-PAC Inpatient ADL T-Scale Score : 35.96  ADL Inpatient CMS 0-100% Score: 53.32  ADL Inpatient CMS G-Code Modifier : CK    Goals  Short term goals  Time Frame for Short term goals: by discharge, pt will  Short term goal 1: demo I in UE ADL activites   Short term goal 2: demo min A for LE ADL activites with AD as needed  Short term goal 3: demo understanding and I use of EC/WS, fall prevention and proper pursed lipped breathing tech during functional activites   Short term goal 4: demo understanding and use of safe transfer tech during functional activites with 1x verbal cues and RW  Short term goal 5: demo CGA for functional transfers/ mobility to assist with ADL/ functional activites   Short term goal 6: demo increased activity tolerance of 20+ min to assist with ADL/ functional activites Therapy Time   Individual Concurrent Group Co-treatment   Time In 5978         Time Out 1450         Minutes 26          See above for LOF. RN reports patient is medically stable for therapy treatment this date. Chart reviewed prior to treatment and patient is agreeable for therapy. All lines intact and patient positioned comfortably at end of treatment. All patient needs addressed prior to ending therapy session.        Daniel Sosa, OTR//L

## 2018-02-10 NOTE — CONSULTS
Renal Consult Note    Patient :  Karlee Cai; 69 y.o. MRN# [id number]  Location:  3027/3027-01  Attending:  Linda Treviño MD  Admit Date:  2/9/2018   Hospital Day: 1    Patient seen in dialysis  Reason for Consult:     Asked by Dr Linda Treviño MD to see for ESRD on HD management. History Obtained From:     patient    HD Access:     previous  Left AV fistula    [facility]    Nephrologist:       Dr. Yaz Rai    Dry Weight:       Not known    Admission Weight:       74.8 kg      History of Present Illness:     Karlee Cai; 69 y.o. male with past medical history as mentioned below presented to the hospital with the chief complaint of falls. 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 Yaz Rai. .  His dry weight is not known. Admitted with 74.8. He had his complete dialysis on Thursday. Patient states he has been falling more recently and fell 3 times day before yesterday onto carpet, did not hit his head,no loss of consciousness. On admission patient complained of right knee pain, left sided chest pain and left shoulder pain. Patient stats he is loosing his balance and having more falls lately. Xray left shoulder, right knee and chest did not show any fracture. Past medical hx of dementia, hx of HTN, seizure disorder. Past History/Allergies? Social History:     Past Medical History:   Diagnosis Date    Bell's palsy 9/13/2011    right     Chronic kidney disease     Contusion 9/13/2011    cheek     Depression     Diabetes mellitus ([address]     Diarrhea 2/4/2014    Frequent falls     Hyperkalemia 12/26/2013    Hyperlipidemia     Hypertension     Neuropathy ([address]     diabetic    Obesity     Osteoarthritis     Renal cancer ([address]     s/p embolization    Renal failure     Seizures ([address]     Sprain of hip or thigh, left 9/13/2011    Unspecified cerebral artery occlusion with cerebral infarction     Wears dentures     upper and lower full       No Known Allergies    Social History     Social History    Marital status: Single     Spouse name: N/A    Number of children: N/A    Years of education: N/A     Occupational History    Not on file.      Social History Main Topics    Smoking status: Never Smoker    Smokeless tobacco: Never Used    Alcohol use No    Drug use: No    Sexual activity: Not on file     Other Topics Concern    Not on file     Social History Narrative    ** Merged History Encounter **            Family History:        Family History   Problem Relation Age of Onset    Diabetes Mother     Stroke Mother     Coronary Art Dis Mother     Diabetes Father     No Known Problems Maternal Grandmother     No Known Problems Maternal Grandfather     No Known Problems Paternal Grandmother     No Known Problems Paternal Grandfather        Outpatient Medications:     Prescriptions Prior to Admission: folic acid (FOLVITE) 1 MG tablet, TAKE 1 TABLET DAILY  lisinopril (PRINIVIL;ZESTRIL) 5 MG tablet, TAKE 1 TABLET BY MOUTH DAILY (MON, WED, FRI, AND SUN)  rivastigmine (EXELON) 4.6 MG/24HR, APPLY 1 PATCH TO SKIN DAILY  metoprolol succinate (TOPROL XL) 25 MG extended release tablet, TAKE ONE-HALF OF A TABLET IN THE EVENING  mirtazapine (REMERON) 45 MG tablet, Take 1 tablet by mouth nightly  ziprasidone (GEODON) 40 MG capsule, Take 1 capsule by mouth 2 times daily (with meals)  amLODIPine (NORVASC) 5 MG tablet, Take 1 tablet by mouth daily  omeprazole (PRILOSEC) 20 MG delayed release capsule, TAKE 1 CAPSULE DAILY  acetaminophen (TYLENOL) 325 MG tablet, Take 2 tablets by mouth every 6 hours as needed for Pain or Fever  phenytoin (DILANTIN) 100 MG ER capsule, Take 1 capsule by mouth 3 times daily  clopidogrel (PLAVIX) 75 MG tablet, TAKE 1 TABLET DAILY  simvastatin (ZOCOR) 10 MG tablet, TAKE 1 TABLET AT BEDTIME  levETIRAcetam (KEPPRA) 500 MG tablet, Take 1 tablet by mouth 2 times daily  megestrol acetate (MEGACE) 400 MG/10ML SUSP, Take by mouth daily 10 ml by mouth daily  ondansetron (ZOFRAN ODT) 4 MG disintegrating tablet, Take 1 tablet by mouth every 8 hours as needed for Nausea  citalopram (CELEXA) 40 MG tablet, Take 40 mg by mouth daily  glucose blood VI test strips (TRUE METRIX BLOOD GLUCOSE TEST) strip, 1 each by In Vitro route 3 times daily As needed. Current Medications:     Scheduled Meds:    diphenhydrAMINE  25 mg Intravenous Once    sodium chloride flush  10 mL Intravenous 2 times per day    amLODIPine  5 mg Oral Daily    citalopram  20 mg Oral Daily    clopidogrel  75 mg Oral Daily    folic acid  1 mg Oral Daily    ziprasidone  40 mg Oral BID WC    simvastatin  10 mg Oral Nightly    rivastigmine  1 patch Transdermal Daily    pantoprazole  40 mg Oral QAM AC    mirtazapine  45 mg Oral Nightly    metoprolol succinate  12.5 mg Oral Nightly    lisinopril  5 mg Oral Daily    megestrol  400 mg Oral Daily    phenytoin  100 mg Oral TID    sodium chloride flush  10 mL Intravenous 2 times per day    heparin (porcine)  5,000 Units Subcutaneous 3 times per day     Continuous Infusions:    PRN Meds:  sodium chloride flush, HYDROcodone 5 mg - acetaminophen **OR** HYDROcodone 5 mg - acetaminophen, ondansetron, sodium chloride flush, potassium chloride **OR** potassium chloride **OR** potassium chloride, magnesium sulfate, acetaminophen, morphine **OR** morphine, magnesium hydroxide, bisacodyl, ondansetron, nicotine    Review of Systems:     Constitutional: +ve for fatigue  HEENT:  No headache, otalgia, itchy eyes, nasal discharge or sore throat. Cardiac:  +ve for left lower ribs pain  Chest:  No cough, phlegm or wheezing. Abdomen:  No abdominal pain, nausea or vomiting. Neuro:  No focal weakness, abnormal movements orseizure like activity. Skin:   No rashes, no itching.   :   No hematuria, no

## 2018-02-10 NOTE — H&P
([facility]     diabetic    Obesity     Osteoarthritis     [facility]     s/p embolization    Renal failure     Seizures ([facility]     Sprain of hip or thigh, left 9/13/2011    Unspecified cerebral artery occlusion with cerebral infarction     Wears dentures     upper and lower full        Past Surgical History:     Past Surgical History:   Procedure Laterality Date    APPENDECTOMY      CRANIOTOMY      brain bleed    DIALYSIS FISTULA CREATION Left x 2    LUE    EYE SURGERY      laser        Medications Prior to Admission:     Prior to Admission medications    Medication Sig Start Date End Date Taking?  Authorizing Provider   folic acid (FOLVITE) 1 MG tablet TAKE 1 TABLET DAILY 1/18/18  Yes Roseanne Wheeler MD   lisinopril (PRINIVIL;ZESTRIL) 5 MG tablet TAKE 1 TABLET BY MOUTH DAILY (MON, WED, FRI, AND SUN) 1/18/18  Yes Roseanne Wheeler MD   rivastigmine (EXELON) 4.6 MG/24HR APPLY 1 PATCH TO SKIN DAILY 12/20/17  Yes Mitchel Levine MD   metoprolol succinate (TOPROL XL) 25 MG extended release tablet TAKE ONE-HALF OF A TABLET IN THE EVENING 12/15/17  Yes Roseanne Wheeler MD   mirtazapine (REMERON) 45 MG tablet Take 1 tablet by mouth nightly 9/6/17  Yes Dimitris Swan MD   ziprasidone (GEODON) 40 MG capsule Take 1 capsule by mouth 2 times daily (with meals) 9/6/17  Yes Dimitris Swan MD   amLODIPine (NORVASC) 5 MG tablet Take 1 tablet by mouth daily 9/7/17  Yes Dimitris Swan MD   omeprazole (PRILOSEC) 20 MG delayed release capsule TAKE 1 CAPSULE DAILY 2/9/18   Mitchel Levine MD   acetaminophen (TYLENOL) 325 MG tablet Take 2 tablets by mouth every 6 hours as needed for Pain or Fever 9/6/17   Dimitris Swan MD   phenytoin (DILANTIN) 100 MG ER capsule Take 1 capsule by mouth 3 times daily 9/6/17   Dimitris Swan MD   clopidogrel (PLAVIX) 75 MG tablet TAKE 1 TABLET DAILY 8/10/17   Roseanne Wheeler MD   simvastatin (ZOCOR) 10 MG tablet TAKE 1 TABLET AT BEDTIME 8/10/17 435 ms    P Axis 26 degrees    R Axis -52 degrees    T Axis 76 degrees   RAPID INFLUENZA A/B ANTIGENS    Collection Time: 02/09/18 12:30 PM   Result Value Ref Range    Specimen Description . NASOPHARYNGEAL SWAB     Special Requests NOT REPORTED     Direct Exam PRESUMPTIVE NEGATIVE for Influenza A + B antigens. Direct Exam       PCR testing to confirm this result is available upon request.  Specimen will be    Direct Exam        saved in the laboratory for 7 days. Please call [phone] if PCR testing is    Direct Exam  indicated.      Direct Exam       [employer] [address] [facility], [address] [place] [address] [phone]    Status FINAL 02/09/2018    CBC WITH AUTO DIFFERENTIAL    Collection Time: 02/09/18  1:10 PM   Result Value Ref Range    WBC 3.6 3.5 - 11.3 k/uL    RBC 4.00 (L) 4.21 - 5.77 m/uL    Hemoglobin 11.9 (L) 13.0 - 17.0 g/dL    Hematocrit 38.4 (L) 40.7 - 50.3 %    MCV 96.0 82.6 - 102.9 fL    MCH 29.8 25.2 - 33.5 pg    MCHC 31.0 28.4 - 34.8 g/dL    RDW 14.6 (H) 11.8 - 14.4 %    Platelets See Reflexed IPF Result 138 - 453 k/uL    MPV NOT REPORTED 8.1 - 13.5 fL    NRBC Automated 0.0 0.0 per 100 WBC    Differential Type NOT REPORTED     WBC Morphology NOT REPORTED     RBC Morphology NOT REPORTED     Platelet Estimate NOT REPORTED     Immature Granulocytes 0 0 %    Seg Neutrophils 61 36 - 66 %    Lymphocytes 17 (L) 24 - 44 %    Monocytes 14 (H) 1 - 7 %    Eosinophils % 8 (H) 1 - 4 %    Basophils 0 0 - 2 %    Absolute Immature Granulocyte 0.00 0.00 - 0.30 k/uL    Segs Absolute 2.20 1.8 - 7.7 k/uL    Absolute Lymph # 0.61 (L) 1.0 - 4.8 k/uL    Absolute Mono # 0.50 0.1 - 0.8 k/uL    Absolute Eos # 0.29 0.0 - 0.4 k/uL    Basophils # 0.00 0.0 - 0.2 k/uL    Morphology ANISOCYTOSIS PRESENT    Comprehensive Metabolic Panel    Collection Time: 02/09/18  1:10 PM   Result Value Ref Range    Glucose 89 70 - 99 mg/dL    BUN 26 (H) 8 - 23 mg/dL    CREATININE 4.50 (H) 0.70 - 1.20 mg/dL    Bun/Cre Ratio NOT REPORTED 9 - 20 Calcium 10.8 (H) 8.6 - 10.4 mg/dL    Sodium 138 135 - 144 mmol/L    Potassium 4.0 3.7 - 5.3 mmol/L    Chloride 94 (L) 98 - 107 mmol/L    CO2 30 20 - 31 mmol/L    Anion Gap 14 9 - 17 mmol/L    Alkaline Phosphatase 83 40 - 129 U/L    ALT 10 5 - 41 U/L    AST 24 <40 U/L    Total Bilirubin 0.46 0.3 - 1.2 mg/dL    Total Protein 6.9 6.4 - 8.3 g/dL    Alb 4.0 3.5 - 5.2 g/dL    Albumin/Globulin Ratio 1.4 1.0 - 2.5    GFR Non-African American 13 (L) >60 mL/min    GFR  16 (L) >60 mL/min    GFR Comment          GFR Staging NOT REPORTED    LIPASE    Collection Time: 02/09/18  1:10 PM   Result Value Ref Range    Lipase 13 13 - 60 U/L   Immature Platelet Fraction    Collection Time: 02/09/18  1:10 PM   Result Value Ref Range    Platelet, Immature Fraction 4.6 1.1 - 10.3 %    Platelet, Fluorescence 85 (L) 138 - 453 k/uL   POCT troponin    Collection Time: 02/09/18  1:10 PM   Result Value Ref Range    POC Troponin I 0.10 0.00 - 0.10 ng/mL    POC Troponin Interp       The Troponin-I (POC) results cannot be compared to the Troponin-T results.    Troponin    Collection Time: 02/09/18  1:10 PM   Result Value Ref Range    Troponin T 0.14 (HH) <0.03 ng/mL    Troponin Interp         Troponin    Collection Time: 02/09/18  3:47 PM   Result Value Ref Range    Troponin T 0.13 (HH) <0.03 ng/mL    Troponin Interp         Vitamin D 25 Hydroxy    Collection Time: 02/09/18  3:47 PM   Result Value Ref Range    Vit D, 25-Hydroxy 11.3 (L) 30.0 - 100.0 ng/mL   POC Glucose Fingerstick    Collection Time: 02/09/18 10:01 PM   Result Value Ref Range    POC Glucose 85 75 - 110 mg/dL   CBC WITH AUTO DIFFERENTIAL    Collection Time: 02/10/18  6:06 AM   Result Value Ref Range    WBC 5.4 3.5 - 11.3 k/uL    RBC 3.57 (L) 4.21 - 5.77 m/uL    Hemoglobin 10.6 (L) 13.0 - 17.0 g/dL    Hematocrit 34.2 (L) 40.7 - 50.3 %    MCV 95.8 82.6 - 102.9 fL    MCH 29.7 25.2 - 33.5 pg    MCHC 31.0 28.4 - 34.8 g/dL    RDW 14.7 (H) 11.8 - 14.4 %    Platelets 92 Head Wo Contrast    Result Date: 2/9/2018  EXAMINATION: CT OF THE HEAD WITHOUT CONTRAST  2/9/2018 1:29 pm TECHNIQUE: CT of the head was performed without the administration of intravenous contrast. Dose modulation, iterative reconstruction, and/or weight based adjustment of the mA/kV was utilized to reduce the radiation dose to as low as reasonably achievable. COMPARISON: None. HISTORY: ORDERING SYSTEM PROVIDED HISTORY: s/p fall and hit head yesterday, dialysis dependent, not on blood thinners. TECHNOLOGIST PROVIDED HISTORY: Has a \"code stroke\" or \"stroke alert\" been called? ->No FINDINGS: BRAIN/VENTRICLES: There is no acute intracranial hemorrhage, mass effect or midline shift. No abnormal extra-axial fluid collection. The gray-white differentiation is maintained without evidence of an acute infarct. There is no evidence of hydrocephalus. ORBITS: The visualized portion of the orbits demonstrate no acute abnormality. SINUSES: Persistent opacification of the right mastoid tip. SOFT TISSUES/SKULL:  Status post left temporoparietal craniotomy. No acute intracranial abnormality. Postsurgical changes from previous left temporoparietal craniotomy. .     Ct Cervical Spine Wo Contrast    Result Date: 2/9/2018  EXAMINATION: CT OF THE CERVICAL SPINE WITHOUT CONTRAST 2/9/2018 1:29 pm TECHNIQUE: CT of the cervical spine was performed without the administration of intravenous contrast. Multiplanar reformatted images are provided for review. Dose modulation, iterative reconstruction, and/or weight based adjustment of the mA/kV was utilized to reduce the radiation dose to as low as reasonably achievable. COMPARISON: None. HISTORY: ORDERING SYSTEM PROVIDED HISTORY: s/p several falls yesterday and hit head. neurovasc intact but earlier today not acting right per daughter. FINDINGS: BONES/ALIGNMENT: There is no evidence of an acute cervical spine fracture. There is normal alignment of the cervical spine.  DEGENERATIVE CHANGES: Advanced, multifocal degenerative change. SOFT TISSUES: Patient appears anemic. There is heterogenous attenuation of the thyroid gland. Mildly prominent cervical chain lymph nodes. Superior mediastinal lymph nodes appear enlarged. 1. No CT evidence of acute trauma. 2. Advanced, multifocal degenerative change. 3. Supraclavicular adenopathy suspected. Xr Shoulder Left (min 2 Views)    Result Date: 2/9/2018  EXAMINATION: 3 VIEWS OF THE LEFT SHOULDER; 2 VIEWS OF THE LEFT SCAPULA 2/9/2018 2:17 pm COMPARISON: Left shoulder radiographs dated 11/25/2016. HISTORY: ORDERING SYSTEM PROVIDED HISTORY: s/p several falls yesterday. also has \"new\" area of fluctuance over the left scapula, feels like a lipoma. TECHNOLOGIST PROVIDED HISTORY: Reason for exam:->s/p several falls yesterday. also has \"new\" area of fluctuance over the left scapula, feels like a lipoma. FINDINGS: Left shoulder: There is no acute fracture or dislocation. Subacromial space appears to be preserved. AC joint is within normal limits. Left scapula: There is no evidence of acute fracture or displacement involving the left scapula. No acute osseous abnormality in the left shoulder or scapula. Assessment :      Primary Problem  <principal problem not specified>    Active Hospital Problems    Diagnosis Date Noted    NSTEMI (non-ST elevated myocardial infarction) ([facility] [I21.4] 02/09/2018       Plan:     Patient status Admit as inpatient in the  Progressive Unit/Step down    1. Elevated trop, likely from ESRD  - trop 0.13,0.14   - trend trop  - fu echo  - cardio on board     2. ESRD, on HD TTS   - nephrology consult for HD. 3. Frequent falls, weakness   - PT/OT     4.  Seizure d/o   - check dilantin and keppra level   - denies seizure like activity      Consultations:   IP CONSULT TO HOSPITALIST  IP CONSULT TO CARDIOLOGY  IP CONSULT TO SOCIAL WORK  IP CONSULT TO NEPHROLOGY  IP CONSULT TO IV TEAM     Patient is admitted as inpatient status because of co-morbidities listed above, severity of signs and symptoms as outlined, requirement for current medical therapies and most importantly because of direct risk to patient if care not provided in a hospital setting.     Rula Chawla MD  2/10/2018  12:06 PM    Copy sent to Dr. Atul Johnston MD

## 2018-02-10 NOTE — ED NOTES
Report received from [place], [facility]. Pt resting in bed under blankets states its warmer under there, RR even and unlabored, NAD. Daughter at bedside, denied any needs at this time.       Flori Foster RN  02/09/18 [id number]

## 2018-02-10 NOTE — PROGRESS NOTES
Risk Level: Moderate    Nutrition Interventions:   Continue current diet. Encourage intake as tolerated. Send Renal oral supplements if/as needed. Continued Inpatient Monitoring, Education Not Indicated    Nutrition Evaluation:   · Evaluation: Goals set   · Goals: meet % of estimated nutrition needs     · Monitoring: Meal Intake, Supplement Intake, Diet Tolerance, Weight, Comparative Standards, Pertinent Labs    See Adult Nutrition Doc Flowsheet for more detail.      Electronically signed by Elle Vergara RD, LATONYA on 2/10/18 at 3:37 PM    Contact Number: [phone]

## 2018-02-10 NOTE — PROGRESS NOTES
Patient tolerate treatment without difficulty. Pre weight 73.8 kg and post weight 71.1 kg. Patient had liters processed of 78.4 with total fluid removed of 2430 and rinse back of 370 for net removal of 2060.

## 2018-02-10 NOTE — PLAN OF CARE
Problem: Nutrition  Goal: Optimal nutrition therapy  Outcome: Ongoing  Nutrition Problem: Inadequate oral intake  Intervention: Food and/or Nutrient Delivery: Continue current diet. Encourage intake as tolerated. Send Renal oral supplements if/as needed.    Nutritional Goals: meet % of estimated nutrition needs

## 2018-02-11 VITALS
HEIGHT: 69 IN | RESPIRATION RATE: 16 BRPM | BODY MASS INDEX: 23.22 KG/M2 | DIASTOLIC BLOOD PRESSURE: 53 MMHG | HEART RATE: 72 BPM | OXYGEN SATURATION: 100 % | TEMPERATURE: 98.1 F | WEIGHT: 156.75 LBS | SYSTOLIC BLOOD PRESSURE: 146 MMHG

## 2018-02-11 PROCEDURE — 6370000000 HC RX 637 (ALT 250 FOR IP): Performed by: FAMILY MEDICINE

## 2018-02-11 PROCEDURE — 97116 GAIT TRAINING THERAPY: CPT

## 2018-02-11 PROCEDURE — 97530 THERAPEUTIC ACTIVITIES: CPT

## 2018-02-11 PROCEDURE — 99239 HOSP IP/OBS DSCHRG MGMT >30: CPT | Performed by: FAMILY MEDICINE

## 2018-02-11 PROCEDURE — 94762 N-INVAS EAR/PLS OXIMTRY CONT: CPT

## 2018-02-11 PROCEDURE — 97110 THERAPEUTIC EXERCISES: CPT

## 2018-02-11 RX ORDER — METOPROLOL SUCCINATE 50 MG/1
50 TABLET, EXTENDED RELEASE ORAL NIGHTLY
Qty: 30 TABLET | Refills: 3 | Status: SHIPPED | OUTPATIENT
Start: 2018-02-11 | End: 2018-08-14 | Stop reason: SDUPTHER

## 2018-02-11 RX ORDER — LISINOPRIL 10 MG/1
10 TABLET ORAL DAILY
Status: DISCONTINUED | OUTPATIENT
Start: 2018-02-12 | End: 2018-02-11 | Stop reason: HOSPADM

## 2018-02-11 RX ORDER — AMLODIPINE BESYLATE 2.5 MG/1
2.5 TABLET ORAL DAILY
Status: DISCONTINUED | OUTPATIENT
Start: 2018-02-12 | End: 2018-02-11 | Stop reason: HOSPADM

## 2018-02-11 RX ORDER — METOPROLOL SUCCINATE 50 MG/1
50 TABLET, EXTENDED RELEASE ORAL NIGHTLY
Status: DISCONTINUED | OUTPATIENT
Start: 2018-02-11 | End: 2018-02-11 | Stop reason: HOSPADM

## 2018-02-11 RX ORDER — LISINOPRIL 10 MG/1
10 TABLET ORAL DAILY
Qty: 30 TABLET | Refills: 3 | Status: SHIPPED | OUTPATIENT
Start: 2018-02-12 | End: 2018-08-10 | Stop reason: SDUPTHER

## 2018-02-11 RX ADMIN — LISINOPRIL 5 MG: 5 TABLET ORAL at 08:15

## 2018-02-11 RX ADMIN — CITALOPRAM 20 MG: 20 TABLET, FILM COATED ORAL at 08:15

## 2018-02-11 RX ADMIN — CLOPIDOGREL 75 MG: 75 TABLET, FILM COATED ORAL at 08:15

## 2018-02-11 RX ADMIN — AMLODIPINE BESYLATE 5 MG: 5 TABLET ORAL at 08:17

## 2018-02-11 RX ADMIN — MEGESTROL ACETATE 400 MG: 40 SUSPENSION ORAL at 08:16

## 2018-02-11 RX ADMIN — PANTOPRAZOLE SODIUM 40 MG: 40 TABLET, DELAYED RELEASE ORAL at 08:15

## 2018-02-11 RX ADMIN — ZIPRASIDONE HYDROCHLORIDE 40 MG: 40 CAPSULE ORAL at 08:17

## 2018-02-11 RX ADMIN — FOLIC ACID 1 MG: 1 TABLET ORAL at 08:16

## 2018-02-11 RX ADMIN — EXTENDED PHENYTOIN SODIUM 100 MG: 100 CAPSULE ORAL at 08:15

## 2018-02-11 NOTE — DISCHARGE SUMMARY
to have have Echo done as OP and follow up with cardio as OP and have PT/OT at home. From chart review, patient seems to have been admitted to hospital for generalized weakness, falls and confusion in August, 2017. Patient was evaluated by neurology. MRI brain, cervical spine, EEG was done. At that time his dilantin level was low, and patient was started on keppra and was supposed to follow up with neuro as OP. However, Patient did not follow up with neuro and has not been on any seizure medications for months. Keppra resumed. Patient was advised to follow up with neurology           Significant therapeutic interventions:  1. Elevated trop, likely from ESRD  - trop 0.13,0.14, 0.16  - cardio consult  - echo ordered. Pt refused.      2. ESRD, on HD TTS   - nephrology consult for HD.      3. Frequent falls, weakness, multifactorial, including diabetic neuropathy  - PT/OT   - pt refused going to SNF  - home care for home PT/OT     4. Seizure d/o   - denies seizure like activity   - subtherapeutic keppra and dilantin level  - keppra restarted   - fu with neuro as OP      Significant Diagnostic Studies:       Radiology:    Xr Chest Standard (2 Vw)    Result Date: 2/9/2018  EXAMINATION: TWO VIEWS OF THE CHEST 2/9/2018 2:17 pm COMPARISON: 09/02/2017 HISTORY: ORDERING SYSTEM PROVIDED HISTORY: s/p fall,  left lower rib pain, dementia, on dialysis. cough non productive with some rhonchi bilateral bases TECHNOLOGIST PROVIDED HISTORY: Reason for exam:->s/p fall,  left lower rib pain, dementia, on dialysis. cough non productive with some rhonchi bilateral bases FINDINGS: The stable cardiomegaly. Sign mild atelectasis in the left lung base. No pulmonary consolidation, edema, effusion or pneumothorax. Left hemidiaphragm is slightly elevated. Osseous structures are grossly unremarkable. Diffuse dorsal spondylosis. Underlying COPD. Mild atelectatic changes in the left lung base.      Xr Scapula Left (complete)    Result Date: 2/9/2018  EXAMINATION: 3 VIEWS OF THE LEFT SHOULDER; 2 VIEWS OF THE LEFT SCAPULA 2/9/2018 2:17 pm COMPARISON: Left shoulder radiographs dated 11/25/2016. HISTORY: ORDERING SYSTEM PROVIDED HISTORY: s/p several falls yesterday. also has \"new\" area of fluctuance over the left scapula, feels like a lipoma. TECHNOLOGIST PROVIDED HISTORY: Reason for exam:->s/p several falls yesterday. also has \"new\" area of fluctuance over the left scapula, feels like a lipoma. FINDINGS: Left shoulder: There is no acute fracture or dislocation. Subacromial space appears to be preserved. AC joint is within normal limits. Left scapula: There is no evidence of acute fracture or displacement involving the left scapula. No acute osseous abnormality in the left shoulder or scapula. Xr Knee Right (3 Views)    Result Date: 2/9/2018  EXAMINATION: 3 VIEWS OF THE RIGHT KNEE 2/9/2018 2:17 pm COMPARISON: 03/19/2008 HISTORY: ORDERING SYSTEM PROVIDED HISTORY: s/p several falls yesterday, patellar and medial joint tenderness. TECHNOLOGIST PROVIDED HISTORY: Reason for exam:->s/p several falls yesterday, patellar and medial joint tenderness. FINDINGS: No evidence of acute fracture or dislocation. No focal osseous lesion. No evidence of joint effusion. No focal soft tissue abnormality. Severe vascular calcifications present. No acute abnormality of the knee. Ct Head Wo Contrast    Result Date: 2/9/2018  EXAMINATION: CT OF THE HEAD WITHOUT CONTRAST  2/9/2018 1:29 pm TECHNIQUE: CT of the head was performed without the administration of intravenous contrast. Dose modulation, iterative reconstruction, and/or weight based adjustment of the mA/kV was utilized to reduce the radiation dose to as low as reasonably achievable. COMPARISON: None. HISTORY: ORDERING SYSTEM PROVIDED HISTORY: s/p fall and hit head yesterday, dialysis dependent, not on blood thinners.  TECHNOLOGIST PROVIDED HISTORY: Has a \"code stroke\" or \"stroke alert\" been called? ->No FINDINGS: BRAIN/VENTRICLES: There is no acute intracranial hemorrhage, mass effect or midline shift. No abnormal extra-axial fluid collection. The gray-white differentiation is maintained without evidence of an acute infarct. There is no evidence of hydrocephalus. ORBITS: The visualized portion of the orbits demonstrate no acute abnormality. SINUSES: Persistent opacification of the right mastoid tip. SOFT TISSUES/SKULL:  Status post left temporoparietal craniotomy. No acute intracranial abnormality. Postsurgical changes from previous left temporoparietal craniotomy. .     Ct Cervical Spine Wo Contrast    Result Date: 2/9/2018  EXAMINATION: CT OF THE CERVICAL SPINE WITHOUT CONTRAST 2/9/2018 1:29 pm TECHNIQUE: CT of the cervical spine was performed without the administration of intravenous contrast. Multiplanar reformatted images are provided for review. Dose modulation, iterative reconstruction, and/or weight based adjustment of the mA/kV was utilized to reduce the radiation dose to as low as reasonably achievable. COMPARISON: None. HISTORY: ORDERING SYSTEM PROVIDED HISTORY: s/p several falls yesterday and hit head. neurovasc intact but earlier today not acting right per daughter. FINDINGS: BONES/ALIGNMENT: There is no evidence of an acute cervical spine fracture. There is normal alignment of the cervical spine. DEGENERATIVE CHANGES: Advanced, multifocal degenerative change. SOFT TISSUES: Patient appears anemic. There is heterogenous attenuation of the thyroid gland. Mildly prominent cervical chain lymph nodes. Superior mediastinal lymph nodes appear enlarged. 1. No CT evidence of acute trauma. 2. Advanced, multifocal degenerative change. 3. Supraclavicular adenopathy suspected.      Xr Shoulder Left (min 2 Views)    Result Date: 2/9/2018  EXAMINATION: 3 VIEWS OF THE LEFT SHOULDER; 2 VIEWS OF THE LEFT SCAPULA 2/9/2018 2:17 pm COMPARISON: Left shoulder radiographs dated

## 2018-02-11 NOTE — PROGRESS NOTES
[facility]   Progress Note                   Date:   2/11/2018  Patient name: Geno Mendieta  Date of admission:  2/9/2018 11:28 AM  MRN:   [id number]  YOB: 1950  PCP: Valentino Figueroa MD    Reason for Admission:  Multiple mechanical falls    Subjective: There were no acute events overnight, remained hemodynamically stable, denies chest pain, dyspnea, orthopnea or palpitations. Patient completed full session of dialysis yesterday. Medications:   Scheduled Meds:   diphenhydrAMINE  25 mg Intravenous Once    sodium chloride flush  10 mL Intravenous 2 times per day    amLODIPine  5 mg Oral Daily    citalopram  20 mg Oral Daily    clopidogrel  75 mg Oral Daily    folic acid  1 mg Oral Daily    ziprasidone  40 mg Oral BID WC    simvastatin  10 mg Oral Nightly    rivastigmine  1 patch Transdermal Daily    pantoprazole  40 mg Oral QAM AC    mirtazapine  45 mg Oral Nightly    metoprolol succinate  12.5 mg Oral Nightly    lisinopril  5 mg Oral Daily    megestrol  400 mg Oral Daily    phenytoin  100 mg Oral TID    sodium chloride flush  10 mL Intravenous 2 times per day    heparin (porcine)  5,000 Units Subcutaneous 3 times per day       Continuous Infusions:     CBC:   Recent Labs      02/09/18   1310  02/10/18   0606   WBC  3.6  5.4   HGB  11.9*  10.6*   PLT  See Reflexed IPF Result  92*     BMP:    Recent Labs      02/09/18   1310  02/10/18   0606   NA  138  135   K  4.0  4.3   CL  94*  93*   CO2  30  28   BUN  26*  35*   CREATININE  4.50*  5.46*   GLUCOSE  89  76     Hepatic:   Recent Labs      02/09/18   1310  02/10/18   0606   AST  24  18   ALT  10  10   BILITOT  0.46  0.36   ALKPHOS  83  72     Troponin:   Recent Labs      02/09/18   1310   TROPONINI  0.10     BNP: No results for input(s): BNP in the last 72 hours. Lipids: No results for input(s): CHOL, HDL in the last 72 hours.     Invalid input(s): LDLCALCU  INR:   Recent Labs      02/10/18   0606   INR 1.1       Objective:   Vitals: BP (!) 146/53   Pulse 72   Temp 98.1 °F (36.7 °C) (Oral)   Resp 16   Ht 5' 9\" (1.753 m)   Wt 156 lb 12 oz (71.1 kg)   SpO2 100%   BMI 23.15 kg/m²     General appearance: awake, alert, in no apparent respiratory distress   HEENT: Head: Normocephalic, no lesions, without obvious abnormality  Neck: no JVD  Lungs: clear to auscultation bilaterally  Heart: regular rate and rhythm, S1, S2 normal  Abdomen: soft, non-tender; bowel sounds normal  Extremities: No LE edema  Neurologic: Mental status: Alert, oriented. Motor and sensory not done. Assessment / Acute Cardiac Problems:   1. ESRD on hemodialysis  2. Elevated troponins   3.  hypertension       Plan of Treatment:   1.  elevated troponins likely secondary to ESRD. Not likely ischemia  2. abnormal EKGplan for 2-D echo- no further cardiac workup. Follow-Up As Outpatient     Discussed with patient and nursing. Nelly Urias MD  Resident, Cardiovascular Diseases   [facility]     Attending Cardiologist Addendum: I have reviewed and performed the history, physical, subjective, objective, assessment, and plan with the resident/fellow and agree with the note. I performed the history and physical personally. I have made changes to the note above as needed. Patient needs 2d Echo  However he is vehemently refusing to wait for it and wants to go home and understands risks. Will adjust his medications     Thank you for allowing me to participate in the care of this patient, please do not hesitate to call if you have any questions. Brianna Pichardo, , [address] [facility]  [facility]. [facility]  [phone]

## 2018-02-11 NOTE — PROGRESS NOTES
needed for Nausea  [DISCONTINUED] citalopram (CELEXA) 40 MG tablet, Take 40 mg by mouth daily  glucose blood VI test strips (TRUE METRIX BLOOD GLUCOSE TEST) strip, 1 each by In Vitro route 3 times daily As needed. Current Medications:     Scheduled Meds:    metoprolol succinate  50 mg Oral Nightly    [START ON 2018] lisinopril  10 mg Oral Daily    [START ON 2018] amLODIPine  2.5 mg Oral Daily    diphenhydrAMINE  25 mg Intravenous Once    sodium chloride flush  10 mL Intravenous 2 times per day    clopidogrel  75 mg Oral Daily    folic acid  1 mg Oral Daily    ziprasidone  40 mg Oral BID WC    simvastatin  10 mg Oral Nightly    pantoprazole  40 mg Oral QAM AC    mirtazapine  45 mg Oral Nightly    megestrol  400 mg Oral Daily    sodium chloride flush  10 mL Intravenous 2 times per day    heparin (porcine)  5,000 Units Subcutaneous 3 times per day     Continuous Infusions:    PRN Meds:  diphenhydrAMINE-zinc acetate, sodium chloride, sodium chloride, diphenhydrAMINE, sodium chloride flush, HYDROcodone 5 mg - acetaminophen **OR** HYDROcodone 5 mg - acetaminophen, ondansetron, sodium chloride flush, potassium chloride **OR** potassium chloride **OR** potassium chloride, magnesium sulfate, acetaminophen, morphine **OR** morphine, magnesium hydroxide, bisacodyl, ondansetron, nicotine    Input/Output:       I/O last 3 completed shifts: In: 370   Out: 2430 .     Patient Vitals for the past 96 hrs (Last 3 readings):   Weight   02/10/18 1820 156 lb 12 oz (71.1 kg)   02/10/18 1505 162 lb 11.2 oz (73.8 kg)   18 2154 162 lb 11.2 oz (73.8 kg)       Vital Signs:   Temperature:  Temp: 98.1 °F (36.7 °C)  TMax:   Temp (24hrs), Av.1 °F (36.7 °C), Min:97.7 °F (36.5 °C), Max:98.3 °F (36.8 °C)    Respirations:  Resp: 16  Pulse:   Pulse: 72  BP:    BP: (!) 146/53  BP Range: Systolic (54WKD), GKO:640 , Min:129 , WIM:353       Diastolic (99RGS), YA, Min:49, Max:87      Physical Examination: HEPCAB NONREACTIVE 10/25/2011       Urinalysis/Chemistries:    No results found for: [place], PHUR, LABCAST, WBCUA, RBCUA, MUCUS, TRICHOMONAS, YEAST, BACTERIA, CLARITYU, SPECGRAV, LEUKOCYTESUR, UROBILINOGEN, BILIRUBINUR, BLOODU, GLUCOSEU, KETUA, AMORPHOUS  Urine Sodium:   No results found for: JENNIFER  Urine Potassium:  No results found for: KUR  Urine Chloride:  No results found for: CLUR  Urine Osmolarity: No results found for: OSMOU  Urine Protein:   No components found for: TOTALPROTEIN, URINE   Urine Creatinine:   No results found for: LABCREA  Urine Eosinophils:  No components found for: UEOS    Radiology:     CXR:     Assessment:     1. ESRD secondary to diabetic nephrosclerosis: on Hemodialysis for 19 years. His regular HD days are Tuesday//Saturday at [address] [place] [address] Hemodialysis facility using Left AVF under Dr. Micaela Bazzi. His dry weight is not known. Admitted with 74.8 k. Multiple falls Likely from ataxia and peripheral neuropathy  3. Anemia of chronic disease  4. Secondary hyperparathyroidism  5. Hypertension  6. Hx of dementia  7. Hx of seizure disorder  8. Type 2 diabetes  9. Hx of depression    Plan:   1. Hemodialysis on Tuesday per schedule  2. Decrease Norvasc to 2.5 daily as discernible has been increased  3. Systolic pressure of 9:42353 except herbal  4. Stable for discharge  5. We'll follow if he stays    Nutrition   Please ensure that patient is on a renal diet/TF. Avoid nephrotoxic drugs/contrast exposure. We will continue to follow along with you.

## 2018-02-12 ENCOUNTER — APPOINTMENT (OUTPATIENT)
Dept: CT IMAGING | Age: 68
DRG: 640 | End: 2018-02-12
Payer: MEDICARE

## 2018-02-12 ENCOUNTER — HOSPITAL ENCOUNTER (INPATIENT)
Age: 68
LOS: 2 days | Discharge: HOME HEALTH | DRG: 640 | End: 2018-02-14
Attending: EMERGENCY MEDICINE | Admitting: INTERNAL MEDICINE
Payer: MEDICARE

## 2018-02-12 ENCOUNTER — APPOINTMENT (OUTPATIENT)
Dept: GENERAL RADIOLOGY | Age: 68
DRG: 640 | End: 2018-02-12
Payer: MEDICARE

## 2018-02-12 ENCOUNTER — CARE COORDINATION (OUTPATIENT)
Dept: CASE MANAGEMENT | Age: 68
End: 2018-02-12

## 2018-02-12 DIAGNOSIS — J96.01 ACUTE RESPIRATORY FAILURE WITH HYPOXEMIA (HCC): Primary | ICD-10-CM

## 2018-02-12 PROBLEM — R09.02 HYPOXIA: Status: ACTIVE | Noted: 2018-02-12

## 2018-02-12 LAB
ABSOLUTE EOS #: 0.1 K/UL (ref 0–0.4)
ABSOLUTE IMMATURE GRANULOCYTE: 0 K/UL (ref 0–0.3)
ABSOLUTE LYMPH #: 0.6 K/UL (ref 1–4.8)
ABSOLUTE MONO #: 0.6 K/UL (ref 0.1–0.8)
ANION GAP SERPL CALCULATED.3IONS-SCNC: 17 MMOL/L (ref 9–17)
BASOPHILS # BLD: 0 % (ref 0–2)
BASOPHILS ABSOLUTE: 0 K/UL (ref 0–0.2)
BNP INTERPRETATION: ABNORMAL
BUN BLDV-MCNC: 36 MG/DL (ref 8–23)
BUN/CREAT BLD: ABNORMAL (ref 9–20)
CALCIUM SERPL-MCNC: 10 MG/DL (ref 8.6–10.4)
CHLORIDE BLD-SCNC: 96 MMOL/L (ref 98–107)
CO2: 24 MMOL/L (ref 20–31)
CREAT SERPL-MCNC: 5.94 MG/DL (ref 0.7–1.2)
DIFFERENTIAL TYPE: ABNORMAL
DIRECT EXAM: NORMAL
EKG ATRIAL RATE: 91 BPM
EKG P AXIS: 26 DEGREES
EKG P-R INTERVAL: 168 MS
EKG Q-T INTERVAL: 348 MS
EKG QRS DURATION: 92 MS
EKG QTC CALCULATION (BAZETT): 428 MS
EKG R AXIS: -42 DEGREES
EKG T AXIS: 75 DEGREES
EKG VENTRICULAR RATE: 91 BPM
EOSINOPHILS RELATIVE PERCENT: 1 % (ref 1–4)
GFR AFRICAN AMERICAN: 11 ML/MIN
GFR NON-AFRICAN AMERICAN: 9 ML/MIN
GFR SERPL CREATININE-BSD FRML MDRD: ABNORMAL ML/MIN/{1.73_M2}
GFR SERPL CREATININE-BSD FRML MDRD: ABNORMAL ML/MIN/{1.73_M2}
GLUCOSE BLD-MCNC: 74 MG/DL (ref 70–99)
HCT VFR BLD CALC: 38.7 % (ref 40.7–50.3)
HEMOGLOBIN: 11.9 G/DL (ref 13–17)
IMMATURE GRANULOCYTES: 0 %
LYMPHOCYTES # BLD: 6 % (ref 24–44)
Lab: NORMAL
MCH RBC QN AUTO: 29.5 PG (ref 25.2–33.5)
MCHC RBC AUTO-ENTMCNC: 30.7 G/DL (ref 28.4–34.8)
MCV RBC AUTO: 96 FL (ref 82.6–102.9)
MONOCYTES # BLD: 6 % (ref 1–7)
MORPHOLOGY: NORMAL
NRBC AUTOMATED: 0 PER 100 WBC
PDW BLD-RTO: 15 % (ref 11.8–14.4)
PLATELET # BLD: 108 K/UL (ref 138–453)
PLATELET ESTIMATE: ABNORMAL
PMV BLD AUTO: 10.5 FL (ref 8.1–13.5)
POC TROPONIN I: 0.1 NG/ML (ref 0–0.1)
POC TROPONIN INTERP: NORMAL
POTASSIUM SERPL-SCNC: 3.8 MMOL/L (ref 3.7–5.3)
PRO-BNP: ABNORMAL PG/ML
RBC # BLD: 4.03 M/UL (ref 4.21–5.77)
RBC # BLD: ABNORMAL 10*6/UL
SEG NEUTROPHILS: 87 % (ref 36–66)
SEGMENTED NEUTROPHILS ABSOLUTE COUNT: 8.7 K/UL (ref 1.8–7.7)
SODIUM BLD-SCNC: 137 MMOL/L (ref 135–144)
SPECIMEN DESCRIPTION: NORMAL
STATUS: NORMAL
TROPONIN INTERP: ABNORMAL
TROPONIN T: 0.15 NG/ML
WBC # BLD: 10 K/UL (ref 3.5–11.3)
WBC # BLD: ABNORMAL 10*3/UL

## 2018-02-12 PROCEDURE — 6370000000 HC RX 637 (ALT 250 FOR IP): Performed by: STUDENT IN AN ORGANIZED HEALTH CARE EDUCATION/TRAINING PROGRAM

## 2018-02-12 PROCEDURE — 6360000002 HC RX W HCPCS: Performed by: INTERNAL MEDICINE

## 2018-02-12 PROCEDURE — 93005 ELECTROCARDIOGRAM TRACING: CPT

## 2018-02-12 PROCEDURE — 84484 ASSAY OF TROPONIN QUANT: CPT

## 2018-02-12 PROCEDURE — 1200000000 HC SEMI PRIVATE

## 2018-02-12 PROCEDURE — 94762 N-INVAS EAR/PLS OXIMTRY CONT: CPT

## 2018-02-12 PROCEDURE — 2580000003 HC RX 258: Performed by: INTERNAL MEDICINE

## 2018-02-12 PROCEDURE — 80048 BASIC METABOLIC PNL TOTAL CA: CPT

## 2018-02-12 PROCEDURE — 85025 COMPLETE CBC W/AUTO DIFF WBC: CPT

## 2018-02-12 PROCEDURE — 99285 EMERGENCY DEPT VISIT HI MDM: CPT

## 2018-02-12 PROCEDURE — 87804 INFLUENZA ASSAY W/OPTIC: CPT

## 2018-02-12 PROCEDURE — 71046 X-RAY EXAM CHEST 2 VIEWS: CPT

## 2018-02-12 PROCEDURE — 6370000000 HC RX 637 (ALT 250 FOR IP): Performed by: INTERNAL MEDICINE

## 2018-02-12 PROCEDURE — 83880 ASSAY OF NATRIURETIC PEPTIDE: CPT

## 2018-02-12 RX ORDER — BENZONATATE 100 MG/1
100 CAPSULE ORAL 3 TIMES DAILY PRN
Status: DISCONTINUED | OUTPATIENT
Start: 2018-02-12 | End: 2018-02-14 | Stop reason: HOSPADM

## 2018-02-12 RX ORDER — MAGNESIUM SULFATE 1 G/100ML
1 INJECTION INTRAVENOUS PRN
Status: DISCONTINUED | OUTPATIENT
Start: 2018-02-12 | End: 2018-02-14 | Stop reason: HOSPADM

## 2018-02-12 RX ORDER — METOPROLOL SUCCINATE 50 MG/1
50 TABLET, EXTENDED RELEASE ORAL NIGHTLY
Status: DISCONTINUED | OUTPATIENT
Start: 2018-02-12 | End: 2018-02-14 | Stop reason: HOSPADM

## 2018-02-12 RX ORDER — LEVETIRACETAM 500 MG/1
500 TABLET ORAL 2 TIMES DAILY
Status: DISCONTINUED | OUTPATIENT
Start: 2018-02-12 | End: 2018-02-14 | Stop reason: HOSPADM

## 2018-02-12 RX ORDER — MORPHINE SULFATE 2 MG/ML
4 INJECTION, SOLUTION INTRAMUSCULAR; INTRAVENOUS
Status: DISCONTINUED | OUTPATIENT
Start: 2018-02-12 | End: 2018-02-14 | Stop reason: HOSPADM

## 2018-02-12 RX ORDER — CLOPIDOGREL BISULFATE 75 MG/1
75 TABLET ORAL DAILY
Status: DISCONTINUED | OUTPATIENT
Start: 2018-02-13 | End: 2018-02-14 | Stop reason: HOSPADM

## 2018-02-12 RX ORDER — LIDOCAINE 40 MG/G
CREAM TOPICAL ONCE
Status: COMPLETED | OUTPATIENT
Start: 2018-02-12 | End: 2018-02-12

## 2018-02-12 RX ORDER — FOLIC ACID 1 MG/1
1 TABLET ORAL DAILY
Status: DISCONTINUED | OUTPATIENT
Start: 2018-02-12 | End: 2018-02-14 | Stop reason: HOSPADM

## 2018-02-12 RX ORDER — ONDANSETRON 2 MG/ML
4 INJECTION INTRAMUSCULAR; INTRAVENOUS EVERY 6 HOURS PRN
Status: DISCONTINUED | OUTPATIENT
Start: 2018-02-12 | End: 2018-02-14 | Stop reason: HOSPADM

## 2018-02-12 RX ORDER — POTASSIUM CHLORIDE 20 MEQ/1
40 TABLET, EXTENDED RELEASE ORAL PRN
Status: DISCONTINUED | OUTPATIENT
Start: 2018-02-12 | End: 2018-02-14 | Stop reason: HOSPADM

## 2018-02-12 RX ORDER — HYDROCODONE BITARTRATE AND ACETAMINOPHEN 5; 325 MG/1; MG/1
1 TABLET ORAL EVERY 4 HOURS PRN
Status: DISCONTINUED | OUTPATIENT
Start: 2018-02-12 | End: 2018-02-14 | Stop reason: HOSPADM

## 2018-02-12 RX ORDER — ACETAMINOPHEN 325 MG/1
650 TABLET ORAL EVERY 4 HOURS PRN
Status: DISCONTINUED | OUTPATIENT
Start: 2018-02-12 | End: 2018-02-14 | Stop reason: HOSPADM

## 2018-02-12 RX ORDER — RIVASTIGMINE 4.6 MG/24H
1 PATCH, EXTENDED RELEASE TRANSDERMAL DAILY
Status: DISCONTINUED | OUTPATIENT
Start: 2018-02-12 | End: 2018-02-14 | Stop reason: HOSPADM

## 2018-02-12 RX ORDER — AMLODIPINE BESYLATE 5 MG/1
5 TABLET ORAL DAILY
Status: DISCONTINUED | OUTPATIENT
Start: 2018-02-12 | End: 2018-02-14 | Stop reason: HOSPADM

## 2018-02-12 RX ORDER — LISINOPRIL 10 MG/1
10 TABLET ORAL DAILY
Status: DISCONTINUED | OUTPATIENT
Start: 2018-02-13 | End: 2018-02-14 | Stop reason: HOSPADM

## 2018-02-12 RX ORDER — POTASSIUM CHLORIDE 20MEQ/15ML
40 LIQUID (ML) ORAL PRN
Status: DISCONTINUED | OUTPATIENT
Start: 2018-02-12 | End: 2018-02-14 | Stop reason: HOSPADM

## 2018-02-12 RX ORDER — HEPARIN SODIUM 5000 [USP'U]/ML
5000 INJECTION, SOLUTION INTRAVENOUS; SUBCUTANEOUS EVERY 8 HOURS SCHEDULED
Status: DISCONTINUED | OUTPATIENT
Start: 2018-02-12 | End: 2018-02-14 | Stop reason: HOSPADM

## 2018-02-12 RX ORDER — POTASSIUM CHLORIDE 7.45 MG/ML
10 INJECTION INTRAVENOUS PRN
Status: DISCONTINUED | OUTPATIENT
Start: 2018-02-12 | End: 2018-02-14 | Stop reason: HOSPADM

## 2018-02-12 RX ORDER — SODIUM CHLORIDE 0.9 % (FLUSH) 0.9 %
10 SYRINGE (ML) INJECTION PRN
Status: DISCONTINUED | OUTPATIENT
Start: 2018-02-12 | End: 2018-02-14 | Stop reason: HOSPADM

## 2018-02-12 RX ORDER — PANTOPRAZOLE SODIUM 40 MG/1
40 TABLET, DELAYED RELEASE ORAL
Status: DISCONTINUED | OUTPATIENT
Start: 2018-02-13 | End: 2018-02-14 | Stop reason: HOSPADM

## 2018-02-12 RX ORDER — MIRTAZAPINE 15 MG/1
45 TABLET, FILM COATED ORAL NIGHTLY
Status: DISCONTINUED | OUTPATIENT
Start: 2018-02-12 | End: 2018-02-14 | Stop reason: HOSPADM

## 2018-02-12 RX ORDER — NICOTINE 21 MG/24HR
1 PATCH, TRANSDERMAL 24 HOURS TRANSDERMAL DAILY PRN
Status: DISCONTINUED | OUTPATIENT
Start: 2018-02-12 | End: 2018-02-14 | Stop reason: HOSPADM

## 2018-02-12 RX ORDER — MORPHINE SULFATE 2 MG/ML
2 INJECTION, SOLUTION INTRAMUSCULAR; INTRAVENOUS
Status: DISCONTINUED | OUTPATIENT
Start: 2018-02-12 | End: 2018-02-14 | Stop reason: HOSPADM

## 2018-02-12 RX ORDER — ZIPRASIDONE HYDROCHLORIDE 40 MG/1
40 CAPSULE ORAL 2 TIMES DAILY WITH MEALS
Status: DISCONTINUED | OUTPATIENT
Start: 2018-02-12 | End: 2018-02-14 | Stop reason: HOSPADM

## 2018-02-12 RX ORDER — BISACODYL 10 MG
10 SUPPOSITORY, RECTAL RECTAL DAILY PRN
Status: DISCONTINUED | OUTPATIENT
Start: 2018-02-12 | End: 2018-02-14 | Stop reason: HOSPADM

## 2018-02-12 RX ORDER — SIMVASTATIN 10 MG
10 TABLET ORAL NIGHTLY
Status: DISCONTINUED | OUTPATIENT
Start: 2018-02-12 | End: 2018-02-14 | Stop reason: HOSPADM

## 2018-02-12 RX ORDER — SODIUM CHLORIDE 0.9 % (FLUSH) 0.9 %
10 SYRINGE (ML) INJECTION EVERY 12 HOURS SCHEDULED
Status: DISCONTINUED | OUTPATIENT
Start: 2018-02-12 | End: 2018-02-14 | Stop reason: HOSPADM

## 2018-02-12 RX ADMIN — METOPROLOL SUCCINATE 50 MG: 50 TABLET, FILM COATED, EXTENDED RELEASE ORAL at 22:08

## 2018-02-12 RX ADMIN — LEVETIRACETAM 500 MG: 500 TABLET, FILM COATED ORAL at 22:09

## 2018-02-12 RX ADMIN — Medication 10 ML: at 22:25

## 2018-02-12 RX ADMIN — MIRTAZAPINE 45 MG: 15 TABLET, FILM COATED ORAL at 22:07

## 2018-02-12 RX ADMIN — HEPARIN SODIUM 5000 UNITS: 5000 INJECTION, SOLUTION INTRAVENOUS; SUBCUTANEOUS at 22:11

## 2018-02-12 RX ADMIN — SIMVASTATIN 10 MG: 10 TABLET, FILM COATED ORAL at 22:09

## 2018-02-12 RX ADMIN — ZIPRASIDONE HYDROCHLORIDE 40 MG: 40 CAPSULE ORAL at 22:09

## 2018-02-12 RX ADMIN — BENZONATATE 100 MG: 100 CAPSULE ORAL at 22:07

## 2018-02-12 RX ADMIN — LIDOCAINE: 40 CREAM TOPICAL at 14:10

## 2018-02-12 ASSESSMENT — ENCOUNTER SYMPTOMS
ABDOMINAL PAIN: 0
BACK PAIN: 0
SHORTNESS OF BREATH: 0
COUGH: 0
VOMITING: 0

## 2018-02-12 NOTE — ED NOTES
Dr. Cheri Tse at bedside. Pt resting on cart with call light within reach. NAD noted, RR even and NL.  Will continue to monitor       Justus Mares RN  02/12/18 9001

## 2018-02-12 NOTE — ED NOTES
Pt agreeable to IV.  LMX applied to right AC and right hand at [address] [place] [address], [address] [place] [address]  02/12/18 7503

## 2018-02-12 NOTE — ED NOTES
Pt resting on cart with call light within reach. NAD noted, RR even and NL.  Will continue to monitor     Dean Dalton RN  02/12/18 2139

## 2018-02-12 NOTE — ED NOTES
Pt resting on cart with call light within reach. NAD noted, RR even and NL.  Will continue to monitor     Miryam Horn RN  02/12/18 0123

## 2018-02-12 NOTE — ED PROVIDER NOTES
reviewed. DIFFERENTIAL  DIAGNOSIS     PLAN (LABS / IMAGING / EKG):  Orders Placed This Encounter   Procedures    RAPID INFLUENZA A/B ANTIGENS    XR CHEST STANDARD (2 VW)    CBC Auto Differential    Basic Metabolic Panel    Brain Natriuretic Peptide    Troponin    TROP/MYOGLOBIN    Basic Metabolic Panel w/ Reflex to MG    CBC    Protime-INR    DIET RENAL; Carb Control: 3 carbs/meal (approximate 1500 kcals/day); Low Sodium (2 GM)    Continuous Pulse Oximetry    Telemetry monitoring    Vital signs per unit routine    Notify physician    Up with assistance    Daily weights    Intake and output    Tobacco cessation education protocol    Place intermittent pneumatic compression device    Elevate heels off of bed at all times if patient is not able to move lower extremities    Turn or assist with turn every 2 hours if patient is unable to turn self. Remind patient to turn if necessary.     Inspect skin per unit guidelines    Maintain HOB at the lowest elevation consistent with medical plan of care    Full Code    Inpatient consult to Nephrology    Inpatient consult to Hospitalist    Inpatient consult to Cardiology    Inpatient consult to Nephrology    Inpatient consult to Palliative Care    Respiratory care evaluation only    Initiate Oxygen Therapy Protocol    Pulse Oximetry Spot Check    POCT troponin    POCT troponin    EKG 12 Lead    Insert peripheral IV    PATIENT STATUS (FROM ED OR OR/PROCEDURAL) Inpatient       MEDICATIONS ORDERED:  Orders Placed This Encounter   Medications    lidocaine (LMX) 4 % cream    amLODIPine (NORVASC) tablet 5 mg    clopidogrel (PLAVIX) tablet 75 mg    folic acid (FOLVITE) tablet 1 mg    levETIRAcetam (KEPPRA) tablet 500 mg    lisinopril (PRINIVIL;ZESTRIL) tablet 10 mg    metoprolol succinate (TOPROL XL) extended release tablet 50 mg    mirtazapine (REMERON) tablet 45 mg    pantoprazole (PROTONIX) tablet 40 mg    simvastatin (ZOCOR) R Axis -42 degrees    T Axis 75 degrees       RADIOLOGY:  No results found. EKG    Rate: regular  Rhythm: sinus  Axis: normal   Conduction: PVCs noted  ST: no acute changes  T waves: inversion V2  Impression: Nonspecific EKG  All EKG's are interpreted by the Emergency Department Physician who either signs or Co-signs this chart in the absence of a cardiologist.    [facility]/EMERGENCY DEPARTMENT COURSE:  1518 PM; 69year old male presenting at the direction of [facility] nurse for low oxygen sats. Patient requiring 2 L O2 at present for normal oxygen sats. Diffuse rhonchi present, but normal air entry. Will obtain cardiac workup, attempt CT PE. Plan for admission. ED Course as of Feb 12 2017   Mon Feb 12, 2018   1238 Patient is refusing any lab draws at this time. Patient states he does not want to be poked. Several nurses have come into the room to attempt, and he is adamantly refusing. I discussed with him the importance of obtaining lab work given his condition, and he is still refusing. I offered him as well as ultrasound guided IV placement, and he is refusing at this time. He is agreeable to an x-ray.  [AF]   1333 CXR with evidence of mild vascular congestion. Patient continues to refuse any lab draws. Satting 100% on 2L. Took O2 off and satting 96-97%-- will monitor off O2 and reassess. [AF]   1410 Patient now agreeable to lab draw if we put LMX cream on prior to poke. Will place LMX cream and attempt lab draw. Will need admission once labs are returned. [AF]   1534 Labs drawn. Discussed with attending physician and will obtain CT PE to r/o PE given persistent hypoxemia and no evidence of pna on exam/cxr. D/w Dr. Gene Garcia of nephro who will evaluate patient for potential need for dialysis after CT imaging with contrast.  [AF]   66 91 21 Paged to CT scan by radiology tech. Patient refusing CT scan.  States doesn't want any more imaging done.   [AF]   1600 Talked to Dr. Marisa Benjamin of [facility] about case who

## 2018-02-13 ENCOUNTER — APPOINTMENT (OUTPATIENT)
Dept: DIALYSIS | Age: 68
DRG: 640 | End: 2018-02-13
Payer: MEDICARE

## 2018-02-13 LAB
ANION GAP SERPL CALCULATED.3IONS-SCNC: 18 MMOL/L (ref 9–17)
BUN BLDV-MCNC: 48 MG/DL (ref 8–23)
BUN/CREAT BLD: ABNORMAL (ref 9–20)
CALCIUM SERPL-MCNC: 9.6 MG/DL (ref 8.6–10.4)
CHLORIDE BLD-SCNC: 95 MMOL/L (ref 98–107)
CO2: 22 MMOL/L (ref 20–31)
CREAT SERPL-MCNC: 6.82 MG/DL (ref 0.7–1.2)
EKG ATRIAL RATE: 95 BPM
EKG P AXIS: 43 DEGREES
EKG P-R INTERVAL: 190 MS
EKG Q-T INTERVAL: 340 MS
EKG QRS DURATION: 90 MS
EKG QTC CALCULATION (BAZETT): 427 MS
EKG R AXIS: -42 DEGREES
EKG T AXIS: 78 DEGREES
EKG VENTRICULAR RATE: 95 BPM
GFR AFRICAN AMERICAN: 10 ML/MIN
GFR NON-AFRICAN AMERICAN: 8 ML/MIN
GFR SERPL CREATININE-BSD FRML MDRD: ABNORMAL ML/MIN/{1.73_M2}
GFR SERPL CREATININE-BSD FRML MDRD: ABNORMAL ML/MIN/{1.73_M2}
GLUCOSE BLD-MCNC: 83 MG/DL (ref 70–99)
HCT VFR BLD CALC: 32.5 % (ref 40.7–50.3)
HEMOGLOBIN: 10.1 G/DL (ref 13–17)
INR BLD: 1.2
MCH RBC QN AUTO: 30.1 PG (ref 25.2–33.5)
MCHC RBC AUTO-ENTMCNC: 31.1 G/DL (ref 28.4–34.8)
MCV RBC AUTO: 97 FL (ref 82.6–102.9)
MYOGLOBIN: 319 NG/ML (ref 28–72)
NRBC AUTOMATED: 0 PER 100 WBC
PDW BLD-RTO: 15.1 % (ref 11.8–14.4)
PLATELET # BLD: ABNORMAL K/UL (ref 138–453)
PLATELET, FLUORESCENCE: 95 K/UL (ref 138–453)
PLATELET, IMMATURE FRACTION: 2.6 % (ref 1.1–10.3)
PMV BLD AUTO: ABNORMAL FL (ref 8.1–13.5)
POTASSIUM SERPL-SCNC: 4.1 MMOL/L (ref 3.7–5.3)
PROTHROMBIN TIME: 12.4 SEC (ref 9–12)
RBC # BLD: 3.35 M/UL (ref 4.21–5.77)
SODIUM BLD-SCNC: 135 MMOL/L (ref 135–144)
TROPONIN INTERP: ABNORMAL
TROPONIN T: 0.14 NG/ML
WBC # BLD: 8.6 K/UL (ref 3.5–11.3)

## 2018-02-13 PROCEDURE — 80048 BASIC METABOLIC PNL TOTAL CA: CPT

## 2018-02-13 PROCEDURE — 85610 PROTHROMBIN TIME: CPT

## 2018-02-13 PROCEDURE — 1200000000 HC SEMI PRIVATE

## 2018-02-13 PROCEDURE — 85055 RETICULATED PLATELET ASSAY: CPT

## 2018-02-13 PROCEDURE — 83874 ASSAY OF MYOGLOBIN: CPT

## 2018-02-13 PROCEDURE — 6370000000 HC RX 637 (ALT 250 FOR IP): Performed by: INTERNAL MEDICINE

## 2018-02-13 PROCEDURE — 99221 1ST HOSP IP/OBS SF/LOW 40: CPT | Performed by: FAMILY MEDICINE

## 2018-02-13 PROCEDURE — 6370000000 HC RX 637 (ALT 250 FOR IP): Performed by: NURSE PRACTITIONER

## 2018-02-13 PROCEDURE — 85027 COMPLETE CBC AUTOMATED: CPT

## 2018-02-13 PROCEDURE — 5A1D70Z PERFORMANCE OF URINARY FILTRATION, INTERMITTENT, LESS THAN 6 HOURS PER DAY: ICD-10-PCS | Performed by: INTERNAL MEDICINE

## 2018-02-13 PROCEDURE — 84484 ASSAY OF TROPONIN QUANT: CPT

## 2018-02-13 PROCEDURE — 90937 HEMODIALYSIS REPEATED EVAL: CPT

## 2018-02-13 PROCEDURE — 6360000002 HC RX W HCPCS: Performed by: INTERNAL MEDICINE

## 2018-02-13 PROCEDURE — 99222 1ST HOSP IP/OBS MODERATE 55: CPT | Performed by: INTERNAL MEDICINE

## 2018-02-13 PROCEDURE — 2580000003 HC RX 258: Performed by: INTERNAL MEDICINE

## 2018-02-13 RX ORDER — DEXTROMETHORPHAN POLISTIREX 30 MG/5ML
30 SUSPENSION ORAL EVERY 12 HOURS SCHEDULED
Status: DISCONTINUED | OUTPATIENT
Start: 2018-02-13 | End: 2018-02-14 | Stop reason: HOSPADM

## 2018-02-13 RX ORDER — GUAIFENESIN 600 MG/1
600 TABLET, EXTENDED RELEASE ORAL 2 TIMES DAILY
Status: DISCONTINUED | OUTPATIENT
Start: 2018-02-13 | End: 2018-02-14 | Stop reason: HOSPADM

## 2018-02-13 RX ORDER — CLOPIDOGREL BISULFATE 75 MG/1
TABLET ORAL
Status: DISPENSED
Start: 2018-02-13 | End: 2018-02-14

## 2018-02-13 RX ADMIN — LEVETIRACETAM 500 MG: 500 TABLET, FILM COATED ORAL at 16:42

## 2018-02-13 RX ADMIN — SIMVASTATIN 10 MG: 10 TABLET, FILM COATED ORAL at 22:30

## 2018-02-13 RX ADMIN — HEPARIN SODIUM 5000 UNITS: 5000 INJECTION, SOLUTION INTRAVENOUS; SUBCUTANEOUS at 15:00

## 2018-02-13 RX ADMIN — ZIPRASIDONE HYDROCHLORIDE 40 MG: 40 CAPSULE ORAL at 16:41

## 2018-02-13 RX ADMIN — Medication 30 MG: at 22:32

## 2018-02-13 RX ADMIN — HEPARIN SODIUM 5000 UNITS: 5000 INJECTION, SOLUTION INTRAVENOUS; SUBCUTANEOUS at 07:03

## 2018-02-13 RX ADMIN — HEPARIN SODIUM 5000 UNITS: 5000 INJECTION, SOLUTION INTRAVENOUS; SUBCUTANEOUS at 22:34

## 2018-02-13 RX ADMIN — AMLODIPINE BESYLATE 5 MG: 5 TABLET ORAL at 16:41

## 2018-02-13 RX ADMIN — MIRTAZAPINE 45 MG: 15 TABLET, FILM COATED ORAL at 22:30

## 2018-02-13 RX ADMIN — PANTOPRAZOLE SODIUM 40 MG: 40 TABLET, DELAYED RELEASE ORAL at 16:42

## 2018-02-13 RX ADMIN — CLOPIDOGREL 75 MG: 75 TABLET, FILM COATED ORAL at 16:51

## 2018-02-13 RX ADMIN — FOLIC ACID 1 MG: 1 TABLET ORAL at 16:41

## 2018-02-13 RX ADMIN — GUAIFENESIN 600 MG: 600 TABLET, EXTENDED RELEASE ORAL at 22:30

## 2018-02-13 RX ADMIN — LEVETIRACETAM 500 MG: 500 TABLET, FILM COATED ORAL at 22:30

## 2018-02-13 RX ADMIN — Medication 10 ML: at 22:33

## 2018-02-13 RX ADMIN — LISINOPRIL 10 MG: 10 TABLET ORAL at 16:41

## 2018-02-13 ASSESSMENT — PAIN SCALES - GENERAL
PAINLEVEL_OUTOF10: 0
PAINLEVEL_OUTOF10: 0

## 2018-02-13 NOTE — CONSULTS
medications    Medication Sig Start Date End Date Taking? Authorizing Provider   NONFORMULARY Place 4.6 mg/day onto the skin daily Rivastigmine transdermal patch   Yes Historical Provider, MD   lisinopril (PRINIVIL;ZESTRIL) 10 MG tablet Take 1 tablet by mouth daily 2/12/18  Yes Deborah Valentin MD   metoprolol succinate (TOPROL XL) 50 MG extended release tablet Take 1 tablet by mouth nightly 2/11/18  Yes Deborah Valentin MD   omeprazole (PRILOSEC) 20 MG delayed release capsule TAKE 1 CAPSULE DAILY 2/9/18  Yes Robi Felipe MD   folic acid (FOLVITE) 1 MG tablet TAKE 1 TABLET DAILY 1/18/18  Yes Ruby Fisher MD   acetaminophen (TYLENOL) 325 MG tablet Take 2 tablets by mouth every 6 hours as needed for Pain or Fever 9/6/17  Yes Shiela De Dios MD   mirtazapine (REMERON) 45 MG tablet Take 1 tablet by mouth nightly 9/6/17  Yes Shiela De Dios MD   ziprasidone (GEODON) 40 MG capsule Take 1 capsule by mouth 2 times daily (with meals) 9/6/17  Yes Shiela De Dios MD   amLODIPine (NORVASC) 5 MG tablet Take 1 tablet by mouth daily 9/7/17  Yes Shiela De Dios MD   clopidogrel (PLAVIX) 75 MG tablet TAKE 1 TABLET DAILY 8/10/17  Yes Ruby Fisher MD   simvastatin (ZOCOR) 10 MG tablet TAKE 1 TABLET AT BEDTIME 8/10/17  Yes Ruby Fisher MD   glucose blood VI test strips (TRUE METRIX BLOOD GLUCOSE TEST) strip 1 each by In Vitro route 3 times daily As needed.  11/16/16  Yes Robi Felipe MD   levETIRAcetam (KEPPRA) 500 MG tablet Take 1 tablet by mouth 2 times daily 8/4/17 9/3/17  Janie Blanca MD      Current Facility-Administered Medications: benzocaine-menthol (CEPACOL SORE THROAT) lozenge 1 lozenge, 1 lozenge, Oral, Q2H PRN  dextromethorphan (DELSYM) 30 MG/5ML extended release liquid 30 mg, 30 mg, Oral, 2 times per day  amLODIPine (NORVASC) tablet 5 mg, 5 mg, Oral, Daily  clopidogrel (PLAVIX) tablet 75 mg, 75 mg, Oral, Daily  folic acid (FOLVITE) tablet 1 mg, 1 mg, Oral, Daily  levETIRAcetam displaced  · Heart tones are crisp and normal. regular S1 and S2.  · Jugular venous pulsation Normal  · The carotid upstroke is normal in amplitude and contour without delay or bruit  · Peripheral pulses are symmetrical and full   Abdomen:   · No masses or tenderness  · Bowel sounds present  Extremities:  ·  No Cyanosis or Clubbing  ·  Lower extremity edema: No  ·  Skin: Warm and dry  Neurological:  · Alert and oriented. · Moves all extremities well  · No abnormalities of mood, affect, memory, mentation, or behavior are noted    DATA:    Diagnostics:      EKG:   Sinus rhythm with PVC, unchanged from previous tracings. ECHO:   ECHO 7/17/15: EF 65%, normal valvular function. Labs:     CBC:   Recent Labs      02/12/18   1458  02/13/18   0822   WBC  10.0  8.6   HGB  11.9*  10.1*   HCT  38.7*  32.5*   PLT  108*  See Reflexed IPF Result     BMP:   Recent Labs      02/12/18   1458  02/13/18   0822   NA  137  135   K  3.8  4.1   CO2  24  22   BUN  36*  48*   CREATININE  5.94*  6.82*   LABGLOM  9*  8*   GLUCOSE  74  83     BNP: No results for input(s): BNP in the last 72 hours. PT/INR:   Recent Labs      02/13/18   0822   PROTIME  12.4*   INR  1.2     APTT:No results for input(s): APTT in the last 72 hours. CARDIAC ENZYMES:  Recent Labs      02/12/18   1452   TROPONINI  0.10     FASTING LIPID PANEL:  Lab Results   Component Value Date    HDL 49 02/14/2017    LDLCALC 37 02/14/2017    TRIG 96 02/14/2017     LIVER PROFILE:No results for input(s): AST, ALT, LABALBU in the last 72 hours. IMPRESSION:    1. Elevated Troponin- has remains stable with no rise. Pt has ESRD, on HD. Unlikely ischemic process. 2. ESRD  3. HTN  4. DM 2        RECOMMENDATIONS:  1. Plan for 2D echo  2. HTN management  3. No further cardiac work up at this time. ** The above plan was made in conjunction with the rounding attending, Dr. Jasmin Wilson, at the bedside and was discussed with the patient.          Herman Velazquez MD  Cardiology

## 2018-02-13 NOTE — PROGRESS NOTES
Predicted   FVC   IS volume   IBW 70.7 kg    RR 24  Breath Sounds: crackles      · Bronchodilator assessment at level  1  · Hyperinflation assessment at level   · Secretion Management assessment at level    ·   · [x]    Bronchodilator Assessment  BRONCHODILATOR ASSESSMENT SCORE  Score 0 1 2 3 4 5   Breath Sounds   []  Patient Baseline [x]  No Wheeze good aeration []  Faint, scattered wheezing, good aeration []  Expiratory Wheezing and or moderately diminished []  Insp/Exp wheeze and/or very diminished []  Insp/Exp and/ or marked distress   Respiratory Rate   []  Patient Baseline []  Less than 20 []  Less than 20 [x]  20-25 []  Greater than 25 []  Greater than 25   Peak flow % of Pred or PB [x]  NA   []  Greater than 90%  []  81-90% []  71-80% []  Less than or equal to 70%  or unable to perform []  Unable due to Respiratory Distress   Dyspnea re []  Patient Baseline [x]  No SOB [x]  No SOB []  SOB on exertion []  SOB min activity []  At rest/acute   e FEV% Predicted       []  NA []  Above 69%  []  Unable []  Above 60-69%  []  Unable []  Above 50-59%  []  Unable []  Above 35-49%  []  Unable []  Less than 35%  []  Unable                 []  Hyperinflation Assessment  Score 1 2 3   CXR and Breath Sounds   []  Clear []  No atelectasis  Basilar aeration []  Atelectasis or absent basilar breath sounds   Incentive Spirometry Volume  (Per IBW)   []  Greater than or equal to 15ml/Kg []  less than 15ml/Kg []  less than 15ml/Kg   Surgery within last 2 weeks []  None or general   []  Abdominal or thoracic surgery  []  Abdominal or thoracic   Chronic Pulmonary Historyre []  No []  Yes []  Yes     []  Secretion Management Assessment  Score 1 2 3   Bilateral Breath Sounds   []  Occasional Rhonchi []  Scattered Rhonchi []  Course Rhonchi and/or poor aeration   Sputum    []  Small amount of thin secretions []  Moderate amount of viscous secretions []  Copius, Viscious Yellow/ Secretions   CXR as reported by physician

## 2018-02-13 NOTE — FLOWSHEET NOTE
Visit:  Referral from palliative physician Dr. Vince Madera for advance directives. Patient is sitting up in bed and daughter Chase Swanson is present in room. Interaction:   asked patient about advance directives;  explained documents and patient said he wanted his daughter Chase Swanson to be his primary MPOA and daughter Thai Chamberlain to be his alternate agent.  and Chase Swanson completed forms. After securing another witness patient signed documents.  made a copy and placed it in patient's chart. Asked patient and daughter if there was anything else they needed; daughter inquired about ashes for [facility] Wednesday and  said people will bring ashes around as well as there being services offered for staff and family members of patients.  assisted with TV volume. Plan: To remain available for spiritual or emotional support for patient or family as needed while patient is hospitalized. Chaplains can be paged via [employer] or called at [phone].       02/13/18 1523   Encounter Summary   Services provided to: Patient and family together   Referral/Consult From: Physician   Support System Children;Family members   Continue Visiting (2/13)   Complexity of Encounter Moderate   Length of Encounter 30 minutes   Routine   Type Follow up   Assessment Calm; Approachable   Intervention Active listening   Outcome Engaged in conversation   Advance Directives (For Healthcare)   Healthcare Directive Yes, patient has an advance directive for healthcare treatment   Copy in Chart Yes, copy in chart   Advance Directives Documents explained;Documents given; Healthcare power of attornery completed   Healthcare Agent Appointed Adult [address] Agent's Name Cruz Kraft, daughter   Healthcare Agent's Phone Number [phone]

## 2018-02-13 NOTE — PROGRESS NOTES
Nutrition Assessment    Type and Reason for Visit: Initial, Positive Nutrition Screen    Nutrition Recommendations: Recommend liberalized diet: Low K, No Added Salt diet with 5 carbs per tray. Recommend Nepro supplements daily. Malnutrition Assessment:  · Malnutrition Status: Insufficient data  · Context: Chronic illness  · Findings of the 6 clinical characteristics of malnutrition (Minimum of 2 out of 6 clinical characteristics is required to make the diagnosis of moderate or severe Protein Calorie Malnutrition based on AND/ASPEN Guidelines):  1. Energy Intake-Not available,      2. Weight Loss-7.5% loss or greater, in 6 months    Nutrition Diagnosis:   · Problem: Unintended weight loss  · Etiology: related to  (inadequate po intake)     Signs and symptoms:  as evidenced by Diet history of poor intake    Nutrition Assessment:  · Subjective Assessment: Pt referred by nursing due to wt loss and poor appetite. Pt was off the floor for HD. Reivew of old records shows 15# of wt loss over the past6 months  · Current Nutrition Therapies:  · Oral Diet Orders: Renal, Carb Control 3 Carbs/Meal, 2gm Sodium   · Oral Diet intake: Unable to assess  · Anthropometric Measures:  · Ht: 5' 9\" (175.3 cm)   · Current Body Wt: 155 lb (70.3 kg)  · Usual Body Wt: 173 lb (78.5 kg)  · % Weight Change: 9%,  6 months  · Ideal Body Wt: 160 lb (72.6 kg), % Ideal Body 97%  · BMI Classification: BMI 18.5 - 24.9 Normal Weight  · Comparative Standards (Estimated Nutrition Needs):  · Estimated Daily Total Kcal: 32-35 kcal/kg= 4110-4362 kcal  · Estimated Daily Protein (g):  g    Estimated Intake vs Estimated Needs: Insufficient Data    Nutrition Risk Level: Moderate    Nutrition Interventions:   Modify current diet, Start ONS       Nutrition Evaluation:   · Evaluation: Goals set   · Goals: Intake greater than 50%    · Monitoring: Meal Intake, Supplement Intake, Pertinent Labs    See Adult Nutrition Doc Flowsheet for more detail.

## 2018-02-13 NOTE — CONSULTS
2 times daily 60 tablet 0       Data         /60   Pulse 58   Temp 98.1 °F (36.7 °C)   Resp 18   Ht 5' 9\" (1.753 m)   Wt 155 lb 10.3 oz (70.6 kg)   SpO2 100%   BMI 22.98 kg/m²     Wt Readings from Last 3 Encounters:   02/13/18 155 lb 10.3 oz (70.6 kg)   02/10/18 156 lb 12 oz (71.1 kg)   11/15/17 169 lb 5 oz (76.8 kg)        Code Status: Full Code     ADVANCED CARE PLANNING:  Patient has capacity for medical decisions: yes  Health Care Power of : no  Living Will: no     Personal, Social, and Family History  Marital Status:   Living situation:with family:  daughter  Importance of kyra/Buddhism/spiritual beliefs: ? Very ? XSomewhat ? Not   Psychological Distress: mild  Does patient understand diagnosis/treatment? yes  Does caregiver understand diagnosis/treatment?  yes      Assessment        REVIEW OF SYSTEMS  CONSTITUTIONAL:  positive for  fevers, fatigue and malaise, negative for  chills and sweats  EYES:  negative for  double vision, blurred vision, dry eyes, blind spots and eye discharge  HEENT:  negative for  hearing loss, earaches, nasal congestion, epistaxis and sore mouth  RESPIRATORY:  positive for  Dyspnea, negative for  cough with sputum, wheezing and hemoptysis  CARDIOVASCULAR:  positive for  dyspnea, fatigue, Edema, negative for  chest pain, palpitations, orthopnea, PND  GASTROINTESTINAL:  negative for nausea, vomiting, change in bowel habits, diarrhea and abdominal pain  GENITOURINARY:  negative for frequency, dysuria, nocturia and urinary incontinence  INTEGUMENT/BREAST:  negative for rash, skin lesion(s), dryness, skin color change   NEUROLOGICAL:  negative for headaches, memory problems, speech problems and visual disturbance    PHYSICAL ASSESSMENT:  General appearance - alert, well appearing, and in no distress and oriented to person, place, and time  Mental status - alert, oriented to person, place, and time  Eyes - pupils equal and reactive, extraocular eye movements intact  Ears -  external ear canals normal  Nose - normal and patent, no erythema, discharge or polyps  Mouth - mucous membranes moist, pharynx normal without lesions  Neck - supple, no significant adenopathy  Chest - clear to auscultation, no wheezes, rales or rhonchi, symmetric air entry  Heart - normal rate, regular rhythm, normal S1, S2, no murmurs, rubs, clicks or gallops  Abdomen - soft, nontender, nondistended, no masses or organomegaly  Neurological - alert, oriented, normal speech, no focal findings or movement disorder noted  Extremities - peripheral pulses normal, + pedal edema, no clubbing or cyanosis  Skin - normal coloration and turgor, no rashes, no suspicious skin lesions noted      Palliative Performance Scale:  ___60%  Ambulation reduced; Significant disease; Can't do hobbies/housework; intake normal or reduced; occasional assist; LOC full/confusion  __X_50%  Mainly sit/lie; Extensive disease; Can't do any work; Considerable assist; intake normal or reduced; LOC full/confusion  ___40%  Mainly in bed; Extensive disease; Mainly assist; intake normal or reduced; LOC full/confusion   ___30%  Bed Bound; Extensive disease; Total care; intake reduced; LOCfull/confusion  ___20%  Bed Bound; Extensive disease; Total care; intake minimal; Drowsy/coma  ___10%  Bed Bound; Extensive disease;  Total care; Mouth care only; Drowsy/coma  ___0       Death      Plan      Palliative Interaction:    - The patient was seen along with the medical student Jody Hackett    - Patient's daughter Ana Cristina Pierre was also present in the patient's room    - Patient's current medical conditions were discussed in detail with the patient and his daughter    - Patient's daughter Ana Cristina Pierre told that the patient does not have POA paperwork    - I discussed the importance of POA paperwork for health with the patient and he told that he wanted his daughter Ana Cristina Pierre to be his POA for health    - I explained to the patient that the spiritual care team can help family/caregiver   Goals of care discussed with:    [] Patient independently    [x] Patient and Family    [] Family or Healthcare DPOA independently    [] Unable to discuss with patient, family/DPOA not present    3- Code Status  Full Code    4- Other recommendations  - We will follow-up with the POA paperwork  - We will continue to provide comfort and support to the patient and the family  Please call with any palliative questions or concerns. Palliative Care Team is available via perfect serve or via phone. Palliative Care will continue to follow Mr. Jessica Anguiano care as needed. The total time spent in seeing the patient and discussing goals of care was 55 minutes. the note has been dictated by dragon, typing errors may be a possibility. Thank you for allowing Palliative Care to participate in the care of Mr. Cb Aguilar .     Electronically signed by   Ashish Moreno MD  Palliative Care Team  on 2/13/2018 at 2:55 PM    Palliative care office: [phone]

## 2018-02-13 NOTE — H&P
 Chronic kidney disease     Contusion 9/13/2011    cheek     Depression     [facility] mellitus ([facility]     Diarrhea 2/4/2014    Frequent falls     Hyperkalemia 12/26/2013    Hyperlipidemia     Hypertension     Neuropathy (HCC)     diabetic    Obesity     Osteoarthritis     [facility] ([facility]     s/p embolization    Renal failure     Seizures ([facility]     Sprain of hip or thigh, left 9/13/2011    Unspecified cerebral artery occlusion with cerebral infarction     Wears dentures     upper and lower full        Past Surgical History:     Past Surgical History:   Procedure Laterality Date    APPENDECTOMY      CRANIOTOMY      brain bleed    DIALYSIS FISTULA CREATION Left x 2    LUE    EYE SURGERY      laser        Medications Prior to Admission:     Prior to Admission medications    Medication Sig Start Date End Date Taking?  Authorizing Provider   NONFORMULARY Place 4.6 mg/day onto the skin daily Rivastigmine transdermal patch   Yes Historical Provider, MD   lisinopril (PRINIVIL;ZESTRIL) 10 MG tablet Take 1 tablet by mouth daily 2/12/18  Yes Deforest Essex, MD   metoprolol succinate (TOPROL XL) 50 MG extended release tablet Take 1 tablet by mouth nightly 2/11/18  Yes Deforest Essex, MD   omeprazole (PRILOSEC) 20 MG delayed release capsule TAKE 1 CAPSULE DAILY 2/9/18  Yes Madeline Pearce MD   folic acid (FOLVITE) 1 MG tablet TAKE 1 TABLET DAILY 1/18/18  Yes Rafael Fernández MD   acetaminophen (TYLENOL) 325 MG tablet Take 2 tablets by mouth every 6 hours as needed for Pain or Fever 9/6/17  Yes Ventura Hartman MD   mirtazapine (REMERON) 45 MG tablet Take 1 tablet by mouth nightly 9/6/17  Yes Ventura Hartman MD   ziprasidone (GEODON) 40 MG capsule Take 1 capsule by mouth 2 times daily (with meals) 9/6/17  Yes Ventura Hartman MD   amLODIPine (NORVASC) 5 MG tablet Take 1 tablet by mouth daily 9/7/17  Yes Ventura Hartman MD   clopidogrel (PLAVIX) 75 MG tablet TAKE 1 TABLET DAILY 8/10/17  Yes León Maloney Non-compliance/Lack of cooperation/H/O Leaving AMA. Palliative care on board to help us with determination of patient's ultimate wishes and to establish the goals of his care. ESRD on HD. Nephrology following. HD today. HTN. Currently normotensive. Cont amlodipine, lisinopril, metoprolol. HLD. Cont zocor. Seizure disorder. Cont keppra. DVT PPx. SQ heparin    Consultations:   IP CONSULT TO NEPHROLOGY  IP CONSULT TO HOSPITALIST  IP CONSULT TO CARDIOLOGY  IP CONSULT TO NEPHROLOGY  IP CONSULT TO PALLIATIVE CARE    Patient is admitted as inpatient status because of co-morbidities listed above, severity of signs and symptoms as outlined, requirement for current medical therapies and most importantly because of direct risk to patient if care not provided in a hospital setting.     Annette Osborne MD  2/13/2018  11:52 AM    Copy sent to Dr. Mitchel Levine MD

## 2018-02-13 NOTE — PROGRESS NOTES
Pt ran 3hrs 15min on a 3K+  2.25Ca++  138Na+  35HCO3- bath settings with fluid removal goal of 1-1.5kg's off and pt tolerated 1.5kg's off. Pt's pre wt=72.1kg and post wt=70.6kg. Pt's Lt arm AVF cannulated with x2 #15G needles without difficulty with [email or web address] of 200ml/min and pt ran entire tx at a BFR of 450ml/min with 87.1L blood processed successfully. Pt's needles pulled one at a time using one clamp method with bleeding time less than x5min each site. Bandaids with gauze drsgs secured w/paper tape and remained clean, dry and intact upon leaving hemo unit to go back to pt's room. No s/s infection and/or no bleeding/no drng noted. Pt tolerated HD tx well.

## 2018-02-13 NOTE — PLAN OF CARE
Problem: Nutrition  Goal: Optimal nutrition therapy  Outcome: Ongoing  Nutrition Problem: Unintended weight loss  Intervention: Food and/or Nutrient Delivery: Modify current diet, Start ONS  Nutritional Goals: Intake greater than 50%

## 2018-02-14 VITALS
HEIGHT: 69 IN | OXYGEN SATURATION: 98 % | WEIGHT: 155.65 LBS | SYSTOLIC BLOOD PRESSURE: 128 MMHG | TEMPERATURE: 99.1 F | DIASTOLIC BLOOD PRESSURE: 44 MMHG | BODY MASS INDEX: 23.05 KG/M2 | HEART RATE: 68 BPM | RESPIRATION RATE: 20 BRPM

## 2018-02-14 PROCEDURE — 6370000000 HC RX 637 (ALT 250 FOR IP): Performed by: INTERNAL MEDICINE

## 2018-02-14 PROCEDURE — 6360000002 HC RX W HCPCS: Performed by: INTERNAL MEDICINE

## 2018-02-14 PROCEDURE — 6370000000 HC RX 637 (ALT 250 FOR IP): Performed by: NURSE PRACTITIONER

## 2018-02-14 PROCEDURE — 94762 N-INVAS EAR/PLS OXIMTRY CONT: CPT

## 2018-02-14 PROCEDURE — 99232 SBSQ HOSP IP/OBS MODERATE 35: CPT | Performed by: INTERNAL MEDICINE

## 2018-02-14 PROCEDURE — 2580000003 HC RX 258: Performed by: INTERNAL MEDICINE

## 2018-02-14 RX ORDER — LEVETIRACETAM 500 MG/1
500 TABLET ORAL 2 TIMES DAILY
Qty: 60 TABLET | Refills: 3 | Status: SHIPPED | OUTPATIENT
Start: 2018-02-14 | End: 2018-08-14 | Stop reason: SDUPTHER

## 2018-02-14 RX ORDER — BENZONATATE 100 MG/1
100 CAPSULE ORAL 3 TIMES DAILY PRN
Qty: 20 CAPSULE | Refills: 0 | Status: SHIPPED | OUTPATIENT
Start: 2018-02-14 | End: 2018-02-21

## 2018-02-14 RX ORDER — DEXTROMETHORPHAN POLISTIREX 30 MG/5ML
30 SUSPENSION ORAL EVERY 12 HOURS SCHEDULED
Qty: 100 ML | Refills: 0 | Status: SHIPPED | OUTPATIENT
Start: 2018-02-14 | End: 2018-02-24

## 2018-02-14 RX ADMIN — LEVETIRACETAM 500 MG: 500 TABLET, FILM COATED ORAL at 09:29

## 2018-02-14 RX ADMIN — PANTOPRAZOLE SODIUM 40 MG: 40 TABLET, DELAYED RELEASE ORAL at 09:27

## 2018-02-14 RX ADMIN — HEPARIN SODIUM 5000 UNITS: 5000 INJECTION, SOLUTION INTRAVENOUS; SUBCUTANEOUS at 07:04

## 2018-02-14 RX ADMIN — FOLIC ACID 1 MG: 1 TABLET ORAL at 09:27

## 2018-02-14 RX ADMIN — Medication 30 MG: at 09:28

## 2018-02-14 RX ADMIN — BENZONATATE 100 MG: 100 CAPSULE ORAL at 17:58

## 2018-02-14 RX ADMIN — METOPROLOL SUCCINATE 50 MG: 50 TABLET, FILM COATED, EXTENDED RELEASE ORAL at 00:08

## 2018-02-14 RX ADMIN — ZIPRASIDONE HYDROCHLORIDE 40 MG: 40 CAPSULE ORAL at 17:58

## 2018-02-14 RX ADMIN — GUAIFENESIN 600 MG: 600 TABLET, EXTENDED RELEASE ORAL at 09:29

## 2018-02-14 RX ADMIN — AMLODIPINE BESYLATE 5 MG: 5 TABLET ORAL at 09:28

## 2018-02-14 RX ADMIN — CLOPIDOGREL 75 MG: 75 TABLET, FILM COATED ORAL at 09:28

## 2018-02-14 RX ADMIN — HEPARIN SODIUM 5000 UNITS: 5000 INJECTION, SOLUTION INTRAVENOUS; SUBCUTANEOUS at 16:21

## 2018-02-14 RX ADMIN — Medication 10 ML: at 09:34

## 2018-02-14 RX ADMIN — ZIPRASIDONE HYDROCHLORIDE 40 MG: 40 CAPSULE ORAL at 09:27

## 2018-02-14 RX ADMIN — LISINOPRIL 10 MG: 10 TABLET ORAL at 09:27

## 2018-02-14 ASSESSMENT — PAIN SCALES - GENERAL
PAINLEVEL_OUTOF10: 0
PAINLEVEL_OUTOF10: 0

## 2018-02-14 NOTE — PROGRESS NOTES
(FOLVITE) tablet 1 mg Daily   levETIRAcetam (KEPPRA) tablet 500 mg BID   lisinopril (PRINIVIL;ZESTRIL) tablet 10 mg Daily   metoprolol succinate (TOPROL XL) extended release tablet 50 mg Nightly   mirtazapine (REMERON) tablet 45 mg Nightly   pantoprazole (PROTONIX) tablet 40 mg QAM AC   simvastatin (ZOCOR) tablet 10 mg Nightly   ziprasidone (GEODON) capsule 40 mg BID WC   sodium chloride flush 0.9 % injection 10 mL 2 times per day   sodium chloride flush 0.9 % injection 10 mL PRN   potassium chloride (KLOR-CON M) extended release tablet 40 mEq PRN   Or    potassium chloride 20 MEQ/15ML (10%) oral solution 40 mEq PRN   Or    potassium chloride 10 mEq/100 mL IVPB (Peripheral Line) PRN   magnesium sulfate 1 g in dextrose 5% 100 mL IVPB PRN   acetaminophen (TYLENOL) tablet 650 mg Q4H PRN   HYDROcodone-acetaminophen (NORCO) 5-325 MG per tablet 1 tablet Q4H PRN   morphine (PF) injection 2 mg Q2H PRN   Or    morphine (PF) injection 4 mg Q2H PRN   magnesium hydroxide (MILK OF MAGNESIA) 400 MG/5ML suspension 30 mL Daily PRN   bisacodyl (DULCOLAX) suppository 10 mg Daily PRN   ondansetron (ZOFRAN) injection 4 mg Q6H PRN   nicotine (NICODERM CQ) 21 MG/24HR 1 patch Daily PRN   heparin (porcine) injection 5,000 Units 3 times per day   rivastigmine (EXELON) 4.6 MG/24HR 1 patch Daily   benzonatate (TESSALON) capsule 100 mg TID PRN     Labs:   CBC: Recent Labs      02/12/18   1458  02/13/18   0822   WBC  10.0  8.6   RBC  4.03*  3.35*   HGB  11.9*  10.1*   HCT  38.7*  32.5*   PLT  108*  See Reflexed IPF Result   MPV  10.5  NOT REPORTED      BMP: Recent Labs      02/12/18   1458  02/13/18   0822   NA  137  135   K  3.8  4.1   CL  96*  95*   CO2  24  22   BUN  36*  48*   CREATININE  5.94*  6.82*   GLUCOSE  74  83   CALCIUM  10.0  9.6      Radiology:  Admission chest x-ray was consistent with vascular congestion. Assessment:  1 ESRD:dialysis [place].     2.HTN:  3. patient admitted with increasing shortness of breath and CHF. Clinically significantly improved with ultrafiltration  5. ANEMIA OF CHRONIC DISEASE:    Plan:  1. Continue current meds. 2.  From renal standpoint, okay for discharge. Please note her dry weight is down by about 3-4 kg from previous outpatient dry weight        Please do not hesitate to call with questions.     Electronically signed by Keri Rascon MD on 2/14/2018 at 11:48 AM

## 2018-02-14 NOTE — PLAN OF CARE
[facility]    Second Visit Note  For more detailed information please refer to the progress note of the day      2/14/2018    6:28 PM    Name:   Michelle Quintana  MRN:     [id number]     [place]:      [de-identified]   Room:   2001/2001-01  IP Day:  2  Admit Date:  2/12/2018 11:39 AM    PCP:   Jolan Sicard, MD  Code Status:  DNR-CCA        Pt vitals were reviewed   New labs were reviewed   Patient was seen    Updated plan :     1. Spoke with daughter, George Chowdhury. Patient dressed and ready for discharge. Daughter, Carmen Hampton, on the phone. All questions and concerns addressed and answered.          Ruben Belcher MD  2/14/2018  6:28 PM

## 2018-02-14 NOTE — PROGRESS NOTES
[facility]    Progress Note    2/14/2018    11:08 AM    Name:   Ryne Roper  MRN:     [id number]     [place]:      [de-identified]   Room:   2001/2001-01   Day:  2  Admit Date:  2/12/2018 11:39 AM    PCP:   Benita Combs MD  Code Status:  DNR-CCA    Subjective:     C/C:   Chief Complaint   Patient presents with   [facility] Fatigue    Fall     left AMA yesterday, falling down at home and generally weak. last dialysis treatment was Friday     Interval History Status: improved. Patient doing well today. Still complains of cough. Misbah Miser to go home. Per cardio, okay for patient to f/u with stress test as outpatient. To have Echo. Likely dc today. Brief History:     The patient is a 69 y.o. / male who presents with Fatigue and Fall (left AMA yesterday, falling down at home and generally weak. last dialysis treatment was Friday)   and he is admitted to the hospital for the management of hypoxia.      He has the following significant co-morbidities: HTN, ESRD on HD T, Th, Sat, CVA, AOCD, Vascular dementia, HLD, Seizure disorder, DM2.     He was recently admitted and just discharged on 2/11/18 with NSTEMI. Cardiology was consulted during the admission and patient refused all work up, including imaging and lab draws. As such, he was discharged due to his inability to cooperate/general attitude of noncompliance.      He came now with his children and he was noted by his daughter to be confused on 2/12. Home health Aid noted him to have a temp of 101 and pulse ox of 84-86% on room air. He presented to the ED. In the ED, he was saturating at 88% on RA. Improved to 98% on 2 L O2. Also noted to be hypertensive and tachycardic, with 1+ pitting edema B/L. CXR revealed mild congestion. Patient was to have CT per PE protocol due to tachycardia and hypoxia, but patient refused CT.      He was admitted for further care.      Review of Systems: Constitutional:  negative for chills, fevers, sweats  Respiratory:  ++cough, NICHOLAS  Cardiovascular:  negative for chest pain, chest pressure/discomfort, lower extremity edema, palpitations  Gastrointestinal:  negative for abdominal pain, constipation, diarrhea, nausea, vomiting  Neurological:  negative for dizziness, headache    Medications: Allergies:  No Known Allergies    Current Meds:   Scheduled Meds:    dextromethorphan  30 mg Oral 2 times per day    guaiFENesin  600 mg Oral BID    amLODIPine  5 mg Oral Daily    clopidogrel  75 mg Oral Daily    folic acid  1 mg Oral Daily    levETIRAcetam  500 mg Oral BID    lisinopril  10 mg Oral Daily    metoprolol succinate  50 mg Oral Nightly    mirtazapine  45 mg Oral Nightly    pantoprazole  40 mg Oral QAM AC    simvastatin  10 mg Oral Nightly    ziprasidone  40 mg Oral BID WC    sodium chloride flush  10 mL Intravenous 2 times per day    heparin (porcine)  5,000 Units Subcutaneous 3 times per day    rivastigmine  1 patch Transdermal Daily     Continuous Infusions:    PRN Meds: benzocaine-menthol, sodium chloride flush, potassium chloride **OR** potassium chloride **OR** potassium chloride, magnesium sulfate, acetaminophen, HYDROcodone 5 mg - acetaminophen, morphine **OR** morphine, magnesium hydroxide, bisacodyl, ondansetron, nicotine, benzonatate    Data:     Past Medical History:   has a past medical history of Bell's palsy; Chronic kidney disease; Contusion; Depression; Diabetes mellitus ([facility] Diarrhea; Frequent falls; Hyperkalemia; Hyperlipidemia; Hypertension; Neuropathy ([facility] Obesity; Osteoarthritis; Renal cancer ([facility] Renal failure; Seizures ([facility] Sprain of hip or thigh, left; Unspecified cerebral artery occlusion with cerebral infarction; and Wears dentures. Social History:   reports that he has never smoked. He has never used smokeless tobacco. He reports that he does not drink alcohol or use drugs.      Family History:   Family improvement in symptoms.      Recent Admission with NSTEMI. Refused all intervention. Cardio on board. Trop down-trending/stable. Per cardio, unlikely NSTEMI. Okay for outpatient stress test and follow up. Also, outpatient Echo.      General Non-compliance/Lack of cooperation/H/O Leaving AMA. Palliative care on board. Patient made DNR-CCA 2/12/18.      ESRD on HD. Nephrology following. HD 2/13. Due for scheduled session tomorrow 2/15.       HTN. Currently normotensive. Cont amlodipine, lisinopril, metoprolol.      HLD. Cont zocor.      Seizure disorder. Cont keppra.      DVT PPx. SQ heparin     DNR-CCA    Disposition. Likely discharge today.      Quincy Hernandes MD  2/14/2018  11:08 AM

## 2018-02-14 NOTE — PLAN OF CARE
Problem: Falls - Risk of  Goal: Absence of falls  Outcome: Ongoing      Problem: Risk for Impaired Skin Integrity  Goal: Tissue integrity - skin and mucous membranes  Structural intactness and normal physiological function of skin and  mucous membranes.    Outcome: Ongoing      Problem: Breathing Pattern - Ineffective:  Goal: Ability to achieve and maintain a regular respiratory rate will improve  Ability to achieve and maintain a regular respiratory rate will improve   Outcome: Ongoing      Problem: Cardiac Output - Decreased:  Goal: Hemodynamic stability will improve  Hemodynamic stability will improve   Outcome: Ongoing      Problem: Nutrition  Goal: Optimal nutrition therapy  Outcome: Ongoing

## 2018-02-15 ENCOUNTER — CARE COORDINATION (OUTPATIENT)
Dept: CASE MANAGEMENT | Age: 68
End: 2018-02-15

## 2018-02-15 NOTE — FLOWSHEET NOTE
Family asking to see if patient able to ambulate   With walker  To doorway; patient assisted out of bed  With minimal help & ambulated to  Doorway   With front wheel walker & 2 staff at side  No hands  On ; ; daughter states only walks  Short distances ; instructed daughter someone should be  With him when gets up; informed about continued  Services  For PT ,Millie 23 ; family  asking about meds on discharge & what doctor plans ;DR Calista Montoya on unit &  Spoke  With  Family; questions  Answered ; discharge  Instructions  Reviewed ;   Discharged   home

## 2018-02-16 ENCOUNTER — CARE COORDINATION (OUTPATIENT)
Dept: CASE MANAGEMENT | Age: 68
End: 2018-02-16

## 2018-02-16 ENCOUNTER — CARE COORDINATION (OUTPATIENT)
Dept: CARE COORDINATION | Age: 68
End: 2018-02-16

## 2018-02-16 NOTE — CARE COORDINATION
[facility] Transitions Follow Up Call  2018    Patient: Michelle Quintana  Patient : 1950   MRN: [id number]    Reason for Admission: fell @ home, hypoxia  Discharge Date: 18 RARS: Risk Score: 29.75, CM 13      Communicated with eBe Rodriguez,  re: referral to her for daughter, POA's request for placement - unable to care for patient . Patient had been readmitted within 24 hours, then discharged back home when daughter realized that she is unable to provide all care. 2pm: Informed by Marnette Seip that patient will be admitted to [facility] tomorrow. Communicated with daughter, Carmen Hampton. She & family members will transport him to facility tomorrow.    Sierra Landis, [facility] RN who follows SNF patients, added to care team.    Jackelin Hernandez, RN

## 2018-02-16 NOTE — CARE COORDINATION
spoke with [facility] in admissions at [facility]. Per Madiha they already have a LOC completed and referral process started. Madiha reports that she is waiting for their attending to sign the CRF. Informed Madiha the family would like Kishan Wilcox to be admitted today due to the level of care he needs and they are unable to provide for him. Madiha reports that once she has the signature she will contact .  informed Madiha that transportation might need to be set up due to Kishan Wilcox not ambulating. Madiha reports they would be able to supply transportation though mobile care.

## 2018-02-16 NOTE — CARE COORDINATION
Home Benito from [address] [place] [address] phoned and stated that they are able to accept Tabitha Schwartz however the daughter requested for admission tomorrow. Home Benito from [place] reports they are going to set up transportation for Tabitha Schwartz.

## 2018-02-16 NOTE — CARE COORDINATION
Kamala Shen is a 69year old male who is a patient of Dr. Jonnathan Downing. A referral was sent by Geetha Hutchinson RN [facility] to  to help with SNF placement.  phoned Rah's YAA Aziza Dias who reports that Terry Glover is at home currently however needs a night level of care. Aziza Dias reports that they are having to  Terryharriet Rojoy to transport him. Per Hilda Shun is unable to transfer himself and is struggling to feed himself. Aziza Dias reports that she had to assist with feeding him last night. Aziza Dias report that she wanted Terry Golver to be transferred to [address] [place] [address] however they are full. Aziza Dias reports that she is going to go look at [facility].       Social work plan:    will follow up with Aziza Dias on TXCOM    will reach out to SNF and complete application process

## 2018-02-17 ENCOUNTER — CARE COORDINATION (OUTPATIENT)
Dept: CASE MANAGEMENT | Age: 68
End: 2018-02-17

## 2018-03-01 NOTE — CARE COORDINATION
[facility] Update Call    3/1/2018    Patient: Shellie Fay Patient : 1950   MRN: [id number]  Reason for Admission: There are no discharge diagnoses documented for the most recent discharge. Discharge Date: 18 RARS: [facility] Risk Score: 29.75       Care Transitions Post Acute Facility Update    Care Transitions Interventions     Other Services:  (Comment: Agency on Agency)   [address] [place] [address] Dr:  [facility] Update     Verified patient continues with Skilled Services. Spoke with Valeria Parikh in Therapy department, he took a message for his manager, Anat Gonzalez to call with discharge planning, clinical and functional progress report. Contact information provided.

## 2018-03-08 ENCOUNTER — CARE COORDINATION (OUTPATIENT)
Dept: CASE MANAGEMENT | Age: 68
End: 2018-03-08

## 2018-03-16 ENCOUNTER — HOSPITAL ENCOUNTER (OUTPATIENT)
Age: 68
Setting detail: SPECIMEN
Discharge: HOME | End: 2018-03-16
Payer: MEDICARE

## 2018-03-16 LAB — URIC ACID: 3.3 MG/DL (ref 3.4–7)

## 2018-03-16 PROCEDURE — 36415 COLL VENOUS BLD VENIPUNCTURE: CPT

## 2018-03-16 PROCEDURE — 84550 ASSAY OF BLOOD/URIC ACID: CPT

## 2018-03-23 ENCOUNTER — CARE COORDINATION (OUTPATIENT)
Dept: CASE MANAGEMENT | Age: 68
End: 2018-03-23

## 2018-03-23 NOTE — CARE COORDINATION
[facility] Update Call    3/23/2018    Patient: Errol Estrada Patient : 1950   MRN: [id number]  Reason for Admission: There are no discharge diagnoses documented for the most recent discharge.   Discharge Date: 18 RARS: [facility] Risk Score: 29.75       Care Transitions Post Acute Facility Update    Care Transitions Interventions     Other Services:  (Comment: Agency on Agency)   [address] [place] [address] Dr:  [facility] Update  ADLs:  Moderate Assistance   Bed Mobility:  Moderate Assistance   Transfer Assistance:  Maximum Assistance   Ambulation Assistance:  Minimal Assistance   How far (in feet) is the patient ambulating?:  5

## 2018-03-29 ENCOUNTER — CARE COORDINATION (OUTPATIENT)
Dept: CASE MANAGEMENT | Age: 68
End: 2018-03-29

## 2018-04-04 ENCOUNTER — CARE COORDINATION (OUTPATIENT)
Dept: CASE MANAGEMENT | Age: 68
End: 2018-04-04

## 2018-04-06 DIAGNOSIS — R11.0 NAUSEA: ICD-10-CM

## 2018-04-06 RX ORDER — AMLODIPINE BESYLATE 5 MG/1
TABLET ORAL
Qty: 30 TABLET | Refills: 0 | OUTPATIENT
Start: 2018-04-06

## 2018-04-06 RX ORDER — MIRTAZAPINE 45 MG/1
TABLET, FILM COATED ORAL
Qty: 30 TABLET | Refills: 0 | OUTPATIENT
Start: 2018-04-06

## 2018-04-06 RX ORDER — ZIPRASIDONE HYDROCHLORIDE 40 MG/1
CAPSULE ORAL
Qty: 60 CAPSULE | Refills: 0 | OUTPATIENT
Start: 2018-04-06

## 2018-04-09 RX ORDER — OMEPRAZOLE 20 MG/1
CAPSULE, DELAYED RELEASE ORAL
Qty: 30 CAPSULE | Refills: 0 | OUTPATIENT
Start: 2018-04-09

## 2018-04-09 RX ORDER — OMEPRAZOLE 20 MG/1
CAPSULE, DELAYED RELEASE ORAL
Qty: 30 CAPSULE | Refills: 0 | Status: SHIPPED | OUTPATIENT
Start: 2018-04-09 | End: 2018-08-14 | Stop reason: SDUPTHER

## 2018-05-07 ENCOUNTER — CARE COORDINATION (OUTPATIENT)
Dept: CARE COORDINATION | Age: 68
End: 2018-05-07

## 2018-05-15 ENCOUNTER — HOSPITAL ENCOUNTER (INPATIENT)
Age: 68
LOS: 4 days | Discharge: NURSING, REHAB OR LONG-TERM CARE | DRG: 193 | End: 2018-05-19
Attending: EMERGENCY MEDICINE | Admitting: INTERNAL MEDICINE
Payer: MEDICARE

## 2018-05-15 ENCOUNTER — APPOINTMENT (OUTPATIENT)
Dept: CT IMAGING | Age: 68
DRG: 193 | End: 2018-05-15
Payer: MEDICARE

## 2018-05-15 ENCOUNTER — APPOINTMENT (OUTPATIENT)
Dept: GENERAL RADIOLOGY | Age: 68
DRG: 193 | End: 2018-05-15
Payer: MEDICARE

## 2018-05-15 DIAGNOSIS — J18.9 PNEUMONIA DUE TO ORGANISM: Primary | ICD-10-CM

## 2018-05-15 DIAGNOSIS — R53.83 FATIGUE, UNSPECIFIED TYPE: ICD-10-CM

## 2018-05-15 PROBLEM — L85.3 DRY SKIN DERMATITIS: Status: RESOLVED | Noted: 2017-06-19 | Resolved: 2018-05-15

## 2018-05-15 PROBLEM — Z99.2 ESRD (END STAGE RENAL DISEASE) ON DIALYSIS (HCC): Status: ACTIVE | Noted: 2018-05-15

## 2018-05-15 PROBLEM — E87.1 DEHYDRATION WITH HYPONATREMIA: Status: ACTIVE | Noted: 2018-05-15

## 2018-05-15 PROBLEM — D63.1 ANEMIA OF CHRONIC KIDNEY FAILURE: Status: ACTIVE | Noted: 2018-05-15

## 2018-05-15 PROBLEM — I21.4 NSTEMI (NON-ST ELEVATED MYOCARDIAL INFARCTION) (HCC): Status: RESOLVED | Noted: 2018-02-09 | Resolved: 2018-05-15

## 2018-05-15 PROBLEM — N18.6 ESRD (END STAGE RENAL DISEASE) ON DIALYSIS (HCC): Status: RESOLVED | Noted: 2017-09-05 | Resolved: 2018-05-15

## 2018-05-15 PROBLEM — N18.6 ESRD (END STAGE RENAL DISEASE) ON DIALYSIS (HCC): Status: ACTIVE | Noted: 2018-05-15

## 2018-05-15 PROBLEM — E86.0 DEHYDRATION WITH HYPONATREMIA: Status: ACTIVE | Noted: 2018-05-15

## 2018-05-15 PROBLEM — L29.9 PRURITUS: Status: RESOLVED | Noted: 2017-06-19 | Resolved: 2018-05-15

## 2018-05-15 PROBLEM — R09.02 HYPOXIA: Status: RESOLVED | Noted: 2018-02-12 | Resolved: 2018-05-15

## 2018-05-15 PROBLEM — N18.9 ANEMIA OF CHRONIC KIDNEY FAILURE: Status: ACTIVE | Noted: 2018-05-15

## 2018-05-15 PROBLEM — L24.9 IRRITANT DERMATITIS: Status: RESOLVED | Noted: 2017-06-19 | Resolved: 2018-05-15

## 2018-05-15 PROBLEM — Z91.199 NONCOMPLIANCE: Status: RESOLVED | Noted: 2017-09-05 | Resolved: 2018-05-15

## 2018-05-15 PROBLEM — Z99.2 ESRD (END STAGE RENAL DISEASE) ON DIALYSIS (HCC): Status: RESOLVED | Noted: 2017-09-05 | Resolved: 2018-05-15

## 2018-05-15 LAB
ABSOLUTE EOS #: 0 K/UL (ref 0–0.4)
ABSOLUTE IMMATURE GRANULOCYTE: 0 K/UL (ref 0–0.3)
ABSOLUTE LYMPH #: 0.16 K/UL (ref 1–4.8)
ABSOLUTE MONO #: 0.57 K/UL (ref 0.1–0.8)
ALBUMIN SERPL-MCNC: 3.4 G/DL (ref 3.5–5.2)
ALBUMIN/GLOBULIN RATIO: 1 (ref 1–2.5)
ALP BLD-CCNC: 101 U/L (ref 40–129)
ALT SERPL-CCNC: 13 U/L (ref 5–41)
ANION GAP SERPL CALCULATED.3IONS-SCNC: 16 MMOL/L (ref 9–17)
AST SERPL-CCNC: 19 U/L
BASOPHILS # BLD: 1 % (ref 0–2)
BASOPHILS ABSOLUTE: 0.08 K/UL (ref 0–0.2)
BILIRUB SERPL-MCNC: 0.97 MG/DL (ref 0.3–1.2)
BILIRUBIN DIRECT: 0.51 MG/DL
BILIRUBIN, INDIRECT: 0.46 MG/DL (ref 0–1)
BNP INTERPRETATION: ABNORMAL
BUN BLDV-MCNC: 58 MG/DL (ref 8–23)
BUN/CREAT BLD: ABNORMAL (ref 9–20)
CALCIUM SERPL-MCNC: 9.4 MG/DL (ref 8.6–10.4)
CHLORIDE BLD-SCNC: 90 MMOL/L (ref 98–107)
CO2: 23 MMOL/L (ref 20–31)
CREAT SERPL-MCNC: 5.39 MG/DL (ref 0.7–1.2)
DIFFERENTIAL TYPE: ABNORMAL
EKG ATRIAL RATE: 100 BPM
EKG P AXIS: 37 DEGREES
EKG P-R INTERVAL: 176 MS
EKG Q-T INTERVAL: 324 MS
EKG QRS DURATION: 88 MS
EKG QTC CALCULATION (BAZETT): 417 MS
EKG R AXIS: -57 DEGREES
EKG T AXIS: 74 DEGREES
EKG VENTRICULAR RATE: 100 BPM
EOSINOPHILS RELATIVE PERCENT: 0 % (ref 1–4)
GFR AFRICAN AMERICAN: 13 ML/MIN
GFR NON-AFRICAN AMERICAN: 11 ML/MIN
GFR SERPL CREATININE-BSD FRML MDRD: ABNORMAL ML/MIN/{1.73_M2}
GFR SERPL CREATININE-BSD FRML MDRD: ABNORMAL ML/MIN/{1.73_M2}
GLOBULIN: ABNORMAL G/DL (ref 1.5–3.8)
GLUCOSE BLD-MCNC: 197 MG/DL (ref 75–110)
GLUCOSE BLD-MCNC: 69 MG/DL (ref 70–99)
GLUCOSE BLD-MCNC: 77 MG/DL (ref 75–110)
GLUCOSE BLD-MCNC: 79 MG/DL (ref 75–110)
GLUCOSE BLD-MCNC: 81 MG/DL (ref 75–110)
HCT VFR BLD CALC: 40.1 % (ref 40.7–50.3)
HEMOGLOBIN: 12.5 G/DL (ref 13–17)
IMMATURE GRANULOCYTES: 0 %
KEPPRA: 44 UG/ML
LACTIC ACID, WHOLE BLOOD: 1.1 MMOL/L (ref 0.7–2.1)
LYMPHOCYTES # BLD: 2 % (ref 24–44)
MCH RBC QN AUTO: 30.3 PG (ref 25.2–33.5)
MCHC RBC AUTO-ENTMCNC: 31.2 G/DL (ref 28.4–34.8)
MCV RBC AUTO: 97.3 FL (ref 82.6–102.9)
MONOCYTES # BLD: 7 % (ref 1–7)
MORPHOLOGY: ABNORMAL
NRBC AUTOMATED: 0 PER 100 WBC
PDW BLD-RTO: 15.2 % (ref 11.8–14.4)
PLATELET # BLD: ABNORMAL K/UL (ref 138–453)
PLATELET ESTIMATE: ABNORMAL
PLATELET, FLUORESCENCE: 137 K/UL (ref 138–453)
PLATELET, IMMATURE FRACTION: 1.6 % (ref 1.1–10.3)
PMV BLD AUTO: ABNORMAL FL (ref 8.1–13.5)
POC TROPONIN I: 0.07 NG/ML (ref 0–0.1)
POC TROPONIN I: 0.09 NG/ML (ref 0–0.1)
POC TROPONIN INTERP: NORMAL
POC TROPONIN INTERP: NORMAL
POTASSIUM SERPL-SCNC: 4.6 MMOL/L (ref 3.7–5.3)
PRO-BNP: ABNORMAL PG/ML
RBC # BLD: 4.12 M/UL (ref 4.21–5.77)
RBC # BLD: ABNORMAL 10*6/UL
SEG NEUTROPHILS: 90 % (ref 36–66)
SEGMENTED NEUTROPHILS ABSOLUTE COUNT: 7.29 K/UL (ref 1.8–7.7)
SODIUM BLD-SCNC: 129 MMOL/L (ref 135–144)
TOTAL PROTEIN: 6.8 G/DL (ref 6.4–8.3)
TSH SERPL DL<=0.05 MIU/L-ACNC: 2.85 MIU/L (ref 0.3–5)
WBC # BLD: 8.1 K/UL (ref 3.5–11.3)
WBC # BLD: ABNORMAL 10*3/UL

## 2018-05-15 PROCEDURE — 94640 AIRWAY INHALATION TREATMENT: CPT

## 2018-05-15 PROCEDURE — 6370000000 HC RX 637 (ALT 250 FOR IP): Performed by: NURSE PRACTITIONER

## 2018-05-15 PROCEDURE — 82947 ASSAY GLUCOSE BLOOD QUANT: CPT

## 2018-05-15 PROCEDURE — 84484 ASSAY OF TROPONIN QUANT: CPT

## 2018-05-15 PROCEDURE — 83880 ASSAY OF NATRIURETIC PEPTIDE: CPT

## 2018-05-15 PROCEDURE — 96374 THER/PROPH/DIAG INJ IV PUSH: CPT

## 2018-05-15 PROCEDURE — 80048 BASIC METABOLIC PNL TOTAL CA: CPT

## 2018-05-15 PROCEDURE — 6360000002 HC RX W HCPCS: Performed by: INTERNAL MEDICINE

## 2018-05-15 PROCEDURE — 83605 ASSAY OF LACTIC ACID: CPT

## 2018-05-15 PROCEDURE — 84443 ASSAY THYROID STIM HORMONE: CPT

## 2018-05-15 PROCEDURE — 2580000003 HC RX 258: Performed by: EMERGENCY MEDICINE

## 2018-05-15 PROCEDURE — 80076 HEPATIC FUNCTION PANEL: CPT

## 2018-05-15 PROCEDURE — 6360000002 HC RX W HCPCS: Performed by: EMERGENCY MEDICINE

## 2018-05-15 PROCEDURE — 85055 RETICULATED PLATELET ASSAY: CPT

## 2018-05-15 PROCEDURE — 93005 ELECTROCARDIOGRAM TRACING: CPT

## 2018-05-15 PROCEDURE — 85025 COMPLETE CBC W/AUTO DIFF WBC: CPT

## 2018-05-15 PROCEDURE — 2060000000 HC ICU INTERMEDIATE R&B

## 2018-05-15 PROCEDURE — 71250 CT THORAX DX C-: CPT

## 2018-05-15 PROCEDURE — 71045 X-RAY EXAM CHEST 1 VIEW: CPT

## 2018-05-15 PROCEDURE — 2580000003 HC RX 258: Performed by: NURSE PRACTITIONER

## 2018-05-15 PROCEDURE — 99285 EMERGENCY DEPT VISIT HI MDM: CPT

## 2018-05-15 PROCEDURE — 5A1D70Z PERFORMANCE OF URINARY FILTRATION, INTERMITTENT, LESS THAN 6 HOURS PER DAY: ICD-10-PCS | Performed by: INTERNAL MEDICINE

## 2018-05-15 PROCEDURE — 87040 BLOOD CULTURE FOR BACTERIA: CPT

## 2018-05-15 PROCEDURE — 80177 DRUG SCRN QUAN LEVETIRACETAM: CPT

## 2018-05-15 PROCEDURE — 90937 HEMODIALYSIS REPEATED EVAL: CPT

## 2018-05-15 PROCEDURE — 74018 RADEX ABDOMEN 1 VIEW: CPT

## 2018-05-15 PROCEDURE — 99223 1ST HOSP IP/OBS HIGH 75: CPT | Performed by: INTERNAL MEDICINE

## 2018-05-15 RX ORDER — METOPROLOL SUCCINATE 50 MG/1
50 TABLET, EXTENDED RELEASE ORAL NIGHTLY
Status: DISCONTINUED | OUTPATIENT
Start: 2018-05-15 | End: 2018-05-19 | Stop reason: HOSPADM

## 2018-05-15 RX ORDER — BISACODYL 10 MG
10 SUPPOSITORY, RECTAL RECTAL DAILY PRN
Status: DISCONTINUED | OUTPATIENT
Start: 2018-05-15 | End: 2018-05-19 | Stop reason: HOSPADM

## 2018-05-15 RX ORDER — SODIUM CHLORIDE 0.9 % (FLUSH) 0.9 %
10 SYRINGE (ML) INJECTION PRN
Status: DISCONTINUED | OUTPATIENT
Start: 2018-05-15 | End: 2018-05-19 | Stop reason: SDUPTHER

## 2018-05-15 RX ORDER — ACETAMINOPHEN 325 MG/1
650 TABLET ORAL EVERY 4 HOURS PRN
Status: DISCONTINUED | OUTPATIENT
Start: 2018-05-15 | End: 2018-05-15 | Stop reason: SDUPTHER

## 2018-05-15 RX ORDER — LEVOFLOXACIN 5 MG/ML
750 INJECTION, SOLUTION INTRAVENOUS EVERY 24 HOURS
Status: DISCONTINUED | OUTPATIENT
Start: 2018-05-15 | End: 2018-05-15

## 2018-05-15 RX ORDER — CIPROFLOXACIN 2 MG/ML
400 INJECTION, SOLUTION INTRAVENOUS ONCE
Status: COMPLETED | OUTPATIENT
Start: 2018-05-15 | End: 2018-05-15

## 2018-05-15 RX ORDER — PANTOPRAZOLE SODIUM 40 MG/1
40 TABLET, DELAYED RELEASE ORAL
Status: DISCONTINUED | OUTPATIENT
Start: 2018-05-16 | End: 2018-05-19 | Stop reason: HOSPADM

## 2018-05-15 RX ORDER — DEXTROSE MONOHYDRATE 50 MG/ML
100 INJECTION, SOLUTION INTRAVENOUS PRN
Status: DISCONTINUED | OUTPATIENT
Start: 2018-05-15 | End: 2018-05-19 | Stop reason: SDUPTHER

## 2018-05-15 RX ORDER — DEXTROSE MONOHYDRATE 25 G/50ML
12.5 INJECTION, SOLUTION INTRAVENOUS PRN
Status: DISCONTINUED | OUTPATIENT
Start: 2018-05-15 | End: 2018-05-19 | Stop reason: SDUPTHER

## 2018-05-15 RX ORDER — IPRATROPIUM BROMIDE AND ALBUTEROL SULFATE 2.5; .5 MG/3ML; MG/3ML
1 SOLUTION RESPIRATORY (INHALATION)
Status: DISCONTINUED | OUTPATIENT
Start: 2018-05-15 | End: 2018-05-19

## 2018-05-15 RX ORDER — ALBUTEROL SULFATE 2.5 MG/3ML
2.5 SOLUTION RESPIRATORY (INHALATION)
Status: DISCONTINUED | OUTPATIENT
Start: 2018-05-15 | End: 2018-05-19 | Stop reason: HOSPADM

## 2018-05-15 RX ORDER — DEXTROSE MONOHYDRATE 25 G/50ML
25 INJECTION, SOLUTION INTRAVENOUS ONCE
Status: COMPLETED | OUTPATIENT
Start: 2018-05-15 | End: 2018-05-15

## 2018-05-15 RX ORDER — SODIUM CHLORIDE 0.9 % (FLUSH) 0.9 %
10 SYRINGE (ML) INJECTION EVERY 12 HOURS SCHEDULED
Status: DISCONTINUED | OUTPATIENT
Start: 2018-05-15 | End: 2018-05-19 | Stop reason: HOSPADM

## 2018-05-15 RX ORDER — SIMVASTATIN 10 MG
10 TABLET ORAL NIGHTLY
Status: DISCONTINUED | OUTPATIENT
Start: 2018-05-15 | End: 2018-05-19 | Stop reason: HOSPADM

## 2018-05-15 RX ORDER — ACETAMINOPHEN 325 MG/1
650 TABLET ORAL EVERY 6 HOURS PRN
Status: DISCONTINUED | OUTPATIENT
Start: 2018-05-15 | End: 2018-05-19 | Stop reason: HOSPADM

## 2018-05-15 RX ORDER — NICOTINE POLACRILEX 4 MG
15 LOZENGE BUCCAL PRN
Status: DISCONTINUED | OUTPATIENT
Start: 2018-05-15 | End: 2018-05-19 | Stop reason: SDUPTHER

## 2018-05-15 RX ORDER — FOLIC ACID 1 MG/1
1000 TABLET ORAL DAILY
Status: DISCONTINUED | OUTPATIENT
Start: 2018-05-15 | End: 2018-05-19 | Stop reason: HOSPADM

## 2018-05-15 RX ORDER — VANCOMYCIN HYDROCHLORIDE 1 G/200ML
1000 INJECTION, SOLUTION INTRAVENOUS ONCE
Status: COMPLETED | OUTPATIENT
Start: 2018-05-15 | End: 2018-05-15

## 2018-05-15 RX ORDER — HEPARIN SODIUM 5000 [USP'U]/ML
5000 INJECTION, SOLUTION INTRAVENOUS; SUBCUTANEOUS EVERY 8 HOURS SCHEDULED
Status: DISCONTINUED | OUTPATIENT
Start: 2018-05-15 | End: 2018-05-19 | Stop reason: HOSPADM

## 2018-05-15 RX ORDER — NICOTINE 21 MG/24HR
1 PATCH, TRANSDERMAL 24 HOURS TRANSDERMAL DAILY PRN
Status: DISCONTINUED | OUTPATIENT
Start: 2018-05-15 | End: 2018-05-19 | Stop reason: HOSPADM

## 2018-05-15 RX ORDER — 0.9 % SODIUM CHLORIDE 0.9 %
500 INTRAVENOUS SOLUTION INTRAVENOUS ONCE
Status: COMPLETED | OUTPATIENT
Start: 2018-05-15 | End: 2018-05-15

## 2018-05-15 RX ORDER — DOCUSATE SODIUM 100 MG/1
100 CAPSULE, LIQUID FILLED ORAL 2 TIMES DAILY
Status: DISCONTINUED | OUTPATIENT
Start: 2018-05-15 | End: 2018-05-19 | Stop reason: HOSPADM

## 2018-05-15 RX ORDER — LEVETIRACETAM 500 MG/1
500 TABLET ORAL 2 TIMES DAILY
Status: DISCONTINUED | OUTPATIENT
Start: 2018-05-15 | End: 2018-05-19 | Stop reason: HOSPADM

## 2018-05-15 RX ORDER — ONDANSETRON 2 MG/ML
4 INJECTION INTRAMUSCULAR; INTRAVENOUS EVERY 6 HOURS PRN
Status: DISCONTINUED | OUTPATIENT
Start: 2018-05-15 | End: 2018-05-19 | Stop reason: HOSPADM

## 2018-05-15 RX ORDER — CLOPIDOGREL BISULFATE 75 MG/1
75 TABLET ORAL DAILY
Status: DISCONTINUED | OUTPATIENT
Start: 2018-05-15 | End: 2018-05-19 | Stop reason: HOSPADM

## 2018-05-15 RX ORDER — SODIUM CHLORIDE 0.9 % (FLUSH) 0.9 %
10 SYRINGE (ML) INJECTION EVERY 12 HOURS SCHEDULED
Status: DISCONTINUED | OUTPATIENT
Start: 2018-05-15 | End: 2018-05-19 | Stop reason: SDUPTHER

## 2018-05-15 RX ORDER — LISINOPRIL 10 MG/1
10 TABLET ORAL DAILY
Status: DISCONTINUED | OUTPATIENT
Start: 2018-05-15 | End: 2018-05-19 | Stop reason: HOSPADM

## 2018-05-15 RX ORDER — MIDODRINE HYDROCHLORIDE 5 MG/1
10 TABLET ORAL PRN
Status: DISCONTINUED | OUTPATIENT
Start: 2018-05-15 | End: 2018-05-19 | Stop reason: HOSPADM

## 2018-05-15 RX ORDER — SODIUM CHLORIDE 0.9 % (FLUSH) 0.9 %
10 SYRINGE (ML) INJECTION PRN
Status: DISCONTINUED | OUTPATIENT
Start: 2018-05-15 | End: 2018-05-19 | Stop reason: HOSPADM

## 2018-05-15 RX ORDER — AMLODIPINE BESYLATE 5 MG/1
5 TABLET ORAL DAILY
Status: DISCONTINUED | OUTPATIENT
Start: 2018-05-15 | End: 2018-05-18

## 2018-05-15 RX ORDER — ZIPRASIDONE HYDROCHLORIDE 40 MG/1
40 CAPSULE ORAL 2 TIMES DAILY WITH MEALS
Status: DISCONTINUED | OUTPATIENT
Start: 2018-05-15 | End: 2018-05-19 | Stop reason: HOSPADM

## 2018-05-15 RX ADMIN — VANCOMYCIN HYDROCHLORIDE 750 MG: 10 INJECTION, POWDER, LYOPHILIZED, FOR SOLUTION INTRAVENOUS at 17:21

## 2018-05-15 RX ADMIN — CIPROFLOXACIN 400 MG: 2 INJECTION, SOLUTION INTRAVENOUS at 04:15

## 2018-05-15 RX ADMIN — ZIPRASIDONE HYDROCHLORIDE 40 MG: 40 CAPSULE ORAL at 12:57

## 2018-05-15 RX ADMIN — DEXTROSE MONOHYDRATE 25 G: 25 INJECTION, SOLUTION INTRAVENOUS at 03:45

## 2018-05-15 RX ADMIN — SODIUM CHLORIDE 500 ML: 9 INJECTION, SOLUTION INTRAVENOUS at 05:30

## 2018-05-15 RX ADMIN — SIMVASTATIN 10 MG: 10 TABLET, FILM COATED ORAL at 21:40

## 2018-05-15 RX ADMIN — FOLIC ACID 1000 MCG: 1 TABLET ORAL at 12:45

## 2018-05-15 RX ADMIN — METOPROLOL SUCCINATE 50 MG: 50 TABLET, FILM COATED, EXTENDED RELEASE ORAL at 21:40

## 2018-05-15 RX ADMIN — LEVETIRACETAM 500 MG: 500 TABLET, FILM COATED ORAL at 21:39

## 2018-05-15 RX ADMIN — Medication 10 ML: at 21:46

## 2018-05-15 RX ADMIN — LISINOPRIL 10 MG: 10 TABLET ORAL at 21:40

## 2018-05-15 RX ADMIN — CLOPIDOGREL 75 MG: 75 TABLET, FILM COATED ORAL at 12:45

## 2018-05-15 RX ADMIN — IPRATROPIUM BROMIDE AND ALBUTEROL SULFATE 1 AMPULE: .5; 3 SOLUTION RESPIRATORY (INHALATION) at 12:42

## 2018-05-15 RX ADMIN — IPRATROPIUM BROMIDE AND ALBUTEROL SULFATE 1 AMPULE: .5; 3 SOLUTION RESPIRATORY (INHALATION) at 20:14

## 2018-05-15 RX ADMIN — MIRTAZAPINE 45 MG: 30 TABLET, FILM COATED ORAL at 21:39

## 2018-05-15 RX ADMIN — DOXERCALCIFEROL 3 MCG: 2 INJECTION, SOLUTION INTRAVENOUS at 16:45

## 2018-05-15 RX ADMIN — LEVETIRACETAM 500 MG: 500 TABLET, FILM COATED ORAL at 12:45

## 2018-05-15 RX ADMIN — TAZOBACTAM SODIUM AND PIPERACILLIN SODIUM 3.38 G: 375; 3 INJECTION, SOLUTION INTRAVENOUS at 08:14

## 2018-05-15 RX ADMIN — Medication 10 ML: at 13:04

## 2018-05-15 RX ADMIN — VANCOMYCIN HYDROCHLORIDE 1000 MG: 1 INJECTION, SOLUTION INTRAVENOUS at 05:30

## 2018-05-15 RX ADMIN — DOCUSATE SODIUM 100 MG: 100 CAPSULE ORAL at 21:40

## 2018-05-15 ASSESSMENT — ENCOUNTER SYMPTOMS
ALLERGIC/IMMUNOLOGIC NEGATIVE: 1
EYES NEGATIVE: 1
RESPIRATORY NEGATIVE: 1
CONSTIPATION: 1

## 2018-05-15 ASSESSMENT — PAIN SCALES - GENERAL
PAINLEVEL_OUTOF10: 0
PAINLEVEL_OUTOF10: 0

## 2018-05-16 ENCOUNTER — APPOINTMENT (OUTPATIENT)
Dept: GENERAL RADIOLOGY | Age: 68
DRG: 193 | End: 2018-05-16
Payer: MEDICARE

## 2018-05-16 LAB
ANION GAP SERPL CALCULATED.3IONS-SCNC: 16 MMOL/L (ref 9–17)
BUN BLDV-MCNC: 38 MG/DL (ref 8–23)
BUN/CREAT BLD: ABNORMAL (ref 9–20)
CALCIUM SERPL-MCNC: 8.7 MG/DL (ref 8.6–10.4)
CHLORIDE BLD-SCNC: 96 MMOL/L (ref 98–107)
CO2: 24 MMOL/L (ref 20–31)
CREAT SERPL-MCNC: 4.72 MG/DL (ref 0.7–1.2)
DIRECT EXAM: NORMAL
DIRECT EXAM: NORMAL
GFR AFRICAN AMERICAN: 15 ML/MIN
GFR NON-AFRICAN AMERICAN: 12 ML/MIN
GFR SERPL CREATININE-BSD FRML MDRD: ABNORMAL ML/MIN/{1.73_M2}
GFR SERPL CREATININE-BSD FRML MDRD: ABNORMAL ML/MIN/{1.73_M2}
GLUCOSE BLD-MCNC: 117 MG/DL (ref 75–110)
GLUCOSE BLD-MCNC: 133 MG/DL (ref 70–99)
GLUCOSE BLD-MCNC: 73 MG/DL (ref 75–110)
GLUCOSE BLD-MCNC: 84 MG/DL (ref 70–99)
GLUCOSE BLD-MCNC: 84 MG/DL (ref 75–110)
HCT VFR BLD CALC: 35.4 % (ref 40.7–50.3)
HEMOGLOBIN: 11 G/DL (ref 13–17)
Lab: NORMAL
MAGNESIUM: 1.8 MG/DL (ref 1.6–2.6)
MCH RBC QN AUTO: 30.7 PG (ref 25.2–33.5)
MCHC RBC AUTO-ENTMCNC: 31.1 G/DL (ref 28.4–34.8)
MCV RBC AUTO: 98.9 FL (ref 82.6–102.9)
NRBC AUTOMATED: 0 PER 100 WBC
PDW BLD-RTO: 15.2 % (ref 11.8–14.4)
PHOSPHORUS: 5.4 MG/DL (ref 2.5–4.5)
PLATELET # BLD: 93 K/UL (ref 138–453)
PMV BLD AUTO: 10.9 FL (ref 8.1–13.5)
POTASSIUM SERPL-SCNC: 3.8 MMOL/L (ref 3.7–5.3)
POTASSIUM SERPL-SCNC: 3.9 MMOL/L (ref 3.7–5.3)
RBC # BLD: 3.58 M/UL (ref 4.21–5.77)
SODIUM BLD-SCNC: 136 MMOL/L (ref 135–144)
SPECIMEN DESCRIPTION: NORMAL
STATUS: NORMAL
WBC # BLD: 4.9 K/UL (ref 3.5–11.3)

## 2018-05-16 PROCEDURE — G8996 SWALLOW CURRENT STATUS: HCPCS

## 2018-05-16 PROCEDURE — 80048 BASIC METABOLIC PNL TOTAL CA: CPT

## 2018-05-16 PROCEDURE — 82947 ASSAY GLUCOSE BLOOD QUANT: CPT

## 2018-05-16 PROCEDURE — 83735 ASSAY OF MAGNESIUM: CPT

## 2018-05-16 PROCEDURE — 6360000002 HC RX W HCPCS: Performed by: NURSE PRACTITIONER

## 2018-05-16 PROCEDURE — 94762 N-INVAS EAR/PLS OXIMTRY CONT: CPT

## 2018-05-16 PROCEDURE — 87205 SMEAR GRAM STAIN: CPT

## 2018-05-16 PROCEDURE — 99232 SBSQ HOSP IP/OBS MODERATE 35: CPT | Performed by: INTERNAL MEDICINE

## 2018-05-16 PROCEDURE — 93005 ELECTROCARDIOGRAM TRACING: CPT

## 2018-05-16 PROCEDURE — 2060000000 HC ICU INTERMEDIATE R&B

## 2018-05-16 PROCEDURE — 87040 BLOOD CULTURE FOR BACTERIA: CPT

## 2018-05-16 PROCEDURE — 6370000000 HC RX 637 (ALT 250 FOR IP): Performed by: NURSE PRACTITIONER

## 2018-05-16 PROCEDURE — 6360000002 HC RX W HCPCS: Performed by: INTERNAL MEDICINE

## 2018-05-16 PROCEDURE — 74230 X-RAY XM SWLNG FUNCJ C+: CPT

## 2018-05-16 PROCEDURE — 2580000003 HC RX 258: Performed by: INTERNAL MEDICINE

## 2018-05-16 PROCEDURE — G8997 SWALLOW GOAL STATUS: HCPCS

## 2018-05-16 PROCEDURE — 85027 COMPLETE CBC AUTOMATED: CPT

## 2018-05-16 PROCEDURE — 84100 ASSAY OF PHOSPHORUS: CPT

## 2018-05-16 PROCEDURE — 36415 COLL VENOUS BLD VENIPUNCTURE: CPT

## 2018-05-16 PROCEDURE — 87070 CULTURE OTHR SPECIMN AEROBIC: CPT

## 2018-05-16 PROCEDURE — 84132 ASSAY OF SERUM POTASSIUM: CPT

## 2018-05-16 PROCEDURE — 92611 MOTION FLUOROSCOPY/SWALLOW: CPT

## 2018-05-16 PROCEDURE — 94640 AIRWAY INHALATION TREATMENT: CPT

## 2018-05-16 RX ORDER — ACETAMINOPHEN 650 MG/1
650 SUPPOSITORY RECTAL EVERY 4 HOURS PRN
Status: DISCONTINUED | OUTPATIENT
Start: 2018-05-16 | End: 2018-05-19 | Stop reason: HOSPADM

## 2018-05-16 RX ADMIN — SIMVASTATIN 10 MG: 10 TABLET, FILM COATED ORAL at 21:43

## 2018-05-16 RX ADMIN — HEPARIN SODIUM 5000 UNITS: 5000 INJECTION, SOLUTION INTRAVENOUS; SUBCUTANEOUS at 16:24

## 2018-05-16 RX ADMIN — LEVETIRACETAM 500 MG: 500 TABLET, FILM COATED ORAL at 21:42

## 2018-05-16 RX ADMIN — IPRATROPIUM BROMIDE AND ALBUTEROL SULFATE 1 AMPULE: .5; 3 SOLUTION RESPIRATORY (INHALATION) at 09:12

## 2018-05-16 RX ADMIN — IPRATROPIUM BROMIDE AND ALBUTEROL SULFATE 1 AMPULE: .5; 3 SOLUTION RESPIRATORY (INHALATION) at 16:51

## 2018-05-16 RX ADMIN — AZITHROMYCIN MONOHYDRATE 500 MG: 500 INJECTION, POWDER, LYOPHILIZED, FOR SOLUTION INTRAVENOUS at 10:11

## 2018-05-16 RX ADMIN — IPRATROPIUM BROMIDE AND ALBUTEROL SULFATE 1 AMPULE: .5; 3 SOLUTION RESPIRATORY (INHALATION) at 20:15

## 2018-05-16 RX ADMIN — LEVETIRACETAM 500 MG: 500 TABLET, FILM COATED ORAL at 10:16

## 2018-05-16 RX ADMIN — LISINOPRIL 10 MG: 10 TABLET ORAL at 10:16

## 2018-05-16 RX ADMIN — FOLIC ACID 1000 MCG: 1 TABLET ORAL at 10:16

## 2018-05-16 RX ADMIN — CLOPIDOGREL 75 MG: 75 TABLET, FILM COATED ORAL at 10:16

## 2018-05-16 RX ADMIN — ACETAMINOPHEN 650 MG: 325 TABLET ORAL at 10:16

## 2018-05-16 RX ADMIN — AMLODIPINE BESYLATE 5 MG: 5 TABLET ORAL at 10:16

## 2018-05-16 RX ADMIN — METOPROLOL SUCCINATE 50 MG: 50 TABLET, FILM COATED, EXTENDED RELEASE ORAL at 21:43

## 2018-05-16 RX ADMIN — ZIPRASIDONE HYDROCHLORIDE 40 MG: 40 CAPSULE ORAL at 10:16

## 2018-05-16 RX ADMIN — ZIPRASIDONE HYDROCHLORIDE 40 MG: 40 CAPSULE ORAL at 16:37

## 2018-05-16 RX ADMIN — CEFEPIME 500 MG: 1 INJECTION, POWDER, FOR SOLUTION INTRAMUSCULAR; INTRAVENOUS at 08:57

## 2018-05-16 RX ADMIN — DOCUSATE SODIUM 100 MG: 100 CAPSULE ORAL at 10:16

## 2018-05-16 RX ADMIN — MIRTAZAPINE 45 MG: 30 TABLET, FILM COATED ORAL at 21:42

## 2018-05-16 RX ADMIN — IPRATROPIUM BROMIDE AND ALBUTEROL SULFATE 1 AMPULE: .5; 3 SOLUTION RESPIRATORY (INHALATION) at 13:01

## 2018-05-16 ASSESSMENT — PAIN SCALES - GENERAL
PAINLEVEL_OUTOF10: 0
PAINLEVEL_OUTOF10: 0

## 2018-05-17 ENCOUNTER — APPOINTMENT (OUTPATIENT)
Dept: DIALYSIS | Age: 68
DRG: 193 | End: 2018-05-17
Payer: MEDICARE

## 2018-05-17 LAB
ANION GAP SERPL CALCULATED.3IONS-SCNC: 15 MMOL/L (ref 9–17)
BUN BLDV-MCNC: 54 MG/DL (ref 8–23)
BUN/CREAT BLD: ABNORMAL (ref 9–20)
CALCIUM SERPL-MCNC: 8.7 MG/DL (ref 8.6–10.4)
CHLORIDE BLD-SCNC: 98 MMOL/L (ref 98–107)
CO2: 24 MMOL/L (ref 20–31)
CREAT SERPL-MCNC: 5.84 MG/DL (ref 0.7–1.2)
EKG ATRIAL RATE: 96 BPM
EKG P AXIS: 60 DEGREES
EKG P-R INTERVAL: 172 MS
EKG Q-T INTERVAL: 322 MS
EKG QRS DURATION: 90 MS
EKG QTC CALCULATION (BAZETT): 406 MS
EKG R AXIS: -54 DEGREES
EKG T AXIS: 77 DEGREES
EKG VENTRICULAR RATE: 96 BPM
GFR AFRICAN AMERICAN: 12 ML/MIN
GFR NON-AFRICAN AMERICAN: 10 ML/MIN
GFR SERPL CREATININE-BSD FRML MDRD: ABNORMAL ML/MIN/{1.73_M2}
GFR SERPL CREATININE-BSD FRML MDRD: ABNORMAL ML/MIN/{1.73_M2}
GLUCOSE BLD-MCNC: 101 MG/DL (ref 75–110)
GLUCOSE BLD-MCNC: 108 MG/DL (ref 75–110)
GLUCOSE BLD-MCNC: 71 MG/DL (ref 75–110)
GLUCOSE BLD-MCNC: 82 MG/DL (ref 75–110)
GLUCOSE BLD-MCNC: 83 MG/DL (ref 70–99)
GLUCOSE BLD-MCNC: 87 MG/DL (ref 75–110)
POTASSIUM SERPL-SCNC: 4 MMOL/L (ref 3.7–5.3)
SODIUM BLD-SCNC: 137 MMOL/L (ref 135–144)

## 2018-05-17 PROCEDURE — 80048 BASIC METABOLIC PNL TOTAL CA: CPT

## 2018-05-17 PROCEDURE — 2580000003 HC RX 258: Performed by: EMERGENCY MEDICINE

## 2018-05-17 PROCEDURE — 6370000000 HC RX 637 (ALT 250 FOR IP): Performed by: NURSE PRACTITIONER

## 2018-05-17 PROCEDURE — 2060000000 HC ICU INTERMEDIATE R&B

## 2018-05-17 PROCEDURE — 82947 ASSAY GLUCOSE BLOOD QUANT: CPT

## 2018-05-17 PROCEDURE — 90937 HEMODIALYSIS REPEATED EVAL: CPT

## 2018-05-17 PROCEDURE — 6360000002 HC RX W HCPCS: Performed by: NURSE PRACTITIONER

## 2018-05-17 PROCEDURE — 94640 AIRWAY INHALATION TREATMENT: CPT

## 2018-05-17 PROCEDURE — 6360000002 HC RX W HCPCS: Performed by: INTERNAL MEDICINE

## 2018-05-17 PROCEDURE — 2580000003 HC RX 258: Performed by: NURSE PRACTITIONER

## 2018-05-17 PROCEDURE — 6360000002 HC RX W HCPCS: Performed by: EMERGENCY MEDICINE

## 2018-05-17 PROCEDURE — 99233 SBSQ HOSP IP/OBS HIGH 50: CPT | Performed by: INTERNAL MEDICINE

## 2018-05-17 PROCEDURE — 2580000003 HC RX 258: Performed by: INTERNAL MEDICINE

## 2018-05-17 RX ADMIN — PANTOPRAZOLE SODIUM 40 MG: 40 TABLET, DELAYED RELEASE ORAL at 05:59

## 2018-05-17 RX ADMIN — LEVETIRACETAM 500 MG: 500 TABLET, FILM COATED ORAL at 21:18

## 2018-05-17 RX ADMIN — AZITHROMYCIN MONOHYDRATE 500 MG: 500 INJECTION, POWDER, LYOPHILIZED, FOR SOLUTION INTRAVENOUS at 14:57

## 2018-05-17 RX ADMIN — SIMVASTATIN 10 MG: 10 TABLET, FILM COATED ORAL at 21:18

## 2018-05-17 RX ADMIN — Medication 10 ML: at 21:36

## 2018-05-17 RX ADMIN — IPRATROPIUM BROMIDE AND ALBUTEROL SULFATE 1 AMPULE: .5; 3 SOLUTION RESPIRATORY (INHALATION) at 19:57

## 2018-05-17 RX ADMIN — HEPARIN SODIUM 5000 UNITS: 5000 INJECTION, SOLUTION INTRAVENOUS; SUBCUTANEOUS at 21:18

## 2018-05-17 RX ADMIN — MIRTAZAPINE 45 MG: 30 TABLET, FILM COATED ORAL at 21:18

## 2018-05-17 RX ADMIN — METOPROLOL SUCCINATE 50 MG: 50 TABLET, FILM COATED, EXTENDED RELEASE ORAL at 21:18

## 2018-05-17 RX ADMIN — IPRATROPIUM BROMIDE AND ALBUTEROL SULFATE 1 AMPULE: .5; 3 SOLUTION RESPIRATORY (INHALATION) at 15:34

## 2018-05-17 RX ADMIN — ZIPRASIDONE HYDROCHLORIDE 40 MG: 40 CAPSULE ORAL at 18:59

## 2018-05-17 RX ADMIN — HEPARIN SODIUM 5000 UNITS: 5000 INJECTION, SOLUTION INTRAVENOUS; SUBCUTANEOUS at 14:57

## 2018-05-17 RX ADMIN — PIPERACILLIN SODIUM,TAZOBACTAM SODIUM 2.25 G: 2; .25 INJECTION, POWDER, FOR SOLUTION INTRAVENOUS at 18:07

## 2018-05-17 RX ADMIN — ACETAMINOPHEN 650 MG: 325 TABLET ORAL at 04:48

## 2018-05-17 RX ADMIN — DOXERCALCIFEROL 3 MCG: 2 INJECTION, SOLUTION INTRAVENOUS at 09:24

## 2018-05-17 RX ADMIN — DOCUSATE SODIUM 100 MG: 100 CAPSULE ORAL at 21:18

## 2018-05-17 RX ADMIN — VANCOMYCIN HYDROCHLORIDE 750 MG: 10 INJECTION, POWDER, LYOPHILIZED, FOR SOLUTION INTRAVENOUS at 10:58

## 2018-05-17 ASSESSMENT — PAIN SCALES - GENERAL
PAINLEVEL_OUTOF10: 0

## 2018-05-18 LAB
CULTURE: NORMAL
CULTURE: NORMAL
DIRECT EXAM: ABNORMAL
DIRECT EXAM: NORMAL
GLUCOSE BLD-MCNC: 106 MG/DL (ref 75–110)
GLUCOSE BLD-MCNC: 119 MG/DL (ref 75–110)
GLUCOSE BLD-MCNC: 85 MG/DL (ref 75–110)
Lab: ABNORMAL
Lab: NORMAL
SPECIMEN DESCRIPTION: ABNORMAL
SPECIMEN DESCRIPTION: NORMAL
STATUS: ABNORMAL
STATUS: NORMAL

## 2018-05-18 PROCEDURE — 6360000002 HC RX W HCPCS: Performed by: INTERNAL MEDICINE

## 2018-05-18 PROCEDURE — 2060000000 HC ICU INTERMEDIATE R&B

## 2018-05-18 PROCEDURE — 6360000002 HC RX W HCPCS: Performed by: NURSE PRACTITIONER

## 2018-05-18 PROCEDURE — 82947 ASSAY GLUCOSE BLOOD QUANT: CPT

## 2018-05-18 PROCEDURE — 6370000000 HC RX 637 (ALT 250 FOR IP): Performed by: NURSE PRACTITIONER

## 2018-05-18 PROCEDURE — 94640 AIRWAY INHALATION TREATMENT: CPT

## 2018-05-18 PROCEDURE — 2580000003 HC RX 258: Performed by: NURSE PRACTITIONER

## 2018-05-18 PROCEDURE — 94762 N-INVAS EAR/PLS OXIMTRY CONT: CPT

## 2018-05-18 PROCEDURE — 76937 US GUIDE VASCULAR ACCESS: CPT

## 2018-05-18 PROCEDURE — 99232 SBSQ HOSP IP/OBS MODERATE 35: CPT | Performed by: INTERNAL MEDICINE

## 2018-05-18 PROCEDURE — 2580000003 HC RX 258: Performed by: INTERNAL MEDICINE

## 2018-05-18 PROCEDURE — 6370000000 HC RX 637 (ALT 250 FOR IP): Performed by: INTERNAL MEDICINE

## 2018-05-18 PROCEDURE — 87804 INFLUENZA ASSAY W/OPTIC: CPT

## 2018-05-18 RX ORDER — AMLODIPINE BESYLATE 10 MG/1
10 TABLET ORAL DAILY
Status: DISCONTINUED | OUTPATIENT
Start: 2018-05-18 | End: 2018-05-19 | Stop reason: HOSPADM

## 2018-05-18 RX ORDER — OSELTAMIVIR PHOSPHATE 30 MG/1
30 CAPSULE ORAL EVERY OTHER DAY
Status: DISCONTINUED | OUTPATIENT
Start: 2018-05-18 | End: 2018-05-19 | Stop reason: HOSPADM

## 2018-05-18 RX ADMIN — Medication 10 ML: at 20:35

## 2018-05-18 RX ADMIN — FOLIC ACID 1000 MCG: 1 TABLET ORAL at 08:53

## 2018-05-18 RX ADMIN — OSELTAMIVIR PHOSPHATE 30 MG: 30 CAPSULE ORAL at 12:55

## 2018-05-18 RX ADMIN — CLOPIDOGREL 75 MG: 75 TABLET, FILM COATED ORAL at 08:53

## 2018-05-18 RX ADMIN — IPRATROPIUM BROMIDE AND ALBUTEROL SULFATE 1 AMPULE: .5; 3 SOLUTION RESPIRATORY (INHALATION) at 19:50

## 2018-05-18 RX ADMIN — ZIPRASIDONE HYDROCHLORIDE 40 MG: 40 CAPSULE ORAL at 08:53

## 2018-05-18 RX ADMIN — HEPARIN SODIUM 5000 UNITS: 5000 INJECTION, SOLUTION INTRAVENOUS; SUBCUTANEOUS at 13:00

## 2018-05-18 RX ADMIN — IPRATROPIUM BROMIDE AND ALBUTEROL SULFATE 1 AMPULE: .5; 3 SOLUTION RESPIRATORY (INHALATION) at 07:52

## 2018-05-18 RX ADMIN — HEPARIN SODIUM 5000 UNITS: 5000 INJECTION, SOLUTION INTRAVENOUS; SUBCUTANEOUS at 06:17

## 2018-05-18 RX ADMIN — LISINOPRIL 10 MG: 10 TABLET ORAL at 08:53

## 2018-05-18 RX ADMIN — HEPARIN SODIUM 5000 UNITS: 5000 INJECTION, SOLUTION INTRAVENOUS; SUBCUTANEOUS at 23:24

## 2018-05-18 RX ADMIN — PANTOPRAZOLE SODIUM 40 MG: 40 TABLET, DELAYED RELEASE ORAL at 08:53

## 2018-05-18 RX ADMIN — AZITHROMYCIN MONOHYDRATE 500 MG: 500 INJECTION, POWDER, LYOPHILIZED, FOR SOLUTION INTRAVENOUS at 08:42

## 2018-05-18 RX ADMIN — MIRTAZAPINE 45 MG: 30 TABLET, FILM COATED ORAL at 20:31

## 2018-05-18 RX ADMIN — PIPERACILLIN SODIUM,TAZOBACTAM SODIUM 2.25 G: 2; .25 INJECTION, POWDER, FOR SOLUTION INTRAVENOUS at 13:54

## 2018-05-18 RX ADMIN — AMLODIPINE BESYLATE 10 MG: 10 TABLET ORAL at 10:00

## 2018-05-18 RX ADMIN — IPRATROPIUM BROMIDE AND ALBUTEROL SULFATE 1 AMPULE: .5; 3 SOLUTION RESPIRATORY (INHALATION) at 15:45

## 2018-05-18 RX ADMIN — DOCUSATE SODIUM 100 MG: 100 CAPSULE ORAL at 08:53

## 2018-05-18 RX ADMIN — SIMVASTATIN 10 MG: 10 TABLET, FILM COATED ORAL at 20:31

## 2018-05-18 RX ADMIN — LEVETIRACETAM 500 MG: 500 TABLET, FILM COATED ORAL at 08:53

## 2018-05-18 RX ADMIN — ZIPRASIDONE HYDROCHLORIDE 40 MG: 40 CAPSULE ORAL at 18:52

## 2018-05-18 RX ADMIN — IPRATROPIUM BROMIDE AND ALBUTEROL SULFATE 1 AMPULE: .5; 3 SOLUTION RESPIRATORY (INHALATION) at 12:11

## 2018-05-18 RX ADMIN — METOPROLOL SUCCINATE 50 MG: 50 TABLET, FILM COATED, EXTENDED RELEASE ORAL at 20:31

## 2018-05-18 RX ADMIN — LEVETIRACETAM 500 MG: 500 TABLET, FILM COATED ORAL at 20:31

## 2018-05-18 RX ADMIN — DOCUSATE SODIUM 100 MG: 100 CAPSULE ORAL at 20:31

## 2018-05-18 RX ADMIN — PIPERACILLIN SODIUM,TAZOBACTAM SODIUM 2.25 G: 2; .25 INJECTION, POWDER, FOR SOLUTION INTRAVENOUS at 06:13

## 2018-05-18 ASSESSMENT — PAIN SCALES - GENERAL
PAINLEVEL_OUTOF10: 0
PAINLEVEL_OUTOF10: 0

## 2018-05-19 ENCOUNTER — APPOINTMENT (OUTPATIENT)
Dept: DIALYSIS | Age: 68
DRG: 193 | End: 2018-05-19
Payer: MEDICARE

## 2018-05-19 VITALS
BODY MASS INDEX: 23.15 KG/M2 | WEIGHT: 156.31 LBS | DIASTOLIC BLOOD PRESSURE: 58 MMHG | OXYGEN SATURATION: 95 % | TEMPERATURE: 98.1 F | HEIGHT: 69 IN | HEART RATE: 71 BPM | RESPIRATION RATE: 16 BRPM | SYSTOLIC BLOOD PRESSURE: 139 MMHG

## 2018-05-19 PROBLEM — J10.1 INFLUENZA A: Status: ACTIVE | Noted: 2018-05-19

## 2018-05-19 LAB
ABSOLUTE EOS #: 0 K/UL (ref 0–0.4)
ABSOLUTE IMMATURE GRANULOCYTE: 0 K/UL (ref 0–0.3)
ABSOLUTE LYMPH #: 0.72 K/UL (ref 1–4.8)
ABSOLUTE MONO #: 0.32 K/UL (ref 0.1–0.8)
ANION GAP SERPL CALCULATED.3IONS-SCNC: 17 MMOL/L (ref 9–17)
BASOPHILS # BLD: 0 % (ref 0–2)
BASOPHILS ABSOLUTE: 0 K/UL (ref 0–0.2)
BUN BLDV-MCNC: 60 MG/DL (ref 8–23)
BUN/CREAT BLD: ABNORMAL (ref 9–20)
CALCIUM SERPL-MCNC: 8.8 MG/DL (ref 8.6–10.4)
CHLORIDE BLD-SCNC: 94 MMOL/L (ref 98–107)
CO2: 21 MMOL/L (ref 20–31)
CREAT SERPL-MCNC: 5.52 MG/DL (ref 0.7–1.2)
DIFFERENTIAL TYPE: ABNORMAL
EOSINOPHILS RELATIVE PERCENT: 0 % (ref 1–4)
GFR AFRICAN AMERICAN: 13 ML/MIN
GFR NON-AFRICAN AMERICAN: 10 ML/MIN
GFR SERPL CREATININE-BSD FRML MDRD: ABNORMAL ML/MIN/{1.73_M2}
GFR SERPL CREATININE-BSD FRML MDRD: ABNORMAL ML/MIN/{1.73_M2}
GLUCOSE BLD-MCNC: 69 MG/DL (ref 75–110)
GLUCOSE BLD-MCNC: 78 MG/DL (ref 70–99)
GLUCOSE BLD-MCNC: 84 MG/DL (ref 75–110)
GLUCOSE BLD-MCNC: 86 MG/DL (ref 75–110)
HCT VFR BLD CALC: 41.2 % (ref 40.7–50.3)
HEMOGLOBIN: 11.9 G/DL (ref 13–17)
IMMATURE GRANULOCYTES: 0 %
LYMPHOCYTES # BLD: 18 % (ref 24–44)
MCH RBC QN AUTO: 29.8 PG (ref 25.2–33.5)
MCHC RBC AUTO-ENTMCNC: 28.9 G/DL (ref 28.4–34.8)
MCV RBC AUTO: 103.3 FL (ref 82.6–102.9)
MONOCYTES # BLD: 8 % (ref 1–7)
MORPHOLOGY: ABNORMAL
NRBC AUTOMATED: 0 PER 100 WBC
PDW BLD-RTO: 14.9 % (ref 11.8–14.4)
PHOSPHORUS: 6.3 MG/DL (ref 2.5–4.5)
PLATELET # BLD: 65 K/UL (ref 138–453)
PLATELET ESTIMATE: ABNORMAL
PMV BLD AUTO: 11.2 FL (ref 8.1–13.5)
POTASSIUM SERPL-SCNC: 4.2 MMOL/L (ref 3.7–5.3)
RBC # BLD: 3.99 M/UL (ref 4.21–5.77)
RBC # BLD: ABNORMAL 10*6/UL
SEG NEUTROPHILS: 74 % (ref 36–66)
SEGMENTED NEUTROPHILS ABSOLUTE COUNT: 2.96 K/UL (ref 1.8–7.7)
SODIUM BLD-SCNC: 132 MMOL/L (ref 135–144)
WBC # BLD: 4 K/UL (ref 3.5–11.3)
WBC # BLD: ABNORMAL 10*3/UL

## 2018-05-19 PROCEDURE — 84100 ASSAY OF PHOSPHORUS: CPT

## 2018-05-19 PROCEDURE — 85025 COMPLETE CBC W/AUTO DIFF WBC: CPT

## 2018-05-19 PROCEDURE — 90937 HEMODIALYSIS REPEATED EVAL: CPT

## 2018-05-19 PROCEDURE — 6360000002 HC RX W HCPCS: Performed by: NURSE PRACTITIONER

## 2018-05-19 PROCEDURE — 2580000003 HC RX 258: Performed by: EMERGENCY MEDICINE

## 2018-05-19 PROCEDURE — 36415 COLL VENOUS BLD VENIPUNCTURE: CPT

## 2018-05-19 PROCEDURE — 94640 AIRWAY INHALATION TREATMENT: CPT

## 2018-05-19 PROCEDURE — 94762 N-INVAS EAR/PLS OXIMTRY CONT: CPT

## 2018-05-19 PROCEDURE — 99239 HOSP IP/OBS DSCHRG MGMT >30: CPT | Performed by: INTERNAL MEDICINE

## 2018-05-19 PROCEDURE — 6370000000 HC RX 637 (ALT 250 FOR IP): Performed by: NURSE PRACTITIONER

## 2018-05-19 PROCEDURE — 6360000002 HC RX W HCPCS: Performed by: INTERNAL MEDICINE

## 2018-05-19 PROCEDURE — 2580000003 HC RX 258: Performed by: INTERNAL MEDICINE

## 2018-05-19 PROCEDURE — 82947 ASSAY GLUCOSE BLOOD QUANT: CPT

## 2018-05-19 PROCEDURE — 80048 BASIC METABOLIC PNL TOTAL CA: CPT

## 2018-05-19 RX ORDER — DEXTROSE MONOHYDRATE 25 G/50ML
12.5 INJECTION, SOLUTION INTRAVENOUS PRN
Status: DISCONTINUED | OUTPATIENT
Start: 2018-05-19 | End: 2018-05-19 | Stop reason: HOSPADM

## 2018-05-19 RX ORDER — OSELTAMIVIR PHOSPHATE 30 MG/1
30 CAPSULE ORAL EVERY OTHER DAY
DISCHARGE
Start: 2018-05-20 | End: 2018-05-23

## 2018-05-19 RX ORDER — IPRATROPIUM BROMIDE AND ALBUTEROL SULFATE 2.5; .5 MG/3ML; MG/3ML
3 SOLUTION RESPIRATORY (INHALATION) 3 TIMES DAILY
Qty: 360 ML | DISCHARGE
Start: 2018-05-19

## 2018-05-19 RX ORDER — DEXTROSE MONOHYDRATE 25 G/50ML
12.5 INJECTION, SOLUTION INTRAVENOUS PRN
Status: DISCONTINUED | OUTPATIENT
Start: 2018-05-19 | End: 2018-05-19 | Stop reason: SDUPTHER

## 2018-05-19 RX ORDER — DEXTROSE MONOHYDRATE 50 MG/ML
100 INJECTION, SOLUTION INTRAVENOUS PRN
Status: DISCONTINUED | OUTPATIENT
Start: 2018-05-19 | End: 2018-05-19 | Stop reason: SDUPTHER

## 2018-05-19 RX ORDER — MIDODRINE HYDROCHLORIDE 10 MG/1
10 TABLET ORAL PRN
Qty: 90 TABLET | Refills: 3 | Status: ON HOLD | DISCHARGE
Start: 2018-05-19 | End: 2019-07-05 | Stop reason: HOSPADM

## 2018-05-19 RX ORDER — NICOTINE 21 MG/24HR
1 PATCH, TRANSDERMAL 24 HOURS TRANSDERMAL DAILY PRN
Qty: 30 PATCH | Refills: 3 | Status: ON HOLD | DISCHARGE
Start: 2018-05-19 | End: 2019-07-05 | Stop reason: HOSPADM

## 2018-05-19 RX ORDER — IPRATROPIUM BROMIDE AND ALBUTEROL SULFATE 2.5; .5 MG/3ML; MG/3ML
1 SOLUTION RESPIRATORY (INHALATION) 3 TIMES DAILY
Status: DISCONTINUED | OUTPATIENT
Start: 2018-05-19 | End: 2018-05-19 | Stop reason: HOSPADM

## 2018-05-19 RX ORDER — NICOTINE POLACRILEX 4 MG
15 LOZENGE BUCCAL PRN
Status: DISCONTINUED | OUTPATIENT
Start: 2018-05-19 | End: 2018-05-19 | Stop reason: HOSPADM

## 2018-05-19 RX ORDER — NICOTINE POLACRILEX 4 MG
15 LOZENGE BUCCAL PRN
Status: DISCONTINUED | OUTPATIENT
Start: 2018-05-19 | End: 2018-05-19 | Stop reason: SDUPTHER

## 2018-05-19 RX ORDER — DEXTROSE MONOHYDRATE 50 MG/ML
100 INJECTION, SOLUTION INTRAVENOUS PRN
Status: DISCONTINUED | OUTPATIENT
Start: 2018-05-19 | End: 2018-05-19 | Stop reason: HOSPADM

## 2018-05-19 RX ORDER — ALBUTEROL SULFATE 2.5 MG/3ML
2.5 SOLUTION RESPIRATORY (INHALATION)
Qty: 120 EACH | Refills: 3 | DISCHARGE
Start: 2018-05-19

## 2018-05-19 RX ADMIN — DOCUSATE SODIUM 100 MG: 100 CAPSULE ORAL at 10:25

## 2018-05-19 RX ADMIN — Medication 10 ML: at 14:07

## 2018-05-19 RX ADMIN — PIPERACILLIN SODIUM,TAZOBACTAM SODIUM 2.25 G: 2; .25 INJECTION, POWDER, FOR SOLUTION INTRAVENOUS at 03:51

## 2018-05-19 RX ADMIN — FOLIC ACID 1000 MCG: 1 TABLET ORAL at 10:22

## 2018-05-19 RX ADMIN — LEVETIRACETAM 500 MG: 500 TABLET, FILM COATED ORAL at 10:23

## 2018-05-19 RX ADMIN — CLOPIDOGREL 75 MG: 75 TABLET, FILM COATED ORAL at 10:22

## 2018-05-19 RX ADMIN — ZIPRASIDONE HYDROCHLORIDE 40 MG: 40 CAPSULE ORAL at 10:23

## 2018-05-19 RX ADMIN — DOXERCALCIFEROL 3 MCG: 2 INJECTION, SOLUTION INTRAVENOUS at 15:29

## 2018-05-19 RX ADMIN — PANTOPRAZOLE SODIUM 40 MG: 40 TABLET, DELAYED RELEASE ORAL at 10:22

## 2018-05-19 RX ADMIN — HEPARIN SODIUM 5000 UNITS: 5000 INJECTION, SOLUTION INTRAVENOUS; SUBCUTANEOUS at 07:55

## 2018-05-19 RX ADMIN — IPRATROPIUM BROMIDE AND ALBUTEROL SULFATE 1 AMPULE: .5; 3 SOLUTION RESPIRATORY (INHALATION) at 07:53

## 2018-05-19 ASSESSMENT — PAIN SCALES - GENERAL: PAINLEVEL_OUTOF10: 0

## 2018-05-20 LAB — GLUCOSE BLD-MCNC: 96 MG/DL (ref 75–110)

## 2018-05-21 LAB
CULTURE: NORMAL
CULTURE: NORMAL
Lab: NORMAL
SPECIMEN DESCRIPTION: NORMAL
STATUS: NORMAL

## 2018-05-22 LAB
CULTURE: NORMAL
CULTURE: NORMAL
Lab: NORMAL
SPECIMEN DESCRIPTION: NORMAL
STATUS: NORMAL

## 2018-05-24 ENCOUNTER — CARE COORDINATION (OUTPATIENT)
Dept: CASE MANAGEMENT | Age: 68
End: 2018-05-24

## 2018-05-24 ENCOUNTER — HOSPITAL ENCOUNTER (OUTPATIENT)
Age: 68
Setting detail: SPECIMEN
Discharge: HOME | End: 2018-05-24
Payer: MEDICARE

## 2018-05-24 LAB
ALBUMIN SERPL-MCNC: 3.2 G/DL (ref 3.5–5.2)
ALBUMIN/GLOBULIN RATIO: 0.9 (ref 1–2.5)
ALP BLD-CCNC: 118 U/L (ref 40–129)
ALT SERPL-CCNC: 26 U/L (ref 5–41)
ANION GAP SERPL CALCULATED.3IONS-SCNC: 15 MMOL/L (ref 9–17)
AST SERPL-CCNC: 30 U/L
BILIRUB SERPL-MCNC: 0.5 MG/DL (ref 0.3–1.2)
BUN BLDV-MCNC: 61 MG/DL (ref 8–23)
BUN/CREAT BLD: ABNORMAL (ref 9–20)
CALCIUM SERPL-MCNC: 9.6 MG/DL (ref 8.6–10.4)
CHLORIDE BLD-SCNC: 94 MMOL/L (ref 98–107)
CHOLESTEROL/HDL RATIO: 2
CHOLESTEROL: 79 MG/DL
CO2: 26 MMOL/L (ref 20–31)
CREAT SERPL-MCNC: 5.93 MG/DL (ref 0.7–1.2)
GFR AFRICAN AMERICAN: 12 ML/MIN
GFR NON-AFRICAN AMERICAN: 10 ML/MIN
GFR SERPL CREATININE-BSD FRML MDRD: ABNORMAL ML/MIN/{1.73_M2}
GFR SERPL CREATININE-BSD FRML MDRD: ABNORMAL ML/MIN/{1.73_M2}
GLUCOSE BLD-MCNC: 99 MG/DL (ref 70–99)
HDLC SERPL-MCNC: 40 MG/DL
LDL CHOLESTEROL: 31 MG/DL (ref 0–130)
POTASSIUM SERPL-SCNC: 5.2 MMOL/L (ref 3.7–5.3)
SODIUM BLD-SCNC: 135 MMOL/L (ref 135–144)
TOTAL PROTEIN: 6.9 G/DL (ref 6.4–8.3)
TRIGL SERPL-MCNC: 41 MG/DL
VITAMIN D 25-HYDROXY: 17.9 NG/ML (ref 30–100)
VLDLC SERPL CALC-MCNC: ABNORMAL MG/DL (ref 1–30)

## 2018-05-24 PROCEDURE — P9603 ONE-WAY ALLOW PRORATED MILES: HCPCS

## 2018-05-24 PROCEDURE — 36415 COLL VENOUS BLD VENIPUNCTURE: CPT

## 2018-05-24 PROCEDURE — 83036 HEMOGLOBIN GLYCOSYLATED A1C: CPT

## 2018-05-24 PROCEDURE — 80061 LIPID PANEL: CPT

## 2018-05-24 PROCEDURE — 82306 VITAMIN D 25 HYDROXY: CPT

## 2018-05-24 PROCEDURE — 80053 COMPREHEN METABOLIC PANEL: CPT

## 2018-05-25 ENCOUNTER — TELEPHONE (OUTPATIENT)
Dept: FAMILY MEDICINE CLINIC | Age: 68
End: 2018-05-25

## 2018-05-25 LAB
ESTIMATED AVERAGE GLUCOSE: 80 MG/DL
HBA1C MFR BLD: 4.4 % (ref 4–6)

## 2018-06-18 ENCOUNTER — CARE COORDINATION (OUTPATIENT)
Dept: CASE MANAGEMENT | Age: 68
End: 2018-06-18

## 2018-06-18 PROBLEM — J10.1 INFLUENZA A: Status: RESOLVED | Noted: 2018-05-19 | Resolved: 2018-06-18

## 2018-06-27 ENCOUNTER — CARE COORDINATION (OUTPATIENT)
Dept: CASE MANAGEMENT | Age: 68
End: 2018-06-27

## 2018-06-29 ENCOUNTER — CARE COORDINATION (OUTPATIENT)
Dept: CASE MANAGEMENT | Age: 68
End: 2018-06-29

## 2018-06-29 DIAGNOSIS — C64.9 RENAL CELL CARCINOMA, UNSPECIFIED LATERALITY (HCC): Primary | ICD-10-CM

## 2018-06-29 PROCEDURE — 1111F DSCHRG MED/CURRENT MED MERGE: CPT

## 2018-07-05 ENCOUNTER — CARE COORDINATION (OUTPATIENT)
Dept: CARE COORDINATION | Age: 68
End: 2018-07-05

## 2018-07-05 NOTE — CARE COORDINATION
called and spoke to Rah's daughter Berlin Dunaway. Berlin Dunaway reports that her dad was discharged and is setup with home health care and an aid from [place]. Berlin Dunaway denied any other needs concerns or questions.  is signing off case.

## 2018-08-06 ENCOUNTER — OFFICE VISIT (OUTPATIENT)
Dept: FAMILY MEDICINE CLINIC | Age: 68
End: 2018-08-06
Payer: MEDICARE

## 2018-08-06 VITALS
WEIGHT: 156 LBS | SYSTOLIC BLOOD PRESSURE: 135 MMHG | OXYGEN SATURATION: 97 % | DIASTOLIC BLOOD PRESSURE: 71 MMHG | BODY MASS INDEX: 23.11 KG/M2 | TEMPERATURE: 98.2 F | RESPIRATION RATE: 20 BRPM | HEIGHT: 69 IN | HEART RATE: 78 BPM

## 2018-08-06 DIAGNOSIS — N18.6 ESRD ON HEMODIALYSIS (HCC): ICD-10-CM

## 2018-08-06 DIAGNOSIS — N18.6 TYPE 2 DIABETES MELLITUS WITH CHRONIC KIDNEY DISEASE ON CHRONIC DIALYSIS, WITHOUT LONG-TERM CURRENT USE OF INSULIN (HCC): ICD-10-CM

## 2018-08-06 DIAGNOSIS — Z99.2 ESRD ON HEMODIALYSIS (HCC): ICD-10-CM

## 2018-08-06 DIAGNOSIS — N18.4 CHRONIC KIDNEY DISEASE, STAGE IV (SEVERE) (HCC): ICD-10-CM

## 2018-08-06 DIAGNOSIS — F01.50 VASCULAR DEMENTIA WITHOUT BEHAVIORAL DISTURBANCE (HCC): ICD-10-CM

## 2018-08-06 DIAGNOSIS — I10 ESSENTIAL HYPERTENSION: Primary | ICD-10-CM

## 2018-08-06 DIAGNOSIS — F25.1 SCHIZOAFFECTIVE DISORDER, DEPRESSIVE TYPE (HCC): ICD-10-CM

## 2018-08-06 DIAGNOSIS — E11.22 TYPE 2 DIABETES MELLITUS WITH CHRONIC KIDNEY DISEASE ON CHRONIC DIALYSIS, WITHOUT LONG-TERM CURRENT USE OF INSULIN (HCC): ICD-10-CM

## 2018-08-06 DIAGNOSIS — G40.909 SEIZURE DISORDER (HCC): ICD-10-CM

## 2018-08-06 DIAGNOSIS — E78.2 MIXED HYPERLIPIDEMIA: ICD-10-CM

## 2018-08-06 DIAGNOSIS — Z99.2 TYPE 2 DIABETES MELLITUS WITH CHRONIC KIDNEY DISEASE ON CHRONIC DIALYSIS, WITHOUT LONG-TERM CURRENT USE OF INSULIN (HCC): ICD-10-CM

## 2018-08-06 PROCEDURE — G8598 ASA/ANTIPLAT THER USED: HCPCS | Performed by: PHYSICIAN ASSISTANT

## 2018-08-06 PROCEDURE — 99214 OFFICE O/P EST MOD 30 MIN: CPT | Performed by: PHYSICIAN ASSISTANT

## 2018-08-06 PROCEDURE — 3044F HG A1C LEVEL LT 7.0%: CPT | Performed by: PHYSICIAN ASSISTANT

## 2018-08-06 PROCEDURE — 2022F DILAT RTA XM EVC RTNOPTHY: CPT | Performed by: PHYSICIAN ASSISTANT

## 2018-08-06 PROCEDURE — 1123F ACP DISCUSS/DSCN MKR DOCD: CPT | Performed by: PHYSICIAN ASSISTANT

## 2018-08-06 PROCEDURE — 3017F COLORECTAL CA SCREEN DOC REV: CPT | Performed by: PHYSICIAN ASSISTANT

## 2018-08-06 PROCEDURE — 1036F TOBACCO NON-USER: CPT | Performed by: PHYSICIAN ASSISTANT

## 2018-08-06 PROCEDURE — G8427 DOCREV CUR MEDS BY ELIG CLIN: HCPCS | Performed by: PHYSICIAN ASSISTANT

## 2018-08-06 PROCEDURE — 1101F PT FALLS ASSESS-DOCD LE1/YR: CPT | Performed by: PHYSICIAN ASSISTANT

## 2018-08-06 PROCEDURE — G8420 CALC BMI NORM PARAMETERS: HCPCS | Performed by: PHYSICIAN ASSISTANT

## 2018-08-06 PROCEDURE — 4040F PNEUMOC VAC/ADMIN/RCVD: CPT | Performed by: PHYSICIAN ASSISTANT

## 2018-08-06 RX ORDER — MIRTAZAPINE 45 MG/1
45 TABLET, FILM COATED ORAL NIGHTLY
Qty: 30 TABLET | Refills: 3 | Status: SHIPPED | OUTPATIENT
Start: 2018-08-06 | End: 2018-12-07 | Stop reason: SDUPTHER

## 2018-08-06 RX ORDER — ESCITALOPRAM OXALATE 10 MG/1
10 TABLET ORAL DAILY
Qty: 30 TABLET | Refills: 1 | Status: SHIPPED | OUTPATIENT
Start: 2018-08-06 | End: 2018-09-21 | Stop reason: SDUPTHER

## 2018-08-06 ASSESSMENT — PATIENT HEALTH QUESTIONNAIRE - PHQ9
1. LITTLE INTEREST OR PLEASURE IN DOING THINGS: 0
2. FEELING DOWN, DEPRESSED OR HOPELESS: 0
SUM OF ALL RESPONSES TO PHQ QUESTIONS 1-9: 0
SUM OF ALL RESPONSES TO PHQ9 QUESTIONS 1 & 2: 0

## 2018-08-06 NOTE — PROGRESS NOTES
[address]  [address]  [place] [address]  Dept: [phone]  Dept Fax: [phone]    Office Progress/Follow Up Note  Date of patient's visit: 8/6/2018  Patient's Name:  Jude Rice YOB: 1950            Patient Care Team:  Maurice Brasher MD as PCP - Ramandeep Wong MD as PCP - [facility] Attributed Provider  Maurice Brasher MD  ================================================================    REASON FOR VISIT/CHIEF COMPLAINT:  Hypertension and Medication Refill    HISTORY OF PRESENTING ILLNESS:  History was obtained from: patient. Jude Rice is a 69 y.o. is here follow for high blood pressure, GERD, HLD, seizures. Patient family members present. Patient states he is compliant with all medications. Patient denies any complaints in office. He was recently discharged from rehab facility. Patient is followed by nephrology for ESRD, does hemodialysis on Tuesday, Thursday, Saturday. Patient denies any recent seizure episodes. Patient is requesting medication refills, escitalopram looks like it was started in rehab facility, will contact pharmacy and get med list.  Patient weight is stable. Patient blood pressure stable in office. Labs reviewed. [address] gap diagnosis is review, stable. Medications reviewed, refilled Remeron 45 mg, Lexapro 10 mg. Informed patient there will be no changes to medication for high blood pressure, GERD, HLD, seizures, continue current treatments.     HPI    Patient Active Problem List   Diagnosis    Hypertension    Proximal myotonic myopathy ([address]    Unstable gait    Obesity    Lumbar spondylosis    Cerebral infarction ([address]    Lumbosacral ligament sprain    Diabetic neuropathy (HCC)    Normochromic normocytic anemia    Renal cell carcinoma (HCC)    Altered mental status    Dyspnea    Hyperkalemia    Kyphosis of thoracolumbar region    Schizoaffective disorder ([address]    Cerebral vascular disease    Left-sided weakness    Osteoarthritis of left shoulder    Mixed hyperlipidemia    Oropharyngeal dysphagia    Generalized weakness    ESRD on hemodialysis (HCC)    Suicidal ideation    Subtherapeutic serum dilantin level    Seizure disorder (HCC)    Frequent falls    Type 2 diabetes mellitus with chronic kidney disease on chronic dialysis, without long-term current use of insulin (HCC)    Vascular dementia without behavioral disturbance    Delirium due to another medical condition    Wheelchair dependent    Hypoxia    Encounter for palliative care    Pneumonia    Dehydration with hyponatremia    Anemia of chronic kidney failure       There are no preventive care reminders to display for this patient.     No Known Allergies      Current Outpatient Prescriptions   Medication Sig Dispense Refill    mirtazapine (REMERON) 45 MG tablet Take 1 tablet by mouth nightly 30 tablet 3    escitalopram (LEXAPRO) 10 MG tablet Take 1 tablet by mouth daily 30 tablet 1    albuterol (PROVENTIL) (2.5 MG/3ML) 0.083% nebulizer solution Take 3 mLs by nebulization every 2 hours as needed for Wheezing 120 each 3    ipratropium-albuterol (DUONEB) 0.5-2.5 (3) MG/3ML SOLN nebulizer solution Inhale 3 mLs into the lungs three times daily 360 mL     magnesium hydroxide (MILK OF MAGNESIA) 400 MG/5ML suspension Take 30 mLs by mouth daily as needed for Constipation      midodrine (PROAMATINE) 10 MG tablet Take 1 tablet by mouth as needed (for systolic blood pressure less than 100) 90 tablet 3    nicotine (NICODERM CQ) 21 MG/24HR Place 1 patch onto the skin daily as needed (if patient smokes/requests nicotine replacent therapy) 30 patch 3    omeprazole (PRILOSEC) 20 MG delayed release capsule TAKE 1 CAPSULE DAILY 30 capsule 0    folic acid (FOLVITE) 1 MG tablet TAKE 1 TABLET DAILY 28 tablet 3    levETIRAcetam (KEPPRA) 500 MG tablet Take 1 tablet by mouth 2 times daily 60 tablet 3    lisinopril (PRINIVIL;ZESTRIL) 10 MG tablet Take 1 tablet by mouth daily 30 tablet 3    metoprolol succinate (TOPROL XL) 50 MG extended release tablet Take 1 tablet by mouth nightly 30 tablet 3    acetaminophen (TYLENOL) 325 MG tablet Take 2 tablets by mouth every 6 hours as needed for Pain or Fever 120 tablet 3    ziprasidone (GEODON) 40 MG capsule Take 1 capsule by mouth 2 times daily (with meals) 60 capsule 3    amLODIPine (NORVASC) 5 MG tablet Take 1 tablet by mouth daily 30 tablet 3    clopidogrel (PLAVIX) 75 MG tablet TAKE 1 TABLET DAILY 28 tablet 0    simvastatin (ZOCOR) 10 MG tablet TAKE 1 TABLET AT BEDTIME 30 tablet 0    glucose blood VI test strips (TRUE METRIX BLOOD GLUCOSE TEST) strip 1 each by In Vitro route 3 times daily As needed. 100 each 5     No current facility-administered medications for this visit. Social History   Substance Use Topics    Smoking status: Never Smoker    Smokeless tobacco: Never Used    Alcohol use No       Family History   Problem Relation Age of Onset    Diabetes Mother     Stroke Mother     Coronary Art Dis Mother     Diabetes Father     No Known Problems Maternal Grandmother     No Known Problems Maternal Grandfather     No Known Problems Paternal Grandmother     No Known Problems Paternal Grandfather         REVIEW OF SYSTEMS:  Review of Systems   Constitutional: Negative for chills and fever. HENT: Negative for congestion. Respiratory: Negative for cough and shortness of breath. Cardiovascular: Negative for chest pain. Gastrointestinal: Negative for constipation, diarrhea, nausea and vomiting. Genitourinary: Negative for dysuria, frequency and urgency. Musculoskeletal: Negative for back pain, joint pain, myalgias and neck pain. Neurological: Negative for headaches.        PHYSICAL EXAM:  Vitals:    08/06/18 1446   BP: 135/71   Site: Right Arm   Position: Sitting   Cuff Size: Medium Adult   Pulse: 78   Resp: 20   Temp: 98.2 °F (36.8 °C) TempSrc: Oral   SpO2: 97%   Weight: 156 lb (70.8 kg)   Height: 5' 9\" (1.753 m)     BP Readings from Last 3 Encounters:   08/06/18 135/71   05/19/18 (!) 139/58   02/14/18 (!) 128/44        Physical Exam   Constitutional: He is oriented to person, place, and time and well-developed, well-nourished, and in no distress. HENT:   Head: Normocephalic and atraumatic. Eyes: Pupils are equal, round, and reactive to light. Cardiovascular: Normal rate, regular rhythm and normal heart sounds. Pulmonary/Chest: Effort normal and breath sounds normal.   Abdominal: Soft. He exhibits no mass. There is no tenderness. Musculoskeletal: He exhibits no tenderness. Neurological: He is alert and oriented to person, place, and time. Gait abnormal.   Skin: Skin is warm and dry. Vitals reviewed. DIAGNOSTIC FINDINGS:  CBC:  Lab Results   Component Value Date    WBC 4.0 05/19/2018    HGB 11.9 05/19/2018    PLT 65 05/19/2018     05/22/2012       BMP:    Lab Results   Component Value Date     05/24/2018    K 5.1 07/21/2018    K 5.2 05/24/2018    CL 94 05/24/2018    CO2 26 05/24/2018    BUN 61 05/24/2018    CREATININE 5.93 05/24/2018    GLUCOSE 99 05/24/2018    GLUCOSE 77 05/22/2012       HEMOGLOBIN A1C:   Lab Results   Component Value Date    LABA1C 4.4 05/24/2018       FASTING LIPID PANEL:  Lab Results   Component Value Date    CHOL 79 05/24/2018    HDL 40 (L) 05/24/2018    TRIG 41 05/24/2018       ASSESSMENT AND PLAN:  Alfred Castro was seen today for hypertension and medication refill. Diagnoses and all orders for this visit:    Essential hypertension    Type 2 diabetes mellitus with chronic kidney disease on chronic dialysis, without long-term current use of insulin (HCC)    Seizure disorder (HCC)    ESRD on hemodialysis ([facility]    Mixed hyperlipidemia    Schizoaffective disorder, depressive type ([facility]  -     mirtazapine (REMERON) 45 MG tablet;  Take 1 tablet by mouth nightly  -     escitalopram (LEXAPRO) 10 MG tablet; Take 1 tablet by mouth daily    Vascular dementia without behavioral disturbance    Chronic kidney disease, stage IV (severe) ([facility]      FOLLOW UP AND INSTRUCTIONS:  Return in about 4 months (around 12/6/2018) for HTN, Psych, DM, HLD, GERD, Seizures. · Discussed use, benefit, and side effects of prescribed medications. Barriers to medication compliance addressed. All patient questions answered. Pt voiced understanding. · Patient given educational materials - see patient instructions    Electronically signed by Roshan Saini PA-C on 8/6/18 at 3:06 PM    This note is created with the assistance of a speech-recognition program. While intending to generate a document that actually reflects the content of the visit, the document can still have some mistakes which may not have been identified and corrected by editing.

## 2018-08-06 NOTE — PROGRESS NOTES
Visit Information    Have you changed or started any medications since your last visit including any over-the-counter medicines, vitamins, or herbal medicines? no   Have you stopped taking any of your medications? Is so, why? -  no  Are you having any side effects from any of your medications? - no    Have you seen any other physician or provider since your last visit?  no   Have you had any other diagnostic tests since your last visit?  no   Have you been seen in the emergency room and/or had an admission in a hospital since we last saw you?  no   Have you had your routine dental cleaning in the past 6 months?  no     Do you have an active MyChart account? If no, what is the barrier?   Yes    Patient Care Team:  Ailyn Mortensen MD as PCP - Nestor Anne MD as PCP - [facility] Attributed Provider  Ailyn Mortensen MD    Medical History Review  Past Medical, Family, and Social History reviewed and does not contribute to the patient presenting condition    Health Maintenance   Topic Date Due    Pneumococcal high/highest risk  Completed

## 2018-08-08 PROBLEM — N18.6 ESRD (END STAGE RENAL DISEASE) ON DIALYSIS (HCC): Status: RESOLVED | Noted: 2018-05-15 | Resolved: 2018-08-08

## 2018-08-08 PROBLEM — Z99.2 ESRD (END STAGE RENAL DISEASE) ON DIALYSIS (HCC): Status: RESOLVED | Noted: 2018-05-15 | Resolved: 2018-08-08

## 2018-08-08 ASSESSMENT — ENCOUNTER SYMPTOMS
DIARRHEA: 0
BACK PAIN: 0
COUGH: 0
SHORTNESS OF BREATH: 0
CONSTIPATION: 0
VOMITING: 0
NAUSEA: 0

## 2018-08-10 DIAGNOSIS — I10 ESSENTIAL HYPERTENSION: Primary | ICD-10-CM

## 2018-08-10 RX ORDER — AMLODIPINE BESYLATE 5 MG/1
5 TABLET ORAL DAILY
Qty: 30 TABLET | Refills: 3 | Status: SHIPPED | OUTPATIENT
Start: 2018-08-10 | End: 2018-12-13 | Stop reason: SDUPTHER

## 2018-08-10 RX ORDER — LISINOPRIL 10 MG/1
10 TABLET ORAL DAILY
Qty: 30 TABLET | Refills: 3 | Status: SHIPPED | OUTPATIENT
Start: 2018-08-10 | End: 2018-12-13 | Stop reason: SDUPTHER

## 2018-08-14 DIAGNOSIS — R11.0 NAUSEA: ICD-10-CM

## 2018-08-14 NOTE — TELEPHONE ENCOUNTER
dependent     Hypoxia     Encounter for palliative care     Pneumonia     Dehydration with hyponatremia     Anemia of chronic kidney failure

## 2018-08-15 RX ORDER — CLOPIDOGREL BISULFATE 75 MG/1
TABLET ORAL
Qty: 30 TABLET | Refills: 3 | Status: SHIPPED | OUTPATIENT
Start: 2018-08-15 | End: 2018-12-13 | Stop reason: SDUPTHER

## 2018-08-15 RX ORDER — OMEPRAZOLE 20 MG/1
CAPSULE, DELAYED RELEASE ORAL
Qty: 30 CAPSULE | Refills: 3 | Status: SHIPPED | OUTPATIENT
Start: 2018-08-15 | End: 2018-11-01 | Stop reason: SDUPTHER

## 2018-08-15 RX ORDER — METOPROLOL SUCCINATE 50 MG/1
50 TABLET, EXTENDED RELEASE ORAL NIGHTLY
Qty: 30 TABLET | Refills: 3 | Status: SHIPPED | OUTPATIENT
Start: 2018-08-15 | End: 2018-12-19 | Stop reason: SDUPTHER

## 2018-08-15 RX ORDER — SIMVASTATIN 10 MG
TABLET ORAL
Qty: 30 TABLET | Refills: 3 | Status: SHIPPED | OUTPATIENT
Start: 2018-08-15 | End: 2018-12-19 | Stop reason: SDUPTHER

## 2018-08-15 RX ORDER — LEVETIRACETAM 500 MG/1
500 TABLET ORAL 2 TIMES DAILY
Qty: 60 TABLET | Refills: 3 | Status: SHIPPED | OUTPATIENT
Start: 2018-08-15 | End: 2018-12-13 | Stop reason: SDUPTHER

## 2018-08-28 ENCOUNTER — OFFICE VISIT (OUTPATIENT)
Dept: FAMILY MEDICINE CLINIC | Age: 68
End: 2018-08-28
Payer: MEDICARE

## 2018-08-28 VITALS
TEMPERATURE: 94.5 F | SYSTOLIC BLOOD PRESSURE: 150 MMHG | HEART RATE: 81 BPM | DIASTOLIC BLOOD PRESSURE: 76 MMHG | OXYGEN SATURATION: 96 %

## 2018-08-28 DIAGNOSIS — R11.10 ACUTE VOMITING: Primary | ICD-10-CM

## 2018-08-28 DIAGNOSIS — I10 ESSENTIAL HYPERTENSION: ICD-10-CM

## 2018-08-28 DIAGNOSIS — Z99.3 WHEELCHAIR DEPENDENT: ICD-10-CM

## 2018-08-28 DIAGNOSIS — K21.9 GASTROESOPHAGEAL REFLUX DISEASE, ESOPHAGITIS PRESENCE NOT SPECIFIED: ICD-10-CM

## 2018-08-28 PROCEDURE — G8427 DOCREV CUR MEDS BY ELIG CLIN: HCPCS | Performed by: PHYSICIAN ASSISTANT

## 2018-08-28 PROCEDURE — 3017F COLORECTAL CA SCREEN DOC REV: CPT | Performed by: PHYSICIAN ASSISTANT

## 2018-08-28 PROCEDURE — 1101F PT FALLS ASSESS-DOCD LE1/YR: CPT | Performed by: PHYSICIAN ASSISTANT

## 2018-08-28 PROCEDURE — G8598 ASA/ANTIPLAT THER USED: HCPCS | Performed by: PHYSICIAN ASSISTANT

## 2018-08-28 PROCEDURE — 4040F PNEUMOC VAC/ADMIN/RCVD: CPT | Performed by: PHYSICIAN ASSISTANT

## 2018-08-28 PROCEDURE — 99214 OFFICE O/P EST MOD 30 MIN: CPT | Performed by: PHYSICIAN ASSISTANT

## 2018-08-28 PROCEDURE — 1036F TOBACCO NON-USER: CPT | Performed by: PHYSICIAN ASSISTANT

## 2018-08-28 PROCEDURE — G8420 CALC BMI NORM PARAMETERS: HCPCS | Performed by: PHYSICIAN ASSISTANT

## 2018-08-28 PROCEDURE — 1123F ACP DISCUSS/DSCN MKR DOCD: CPT | Performed by: PHYSICIAN ASSISTANT

## 2018-08-28 RX ORDER — ONDANSETRON 4 MG/1
4 TABLET, FILM COATED ORAL DAILY PRN
Qty: 30 TABLET | Refills: 1 | Status: SHIPPED | OUTPATIENT
Start: 2018-08-28

## 2018-08-28 ASSESSMENT — ENCOUNTER SYMPTOMS: VOMITING: 1

## 2018-08-28 NOTE — PROGRESS NOTES
([facility]    Normochromic normocytic anemia    Renal cell carcinoma (HCC)    Altered mental status    Dyspnea    Hyperkalemia    Kyphosis of thoracolumbar region    Schizoaffective disorder (HCC)    Cerebral vascular disease    Left-sided weakness    Osteoarthritis of left shoulder    Mixed hyperlipidemia    Oropharyngeal dysphagia    Generalized weakness    ESRD on hemodialysis (HCC)    Suicidal ideation    Subtherapeutic serum dilantin level    Seizure disorder (HCC)    Frequent falls    Type 2 diabetes mellitus with chronic kidney disease on chronic dialysis, without long-term current use of insulin (HCC)    Vascular dementia without behavioral disturbance    Delirium due to another medical condition    Wheelchair dependent    Hypoxia    Encounter for palliative care    Pneumonia    Dehydration with hyponatremia    Anemia of chronic kidney failure       There are no preventive care reminders to display for this patient.     No Known Allergies      Current Outpatient Prescriptions   Medication Sig Dispense Refill    ondansetron (ZOFRAN) 4 MG tablet Take 1 tablet by mouth daily as needed for Vomiting 30 tablet 1    metoprolol succinate (TOPROL XL) 50 MG extended release tablet Take 1 tablet by mouth nightly 30 tablet 3    levETIRAcetam (KEPPRA) 500 MG tablet Take 1 tablet by mouth 2 times daily 60 tablet 3    omeprazole (PRILOSEC) 20 MG delayed release capsule TAKE 1 CAPSULE DAILY 30 capsule 3    clopidogrel (PLAVIX) 75 MG tablet TAKE 1 TABLET DAILY 30 tablet 3    simvastatin (ZOCOR) 10 MG tablet TAKE 1 TABLET AT BEDTIME 30 tablet 3    lisinopril (PRINIVIL;ZESTRIL) 10 MG tablet Take 1 tablet by mouth daily 30 tablet 3    amLODIPine (NORVASC) 5 MG tablet Take 1 tablet by mouth daily 30 tablet 3    mirtazapine (REMERON) 45 MG tablet Take 1 tablet by mouth nightly 30 tablet 3    escitalopram (LEXAPRO) 10 MG tablet Take 1 tablet by mouth daily 30 tablet 1    albuterol (PROVENTIL) (2.5 frequency and urgency. Musculoskeletal: Negative for back pain, joint pain, myalgias and neck pain. Neurological: Negative for headaches. PHYSICAL EXAM:  Vitals:    08/28/18 1745 08/28/18 1748   BP: (!) 152/82 (!) 150/76   Site: Right Arm    Position: Sitting    Cuff Size: Medium Adult    Pulse:  81   Temp: 94.5 °F (34.7 °C)    TempSrc: Tympanic    SpO2: 99% 96%     BP Readings from Last 3 Encounters:   08/28/18 (!) 150/76   08/06/18 135/71   05/19/18 (!) 139/58        Physical Exam   Constitutional: He is oriented to person, place, and time and well-developed, well-nourished, and in no distress. HENT:   Head: Normocephalic and atraumatic. Eyes: Pupils are equal, round, and reactive to light. Cardiovascular: Normal rate, regular rhythm and normal heart sounds. Pulmonary/Chest: Effort normal and breath sounds normal.   Abdominal: Soft. He exhibits no mass. There is no tenderness. Musculoskeletal: He exhibits no tenderness. Neurological: He is alert and oriented to person, place, and time. Skin: Skin is warm and dry. DIAGNOSTIC FINDINGS:  CBC:  Lab Results   Component Value Date    WBC 4.0 05/19/2018    HGB 11.9 05/19/2018    PLT 65 05/19/2018     05/22/2012       BMP:    Lab Results   Component Value Date     05/24/2018    K 5.1 07/21/2018    K 5.2 05/24/2018    CL 94 05/24/2018    CO2 26 05/24/2018    BUN 61 05/24/2018    CREATININE 5.93 05/24/2018    GLUCOSE 99 05/24/2018    GLUCOSE 77 05/22/2012       HEMOGLOBIN A1C:   Lab Results   Component Value Date    LABA1C 4.4 05/24/2018       FASTING LIPID PANEL:  Lab Results   Component Value Date    CHOL 79 05/24/2018    HDL 40 (L) 05/24/2018    TRIG 41 05/24/2018       ASSESSMENT AND PLAN:  Troy Jesus was seen today for anorexia, emesis and hypertension. Diagnoses and all orders for this visit:    Acute vomiting  -     ondansetron (ZOFRAN) 4 MG tablet;  Take 1 tablet by mouth daily as needed for Vomiting  -     [address], Janis Rivas MD, Gastroenterology [facility]    Gastroesophageal reflux disease, esophagitis presence not specified  -     Rick Kramer MD, Gastroenterology [facility]    Essential hypertension    Wheelchair dependent      FOLLOW UP AND INSTRUCTIONS:  Return in about 3 months (around 11/28/2018) for GERD, HLD, HTN, Psych. · Discussed use, benefit, and side effects of prescribed medications. Barriers to medication compliance addressed. All patient questions answered. Pt voiced understanding. · Patient given educational materials - see patient instructions    Electronically signed by Robin Haywood PA-C on 8/28/18 at 6:02 PM    This note is created with the assistance of a speech-recognition program. While intending to generate a document that actually reflects the content of the visit, the document can still have some mistakes which may not have been identified and corrected by editing.

## 2018-09-01 ASSESSMENT — ENCOUNTER SYMPTOMS
CONSTIPATION: 0
SHORTNESS OF BREATH: 0
BACK PAIN: 0
NAUSEA: 0
DIARRHEA: 0
COUGH: 0

## 2018-10-15 ENCOUNTER — TELEPHONE (OUTPATIENT)
Dept: FAMILY MEDICINE CLINIC | Age: 68
End: 2018-10-15

## 2018-11-07 DIAGNOSIS — R11.0 NAUSEA: ICD-10-CM

## 2018-11-11 RX ORDER — OMEPRAZOLE 20 MG/1
CAPSULE, DELAYED RELEASE ORAL
Qty: 30 CAPSULE | Refills: 0 | Status: SHIPPED | OUTPATIENT
Start: 2018-11-11 | End: 2018-12-13 | Stop reason: SDUPTHER

## 2018-11-28 ENCOUNTER — HOSPITAL ENCOUNTER (OUTPATIENT)
Dept: VASCULAR LAB | Age: 68
Discharge: HOME | End: 2018-11-28
Payer: MEDICARE

## 2018-11-28 PROCEDURE — 93923 UPR/LXTR ART STDY 3+ LVLS: CPT

## 2018-12-07 DIAGNOSIS — F25.1 SCHIZOAFFECTIVE DISORDER, DEPRESSIVE TYPE (HCC): ICD-10-CM

## 2018-12-07 RX ORDER — MIRTAZAPINE 45 MG/1
45 TABLET, FILM COATED ORAL NIGHTLY
Qty: 30 TABLET | Refills: 1 | Status: SHIPPED | OUTPATIENT
Start: 2018-12-07 | End: 2019-02-25 | Stop reason: SDUPTHER

## 2018-12-13 DIAGNOSIS — R11.0 NAUSEA: ICD-10-CM

## 2018-12-13 DIAGNOSIS — I10 ESSENTIAL HYPERTENSION: ICD-10-CM

## 2018-12-13 DIAGNOSIS — F25.1 SCHIZOAFFECTIVE DISORDER, DEPRESSIVE TYPE (HCC): ICD-10-CM

## 2018-12-13 RX ORDER — OMEPRAZOLE 20 MG/1
CAPSULE, DELAYED RELEASE ORAL
Qty: 30 CAPSULE | Refills: 1 | Status: SHIPPED | OUTPATIENT
Start: 2018-12-13 | End: 2019-02-25 | Stop reason: SDUPTHER

## 2018-12-13 RX ORDER — LEVETIRACETAM 500 MG/1
500 TABLET ORAL 2 TIMES DAILY
Qty: 60 TABLET | Refills: 1 | Status: SHIPPED | OUTPATIENT
Start: 2018-12-13 | End: 2019-03-07 | Stop reason: SDUPTHER

## 2018-12-13 RX ORDER — AMLODIPINE BESYLATE 5 MG/1
5 TABLET ORAL DAILY
Qty: 30 TABLET | Refills: 1 | Status: SHIPPED | OUTPATIENT
Start: 2018-12-13 | End: 2019-02-25 | Stop reason: SDUPTHER

## 2018-12-13 RX ORDER — LISINOPRIL 10 MG/1
10 TABLET ORAL DAILY
Qty: 30 TABLET | Refills: 1 | Status: SHIPPED | OUTPATIENT
Start: 2018-12-13 | End: 2019-03-07 | Stop reason: SDUPTHER

## 2018-12-13 RX ORDER — ESCITALOPRAM OXALATE 10 MG/1
TABLET ORAL
Qty: 30 TABLET | Refills: 1 | Status: SHIPPED | OUTPATIENT
Start: 2018-12-13 | End: 2019-03-07 | Stop reason: SDUPTHER

## 2018-12-13 RX ORDER — CLOPIDOGREL BISULFATE 75 MG/1
TABLET ORAL
Qty: 30 TABLET | Refills: 1 | Status: SHIPPED | OUTPATIENT
Start: 2018-12-13 | End: 2019-03-07 | Stop reason: SDUPTHER

## 2018-12-13 NOTE — TELEPHONE ENCOUNTER
Encounter for palliative care     Pneumonia     Dehydration with hyponatremia     Anemia of chronic kidney failure

## 2018-12-20 RX ORDER — METOPROLOL SUCCINATE 50 MG/1
50 TABLET, EXTENDED RELEASE ORAL NIGHTLY
Qty: 30 TABLET | Refills: 0 | Status: SHIPPED | OUTPATIENT
Start: 2018-12-20 | End: 2019-02-20 | Stop reason: SDUPTHER

## 2018-12-20 RX ORDER — SIMVASTATIN 10 MG
TABLET ORAL
Qty: 30 TABLET | Refills: 0 | Status: SHIPPED | OUTPATIENT
Start: 2018-12-20 | End: 2019-02-20 | Stop reason: SDUPTHER

## 2019-02-10 ENCOUNTER — TELEPHONE (OUTPATIENT)
Dept: PRIMARY CARE CLINIC | Age: 69
End: 2019-02-10

## 2019-02-25 ENCOUNTER — OFFICE VISIT (OUTPATIENT)
Dept: PRIMARY CARE CLINIC | Age: 69
End: 2019-02-25
Payer: MEDICARE

## 2019-02-25 VITALS
BODY MASS INDEX: 22.51 KG/M2 | HEIGHT: 69 IN | DIASTOLIC BLOOD PRESSURE: 68 MMHG | HEART RATE: 89 BPM | WEIGHT: 152 LBS | SYSTOLIC BLOOD PRESSURE: 110 MMHG

## 2019-02-25 DIAGNOSIS — F01.50 VASCULAR DEMENTIA WITHOUT BEHAVIORAL DISTURBANCE (HCC): ICD-10-CM

## 2019-02-25 DIAGNOSIS — G40.909 SEIZURE DISORDER (HCC): ICD-10-CM

## 2019-02-25 DIAGNOSIS — Z99.2 ESRD ON HEMODIALYSIS (HCC): ICD-10-CM

## 2019-02-25 DIAGNOSIS — Z76.0 MEDICATION REFILL: Primary | ICD-10-CM

## 2019-02-25 DIAGNOSIS — I10 ESSENTIAL HYPERTENSION: ICD-10-CM

## 2019-02-25 DIAGNOSIS — R11.0 NAUSEA: ICD-10-CM

## 2019-02-25 DIAGNOSIS — N18.6 ESRD ON HEMODIALYSIS (HCC): ICD-10-CM

## 2019-02-25 DIAGNOSIS — F25.1 SCHIZOAFFECTIVE DISORDER, DEPRESSIVE TYPE (HCC): ICD-10-CM

## 2019-02-25 PROCEDURE — 1101F PT FALLS ASSESS-DOCD LE1/YR: CPT | Performed by: PHYSICIAN ASSISTANT

## 2019-02-25 PROCEDURE — 1036F TOBACCO NON-USER: CPT | Performed by: PHYSICIAN ASSISTANT

## 2019-02-25 PROCEDURE — G8427 DOCREV CUR MEDS BY ELIG CLIN: HCPCS | Performed by: PHYSICIAN ASSISTANT

## 2019-02-25 PROCEDURE — G8484 FLU IMMUNIZE NO ADMIN: HCPCS | Performed by: PHYSICIAN ASSISTANT

## 2019-02-25 PROCEDURE — 4040F PNEUMOC VAC/ADMIN/RCVD: CPT | Performed by: PHYSICIAN ASSISTANT

## 2019-02-25 PROCEDURE — 1123F ACP DISCUSS/DSCN MKR DOCD: CPT | Performed by: PHYSICIAN ASSISTANT

## 2019-02-25 PROCEDURE — 99214 OFFICE O/P EST MOD 30 MIN: CPT | Performed by: PHYSICIAN ASSISTANT

## 2019-02-25 PROCEDURE — 3017F COLORECTAL CA SCREEN DOC REV: CPT | Performed by: PHYSICIAN ASSISTANT

## 2019-02-25 PROCEDURE — G8420 CALC BMI NORM PARAMETERS: HCPCS | Performed by: PHYSICIAN ASSISTANT

## 2019-02-25 PROCEDURE — G8598 ASA/ANTIPLAT THER USED: HCPCS | Performed by: PHYSICIAN ASSISTANT

## 2019-02-25 RX ORDER — ZIPRASIDONE HYDROCHLORIDE 40 MG/1
40 CAPSULE ORAL 2 TIMES DAILY WITH MEALS
Qty: 60 CAPSULE | Refills: 3 | Status: SHIPPED | OUTPATIENT
Start: 2019-02-25 | End: 2019-06-13 | Stop reason: SDUPTHER

## 2019-02-25 RX ORDER — AMLODIPINE BESYLATE 5 MG/1
5 TABLET ORAL DAILY
Qty: 30 TABLET | Refills: 3 | Status: SHIPPED | OUTPATIENT
Start: 2019-02-25 | End: 2019-03-07 | Stop reason: SDUPTHER

## 2019-02-25 RX ORDER — SEVELAMER CARBONATE 800 MG/1
TABLET, FILM COATED ORAL
Refills: 6 | Status: ON HOLD | COMMUNITY
Start: 2019-01-28 | End: 2019-07-05 | Stop reason: HOSPADM

## 2019-02-25 RX ORDER — FOLIC ACID 1 MG/1
1 TABLET ORAL DAILY
Qty: 30 TABLET | Refills: 3 | Status: SHIPPED | OUTPATIENT
Start: 2019-02-25 | End: 2019-06-04 | Stop reason: SDUPTHER

## 2019-02-25 RX ORDER — OMEPRAZOLE 20 MG/1
20 CAPSULE, DELAYED RELEASE ORAL DAILY
Qty: 30 CAPSULE | Refills: 3 | Status: SHIPPED | OUTPATIENT
Start: 2019-02-25 | End: 2019-03-07 | Stop reason: SDUPTHER

## 2019-02-25 RX ORDER — MIRTAZAPINE 45 MG/1
45 TABLET, FILM COATED ORAL NIGHTLY
Qty: 30 TABLET | Refills: 3 | Status: SHIPPED | OUTPATIENT
Start: 2019-02-25

## 2019-02-25 RX ORDER — ACETAMINOPHEN 325 MG/1
650 TABLET ORAL EVERY 6 HOURS PRN
Qty: 120 TABLET | Refills: 3 | Status: SHIPPED | OUTPATIENT
Start: 2019-02-25 | End: 2019-06-20 | Stop reason: SDUPTHER

## 2019-02-25 ASSESSMENT — PATIENT HEALTH QUESTIONNAIRE - PHQ9
1. LITTLE INTEREST OR PLEASURE IN DOING THINGS: 1
SUM OF ALL RESPONSES TO PHQ QUESTIONS 1-9: 1
SUM OF ALL RESPONSES TO PHQ QUESTIONS 1-9: 1
SUM OF ALL RESPONSES TO PHQ9 QUESTIONS 1 & 2: 1
2. FEELING DOWN, DEPRESSED OR HOPELESS: 0

## 2019-02-28 ENCOUNTER — TELEPHONE (OUTPATIENT)
Dept: PRIMARY CARE CLINIC | Age: 69
End: 2019-02-28

## 2019-03-02 ENCOUNTER — TELEPHONE (OUTPATIENT)
Dept: PRIMARY CARE CLINIC | Age: 69
End: 2019-03-02

## 2019-03-02 PROBLEM — E11.22 TYPE 2 DIABETES MELLITUS WITH CHRONIC KIDNEY DISEASE ON CHRONIC DIALYSIS, WITHOUT LONG-TERM CURRENT USE OF INSULIN (HCC): Status: RESOLVED | Noted: 2017-06-19 | Resolved: 2019-03-02

## 2019-03-02 PROBLEM — N18.6 TYPE 2 DIABETES MELLITUS WITH CHRONIC KIDNEY DISEASE ON CHRONIC DIALYSIS, WITHOUT LONG-TERM CURRENT USE OF INSULIN (HCC): Status: RESOLVED | Noted: 2017-06-19 | Resolved: 2019-03-02

## 2019-03-02 PROBLEM — Z99.2 TYPE 2 DIABETES MELLITUS WITH CHRONIC KIDNEY DISEASE ON CHRONIC DIALYSIS, WITHOUT LONG-TERM CURRENT USE OF INSULIN (HCC): Status: RESOLVED | Noted: 2017-06-19 | Resolved: 2019-03-02

## 2019-03-02 ASSESSMENT — ENCOUNTER SYMPTOMS
DIARRHEA: 0
NAUSEA: 0
WHEEZING: 0
CONSTIPATION: 0
VOMITING: 0
COUGH: 0
SHORTNESS OF BREATH: 0
BACK PAIN: 0

## 2019-03-07 DIAGNOSIS — R11.0 NAUSEA: ICD-10-CM

## 2019-03-07 DIAGNOSIS — F25.1 SCHIZOAFFECTIVE DISORDER, DEPRESSIVE TYPE (HCC): ICD-10-CM

## 2019-03-07 DIAGNOSIS — I10 ESSENTIAL HYPERTENSION: ICD-10-CM

## 2019-03-10 RX ORDER — ESCITALOPRAM OXALATE 10 MG/1
10 TABLET ORAL DAILY
Qty: 30 TABLET | Refills: 1 | Status: SHIPPED | OUTPATIENT
Start: 2019-03-10 | End: 2019-03-11 | Stop reason: SDUPTHER

## 2019-03-10 RX ORDER — LISINOPRIL 10 MG/1
10 TABLET ORAL DAILY
Qty: 30 TABLET | Refills: 2 | Status: SHIPPED | OUTPATIENT
Start: 2019-03-10 | End: 2019-03-11 | Stop reason: SDUPTHER

## 2019-03-10 RX ORDER — CLOPIDOGREL BISULFATE 75 MG/1
75 TABLET ORAL DAILY
Qty: 30 TABLET | Refills: 2 | Status: SHIPPED | OUTPATIENT
Start: 2019-03-10 | End: 2019-03-11 | Stop reason: SDUPTHER

## 2019-03-10 RX ORDER — OMEPRAZOLE 20 MG/1
20 CAPSULE, DELAYED RELEASE ORAL DAILY
Qty: 30 CAPSULE | Refills: 2 | Status: ON HOLD | OUTPATIENT
Start: 2019-03-10 | End: 2019-07-05 | Stop reason: HOSPADM

## 2019-03-10 RX ORDER — LEVETIRACETAM 500 MG/1
500 TABLET ORAL 2 TIMES DAILY
Qty: 60 TABLET | Refills: 2 | Status: SHIPPED | OUTPATIENT
Start: 2019-03-10 | End: 2019-03-11 | Stop reason: SDUPTHER

## 2019-03-10 RX ORDER — AMLODIPINE BESYLATE 5 MG/1
5 TABLET ORAL DAILY
Qty: 30 TABLET | Refills: 2 | Status: ON HOLD | OUTPATIENT
Start: 2019-03-10 | End: 2019-07-05 | Stop reason: HOSPADM

## 2019-04-01 ENCOUNTER — TELEPHONE (OUTPATIENT)
Dept: GASTROENTEROLOGY | Age: 69
End: 2019-04-01

## 2019-04-01 NOTE — TELEPHONE ENCOUNTER
Patient no showed on 3/28/19 Dr Betts  [facility] for patient to call office to R/S 1st attempt from no show  Sending back to PCP

## 2019-05-13 RX ORDER — SODIUM CHLORIDE 0.9 % (FLUSH) 0.9 %
10 SYRINGE (ML) INJECTION 2 TIMES DAILY
Status: CANCELLED | OUTPATIENT
Start: 2019-05-14

## 2019-05-13 RX ORDER — SODIUM CHLORIDE 9 MG/ML
INJECTION, SOLUTION INTRAVENOUS CONTINUOUS
Status: CANCELLED | OUTPATIENT
Start: 2019-05-13

## 2019-05-28 DIAGNOSIS — E11.9 DM (DIABETES MELLITUS), TYPE 2 (HCC): ICD-10-CM

## 2019-05-28 RX ORDER — LANCETS 30 GAUGE
EACH MISCELLANEOUS
Qty: 100 EACH | Refills: 5 | Status: ON HOLD | OUTPATIENT
Start: 2019-05-28 | End: 2019-07-05 | Stop reason: HOSPADM

## 2019-06-13 DIAGNOSIS — F25.1 SCHIZOAFFECTIVE DISORDER, DEPRESSIVE TYPE (HCC): ICD-10-CM

## 2019-06-13 RX ORDER — ZIPRASIDONE HYDROCHLORIDE 40 MG/1
40 CAPSULE ORAL 2 TIMES DAILY WITH MEALS
Qty: 60 CAPSULE | Refills: 2 | Status: ON HOLD | OUTPATIENT
Start: 2019-06-13 | End: 2019-07-05 | Stop reason: HOSPADM

## 2019-06-13 NOTE — TELEPHONE ENCOUNTER
Last visit: 02/15/2019  Last Med refill: 05/18/2019  Does patient have enough medication for 72 hours: Yes    Next Visit Date:  Future Appointments   Date Time Provider Vidhya Odom   6/24/2019  2:00 PM Natalie Canavan, PA-C [facility] Maintenance   Topic Date Due    A1C test (Diabetic or Prediabetic)  05/24/2019    Hepatitis B Vaccine  Completed    Flu vaccine  Completed    Pneumococcal 65+ years Vaccine  Completed    HPV vaccine  Aged Out       Hemoglobin A1C (%)   Date Value   05/24/2018 4.4   02/14/2017 5.0   11/08/2016 4.9             ( goal A1C is < 7)   No results found for: LABMICR  LDL Cholesterol (mg/dL)   Date Value   05/24/2018 31   07/16/2015 47     LDL Calculated (mg/dL)   Date Value   02/14/2017 37   11/08/2016 47       (goal LDL is <100)   AST (U/L)   Date Value   05/24/2018 30     ALT (U/L)   Date Value   05/24/2018 26     BUN (mg/dL)   Date Value   05/24/2018 61 (H)     BP Readings from Last 3 Encounters:   05/14/19 129/71   02/25/19 110/68   08/28/18 (!) 150/76          (goal 120/80)    All Future Testing planned in [facility]  Lab Frequency Next Occurrence               Patient Active Problem List:     Hypertension     Lumbar spondylosis     Cerebral infarction (HCC)     Lumbosacral ligament sprain     Normochromic normocytic anemia     Renal cell carcinoma (HCC)     Altered mental status     Dyspnea     Hyperkalemia     Kyphosis of thoracolumbar region     Schizoaffective disorder ([address]     Cerebral vascular disease     Left-sided weakness     Osteoarthritis of left shoulder     Mixed hyperlipidemia     Oropharyngeal dysphagia     Generalized weakness     ESRD on hemodialysis (HCC)     Suicidal ideation     Subtherapeutic serum dilantin level     Seizure disorder (HCC)     Frequent falls     Vascular dementia without behavioral disturbance     Delirium due to another medical condition     Wheelchair dependent     Hypoxia     Encounter for palliative care Pneumonia     Dehydration with hyponatremia     Anemia of chronic kidney failure

## 2019-06-20 DIAGNOSIS — Z76.0 MEDICATION REFILL: ICD-10-CM

## 2019-06-20 RX ORDER — ACETAMINOPHEN 325 MG/1
650 TABLET ORAL EVERY 6 HOURS PRN
Qty: 120 TABLET | Refills: 2 | Status: ON HOLD | OUTPATIENT
Start: 2019-06-20 | End: 2019-07-05 | Stop reason: HOSPADM

## 2019-06-20 NOTE — TELEPHONE ENCOUNTER
Last visit: 02/25/2019  Last Med refill: 05/23/2019  Does patient have enough medication for 72 hours: Yes    Next Visit Date:  Future Appointments   Date Time Provider Vidhya Odom   6/24/2019  2:00 PM Louise Moyer PA-C [facility] Maintenance   Topic Date Due    A1C test (Diabetic or Prediabetic)  05/24/2019    Annual Wellness Visit (AWV)  06/16/2020    Hepatitis B Vaccine  Completed    Flu vaccine  Completed    Pneumococcal 65+ years Vaccine  Completed    HPV vaccine  Aged Out       Hemoglobin A1C (%)   Date Value   05/24/2018 4.4   02/14/2017 5.0   11/08/2016 4.9             ( goal A1C is < 7)   No results found for: LABMICR  LDL Cholesterol (mg/dL)   Date Value   05/24/2018 31   07/16/2015 47     LDL Calculated (mg/dL)   Date Value   02/14/2017 37   11/08/2016 47       (goal LDL is <100)   AST (U/L)   Date Value   05/24/2018 30     ALT (U/L)   Date Value   05/24/2018 26     BUN (mg/dL)   Date Value   05/24/2018 61 (H)     BP Readings from Last 3 Encounters:   05/14/19 129/71   02/25/19 110/68   08/28/18 (!) 150/76          (goal 120/80)    All Future Testing planned in [facility]  Lab Frequency Next Occurrence               Patient Active Problem List:     Hypertension     Lumbar spondylosis     Cerebral infarction (HCC)     Lumbosacral ligament sprain     Normochromic normocytic anemia     Renal cell carcinoma (HCC)     Altered mental status     Dyspnea     Hyperkalemia     Kyphosis of thoracolumbar region     Schizoaffective disorder (HCC)     Cerebral vascular disease     Left-sided weakness     Osteoarthritis of left shoulder     Mixed hyperlipidemia     Oropharyngeal dysphagia     Generalized weakness     ESRD on hemodialysis (HCC)     Suicidal ideation     Subtherapeutic serum dilantin level     Seizure disorder (HCC)     Frequent falls     Vascular dementia without behavioral disturbance     Delirium due to another medical condition     Wheelchair dependent Hypoxia     Encounter for palliative care     Pneumonia     Dehydration with hyponatremia     Anemia of chronic kidney failure

## 2019-06-21 ENCOUNTER — HOSPITAL ENCOUNTER (INPATIENT)
Age: 69
LOS: 13 days | Discharge: HOME HEALTH | DRG: 193 | End: 2019-07-05
Attending: EMERGENCY MEDICINE | Admitting: INTERNAL MEDICINE
Payer: MEDICARE

## 2019-06-21 ENCOUNTER — APPOINTMENT (OUTPATIENT)
Dept: GENERAL RADIOLOGY | Age: 69
DRG: 193 | End: 2019-06-21
Payer: MEDICARE

## 2019-06-21 DIAGNOSIS — J18.9 PNEUMONIA DUE TO ORGANISM: ICD-10-CM

## 2019-06-21 DIAGNOSIS — I48.19 PERSISTENT ATRIAL FIBRILLATION (HCC): Primary | ICD-10-CM

## 2019-06-21 DIAGNOSIS — R77.8 ELEVATED TROPONIN: ICD-10-CM

## 2019-06-21 LAB
-: NORMAL
ABSOLUTE EOS #: 0.1 K/UL (ref 0–0.4)
ABSOLUTE IMMATURE GRANULOCYTE: 0 K/UL (ref 0–0.3)
ABSOLUTE LYMPH #: 0.78 K/UL (ref 1–4.8)
ABSOLUTE MONO #: 0.83 K/UL (ref 0.1–0.8)
ANION GAP SERPL CALCULATED.3IONS-SCNC: 17 MMOL/L (ref 9–17)
BASOPHILS # BLD: 0 % (ref 0–2)
BASOPHILS ABSOLUTE: 0 K/UL (ref 0–0.2)
BNP INTERPRETATION: ABNORMAL
BUN BLDV-MCNC: 20 MG/DL (ref 8–23)
BUN/CREAT BLD: ABNORMAL (ref 9–20)
CALCIUM SERPL-MCNC: 8.9 MG/DL (ref 8.6–10.4)
CHLORIDE BLD-SCNC: 88 MMOL/L (ref 98–107)
CO2: 24 MMOL/L (ref 20–31)
CREAT SERPL-MCNC: 2.54 MG/DL (ref 0.7–1.2)
DIFFERENTIAL TYPE: ABNORMAL
EOSINOPHILS RELATIVE PERCENT: 2 % (ref 1–4)
GFR AFRICAN AMERICAN: 31 ML/MIN
GFR NON-AFRICAN AMERICAN: 25 ML/MIN
GFR SERPL CREATININE-BSD FRML MDRD: ABNORMAL ML/MIN/{1.73_M2}
GFR SERPL CREATININE-BSD FRML MDRD: ABNORMAL ML/MIN/{1.73_M2}
GLUCOSE BLD-MCNC: 166 MG/DL (ref 70–99)
HCT VFR BLD CALC: 44.3 % (ref 40.7–50.3)
HEMOGLOBIN: 13.6 G/DL (ref 13–17)
IMMATURE GRANULOCYTES: 0 %
LYMPHOCYTES # BLD: 15 % (ref 24–44)
MCH RBC QN AUTO: 32.8 PG (ref 25.2–33.5)
MCHC RBC AUTO-ENTMCNC: 30.7 G/DL (ref 28.4–34.8)
MCV RBC AUTO: 106.7 FL (ref 82.6–102.9)
MONOCYTES # BLD: 16 % (ref 1–7)
MORPHOLOGY: ABNORMAL
NRBC AUTOMATED: 0 PER 100 WBC
PDW BLD-RTO: 16.3 % (ref 11.8–14.4)
PLATELET # BLD: 93 K/UL (ref 138–453)
PLATELET ESTIMATE: ABNORMAL
PMV BLD AUTO: 11.9 FL (ref 8.1–13.5)
POTASSIUM SERPL-SCNC: 4.9 MMOL/L (ref 3.7–5.3)
PRO-BNP: ABNORMAL PG/ML
RBC # BLD: 4.15 M/UL (ref 4.21–5.77)
RBC # BLD: ABNORMAL 10*6/UL
REASON FOR REJECTION: NORMAL
SEG NEUTROPHILS: 67 % (ref 36–66)
SEGMENTED NEUTROPHILS ABSOLUTE COUNT: 3.49 K/UL (ref 1.8–7.7)
SODIUM BLD-SCNC: 129 MMOL/L (ref 135–144)
TROPONIN INTERP: ABNORMAL
TROPONIN INTERP: ABNORMAL
TROPONIN T: ABNORMAL NG/ML
TROPONIN T: ABNORMAL NG/ML
TROPONIN, HIGH SENSITIVITY: 243 NG/L (ref 0–22)
TROPONIN, HIGH SENSITIVITY: 258 NG/L (ref 0–22)
WBC # BLD: 5.2 K/UL (ref 3.5–11.3)
WBC # BLD: ABNORMAL 10*3/UL
ZZ NTE CLEAN UP: ORDERED TEST: NORMAL
ZZ NTE WITH NAME CLEAN UP: SPECIMEN SOURCE: NORMAL

## 2019-06-21 PROCEDURE — 93005 ELECTROCARDIOGRAM TRACING: CPT | Performed by: STUDENT IN AN ORGANIZED HEALTH CARE EDUCATION/TRAINING PROGRAM

## 2019-06-21 PROCEDURE — 71045 X-RAY EXAM CHEST 1 VIEW: CPT

## 2019-06-21 PROCEDURE — 2580000003 HC RX 258: Performed by: EMERGENCY MEDICINE

## 2019-06-21 PROCEDURE — 85025 COMPLETE CBC W/AUTO DIFF WBC: CPT

## 2019-06-21 PROCEDURE — 84484 ASSAY OF TROPONIN QUANT: CPT

## 2019-06-21 PROCEDURE — 6360000002 HC RX W HCPCS: Performed by: EMERGENCY MEDICINE

## 2019-06-21 PROCEDURE — 87040 BLOOD CULTURE FOR BACTERIA: CPT

## 2019-06-21 PROCEDURE — 80048 BASIC METABOLIC PNL TOTAL CA: CPT

## 2019-06-21 PROCEDURE — 83880 ASSAY OF NATRIURETIC PEPTIDE: CPT

## 2019-06-21 PROCEDURE — 96365 THER/PROPH/DIAG IV INF INIT: CPT

## 2019-06-21 PROCEDURE — 99285 EMERGENCY DEPT VISIT HI MDM: CPT

## 2019-06-21 RX ORDER — VANCOMYCIN HYDROCHLORIDE 1 G/200ML
15 INJECTION, SOLUTION INTRAVENOUS ONCE
Status: DISCONTINUED | OUTPATIENT
Start: 2019-06-21 | End: 2019-06-24

## 2019-06-21 RX ORDER — SODIUM CHLORIDE, SODIUM LACTATE, POTASSIUM CHLORIDE, AND CALCIUM CHLORIDE .6; .31; .03; .02 G/100ML; G/100ML; G/100ML; G/100ML
500 INJECTION, SOLUTION INTRAVENOUS ONCE
Status: COMPLETED | OUTPATIENT
Start: 2019-06-21 | End: 2019-06-21

## 2019-06-21 RX ADMIN — CEFTRIAXONE SODIUM 2 G: 2 INJECTION, POWDER, FOR SOLUTION INTRAMUSCULAR; INTRAVENOUS at 23:59

## 2019-06-21 RX ADMIN — SODIUM CHLORIDE, POTASSIUM CHLORIDE, SODIUM LACTATE AND CALCIUM CHLORIDE 500 ML: 600; 310; 30; 20 INJECTION, SOLUTION INTRAVENOUS at 21:36

## 2019-06-22 ENCOUNTER — APPOINTMENT (OUTPATIENT)
Dept: CT IMAGING | Age: 69
DRG: 193 | End: 2019-06-22
Payer: MEDICARE

## 2019-06-22 PROBLEM — Z85.528 HISTORY OF RENAL CELL CARCINOMA: Status: ACTIVE | Noted: 2019-06-22

## 2019-06-22 PROBLEM — I48.91 ATRIAL FIBRILLATION (HCC): Status: ACTIVE | Noted: 2019-06-22

## 2019-06-22 PROBLEM — D69.6 THROMBOCYTOPENIA (HCC): Status: ACTIVE | Noted: 2019-06-22

## 2019-06-22 LAB
ABSOLUTE EOS #: 0.07 K/UL (ref 0–0.44)
ABSOLUTE IMMATURE GRANULOCYTE: 0.03 K/UL (ref 0–0.3)
ABSOLUTE LYMPH #: 0.97 K/UL (ref 1.1–3.7)
ABSOLUTE MONO #: 0.72 K/UL (ref 0.1–1.2)
ABSOLUTE RETIC #: 0.12 M/UL (ref 0.03–0.08)
ANION GAP SERPL CALCULATED.3IONS-SCNC: 18 MMOL/L (ref 9–17)
BASOPHILS # BLD: 1 % (ref 0–2)
BASOPHILS ABSOLUTE: 0.03 K/UL (ref 0–0.2)
BUN BLDV-MCNC: 23 MG/DL (ref 8–23)
BUN/CREAT BLD: ABNORMAL (ref 9–20)
CALCIUM SERPL-MCNC: 9.1 MG/DL (ref 8.6–10.4)
CHLORIDE BLD-SCNC: 89 MMOL/L (ref 98–107)
CO2: 22 MMOL/L (ref 20–31)
CREAT SERPL-MCNC: 2.89 MG/DL (ref 0.7–1.2)
DIFFERENTIAL TYPE: ABNORMAL
EKG ATRIAL RATE: 78 BPM
EKG Q-T INTERVAL: 396 MS
EKG QRS DURATION: 88 MS
EKG QTC CALCULATION (BAZETT): 467 MS
EKG R AXIS: -64 DEGREES
EKG T AXIS: 174 DEGREES
EKG VENTRICULAR RATE: 84 BPM
EOSINOPHILS RELATIVE PERCENT: 1 % (ref 1–4)
FOLATE: >20 NG/ML
GFR AFRICAN AMERICAN: 26 ML/MIN
GFR NON-AFRICAN AMERICAN: 22 ML/MIN
GFR SERPL CREATININE-BSD FRML MDRD: ABNORMAL ML/MIN/{1.73_M2}
GFR SERPL CREATININE-BSD FRML MDRD: ABNORMAL ML/MIN/{1.73_M2}
GLUCOSE BLD-MCNC: 122 MG/DL (ref 70–99)
GLUCOSE BLD-MCNC: 83 MG/DL (ref 75–110)
GLUCOSE BLD-MCNC: 85 MG/DL (ref 75–110)
GLUCOSE BLD-MCNC: 97 MG/DL (ref 75–110)
HCT VFR BLD CALC: 39.4 % (ref 40.7–50.3)
HEMOGLOBIN: 12.4 G/DL (ref 13–17)
IMMATURE GRANULOCYTES: 1 %
IMMATURE RETIC FRACT: 27.2 % (ref 2.7–18.3)
KEPPRA: 27 UG/ML
LYMPHOCYTES # BLD: 19 % (ref 24–43)
MCH RBC QN AUTO: 33 PG (ref 25.2–33.5)
MCHC RBC AUTO-ENTMCNC: 31.5 G/DL (ref 28.4–34.8)
MCV RBC AUTO: 104.8 FL (ref 82.6–102.9)
MONOCYTES # BLD: 14 % (ref 3–12)
NRBC AUTOMATED: 0 PER 100 WBC
PARTIAL THROMBOPLASTIN TIME: 27.7 SEC (ref 20.5–30.5)
PARTIAL THROMBOPLASTIN TIME: 49.7 SEC (ref 20.5–30.5)
PDW BLD-RTO: 16.3 % (ref 11.8–14.4)
PLATELET # BLD: 86 K/UL (ref 138–453)
PLATELET ESTIMATE: ABNORMAL
PMV BLD AUTO: 12.5 FL (ref 8.1–13.5)
POTASSIUM SERPL-SCNC: 4.1 MMOL/L (ref 3.7–5.3)
RBC # BLD: 3.76 M/UL (ref 4.21–5.77)
RBC # BLD: ABNORMAL 10*6/UL
RETIC %: 3.3 % (ref 0.5–1.9)
RETIC HEMOGLOBIN: 33.4 PG (ref 28.2–35.7)
SEG NEUTROPHILS: 64 % (ref 36–65)
SEGMENTED NEUTROPHILS ABSOLUTE COUNT: 3.34 K/UL (ref 1.5–8.1)
SODIUM BLD-SCNC: 129 MMOL/L (ref 135–144)
TROPONIN INTERP: ABNORMAL
TROPONIN INTERP: ABNORMAL
TROPONIN T: ABNORMAL NG/ML
TROPONIN T: ABNORMAL NG/ML
TROPONIN, HIGH SENSITIVITY: 287 NG/L (ref 0–22)
TROPONIN, HIGH SENSITIVITY: 291 NG/L (ref 0–22)
TSH SERPL DL<=0.05 MIU/L-ACNC: 2.12 MIU/L (ref 0.3–5)
VITAMIN B-12: 1127 PG/ML (ref 232–1245)
WBC # BLD: 5.2 K/UL (ref 3.5–11.3)
WBC # BLD: ABNORMAL 10*3/UL

## 2019-06-22 PROCEDURE — 99223 1ST HOSP IP/OBS HIGH 75: CPT | Performed by: FAMILY MEDICINE

## 2019-06-22 PROCEDURE — 2700000000 HC OXYGEN THERAPY PER DAY

## 2019-06-22 PROCEDURE — 82607 VITAMIN B-12: CPT

## 2019-06-22 PROCEDURE — 85730 THROMBOPLASTIN TIME PARTIAL: CPT

## 2019-06-22 PROCEDURE — 94640 AIRWAY INHALATION TREATMENT: CPT

## 2019-06-22 PROCEDURE — 93005 ELECTROCARDIOGRAM TRACING: CPT

## 2019-06-22 PROCEDURE — 94762 N-INVAS EAR/PLS OXIMTRY CONT: CPT

## 2019-06-22 PROCEDURE — 80177 DRUG SCRN QUAN LEVETIRACETAM: CPT

## 2019-06-22 PROCEDURE — 2060000000 HC ICU INTERMEDIATE R&B

## 2019-06-22 PROCEDURE — 80048 BASIC METABOLIC PNL TOTAL CA: CPT

## 2019-06-22 PROCEDURE — 2580000003 HC RX 258: Performed by: STUDENT IN AN ORGANIZED HEALTH CARE EDUCATION/TRAINING PROGRAM

## 2019-06-22 PROCEDURE — 87040 BLOOD CULTURE FOR BACTERIA: CPT

## 2019-06-22 PROCEDURE — 84484 ASSAY OF TROPONIN QUANT: CPT

## 2019-06-22 PROCEDURE — 93010 ELECTROCARDIOGRAM REPORT: CPT | Performed by: INTERNAL MEDICINE

## 2019-06-22 PROCEDURE — 6360000002 HC RX W HCPCS: Performed by: STUDENT IN AN ORGANIZED HEALTH CARE EDUCATION/TRAINING PROGRAM

## 2019-06-22 PROCEDURE — 85045 AUTOMATED RETICULOCYTE COUNT: CPT

## 2019-06-22 PROCEDURE — 84443 ASSAY THYROID STIM HORMONE: CPT

## 2019-06-22 PROCEDURE — 85025 COMPLETE CBC W/AUTO DIFF WBC: CPT

## 2019-06-22 PROCEDURE — 36415 COLL VENOUS BLD VENIPUNCTURE: CPT

## 2019-06-22 PROCEDURE — 94664 DEMO&/EVAL PT USE INHALER: CPT

## 2019-06-22 PROCEDURE — 70450 CT HEAD/BRAIN W/O DYE: CPT

## 2019-06-22 PROCEDURE — 6370000000 HC RX 637 (ALT 250 FOR IP): Performed by: STUDENT IN AN ORGANIZED HEALTH CARE EDUCATION/TRAINING PROGRAM

## 2019-06-22 PROCEDURE — 99223 1ST HOSP IP/OBS HIGH 75: CPT | Performed by: INTERNAL MEDICINE

## 2019-06-22 PROCEDURE — 82947 ASSAY GLUCOSE BLOOD QUANT: CPT

## 2019-06-22 PROCEDURE — 86738 MYCOPLASMA ANTIBODY: CPT

## 2019-06-22 PROCEDURE — 82746 ASSAY OF FOLIC ACID SERUM: CPT

## 2019-06-22 PROCEDURE — 93005 ELECTROCARDIOGRAM TRACING: CPT | Performed by: EMERGENCY MEDICINE

## 2019-06-22 RX ORDER — DEXTROSE MONOHYDRATE 50 MG/ML
100 INJECTION, SOLUTION INTRAVENOUS PRN
Status: DISCONTINUED | OUTPATIENT
Start: 2019-06-22 | End: 2019-07-05

## 2019-06-22 RX ORDER — HEPARIN SODIUM 1000 [USP'U]/ML
2000 INJECTION, SOLUTION INTRAVENOUS; SUBCUTANEOUS PRN
Status: DISCONTINUED | OUTPATIENT
Start: 2019-06-22 | End: 2019-06-23

## 2019-06-22 RX ORDER — MAGNESIUM SULFATE 1 G/100ML
1 INJECTION INTRAVENOUS PRN
Status: DISCONTINUED | OUTPATIENT
Start: 2019-06-22 | End: 2019-06-22

## 2019-06-22 RX ORDER — SODIUM CHLORIDE 0.9 % (FLUSH) 0.9 %
10 SYRINGE (ML) INJECTION EVERY 12 HOURS SCHEDULED
Status: DISCONTINUED | OUTPATIENT
Start: 2019-06-22 | End: 2019-07-05

## 2019-06-22 RX ORDER — SIMVASTATIN 10 MG
10 TABLET ORAL NIGHTLY
Status: DISCONTINUED | OUTPATIENT
Start: 2019-06-22 | End: 2019-07-05

## 2019-06-22 RX ORDER — ALBUTEROL SULFATE 2.5 MG/3ML
2.5 SOLUTION RESPIRATORY (INHALATION)
Status: DISCONTINUED | OUTPATIENT
Start: 2019-06-22 | End: 2019-07-05

## 2019-06-22 RX ORDER — HEPARIN SODIUM 10000 [USP'U]/100ML
12 INJECTION, SOLUTION INTRAVENOUS CONTINUOUS
Status: DISCONTINUED | OUTPATIENT
Start: 2019-06-22 | End: 2019-06-23

## 2019-06-22 RX ORDER — SODIUM CHLORIDE 0.9 % (FLUSH) 0.9 %
10 SYRINGE (ML) INJECTION PRN
Status: DISCONTINUED | OUTPATIENT
Start: 2019-06-22 | End: 2019-07-05

## 2019-06-22 RX ORDER — DEXTROSE MONOHYDRATE 25 G/50ML
12.5 INJECTION, SOLUTION INTRAVENOUS PRN
Status: DISCONTINUED | OUTPATIENT
Start: 2019-06-22 | End: 2019-07-05

## 2019-06-22 RX ORDER — NICOTINE POLACRILEX 4 MG
15 LOZENGE BUCCAL PRN
Status: DISCONTINUED | OUTPATIENT
Start: 2019-06-22 | End: 2019-07-05

## 2019-06-22 RX ORDER — HEPARIN SODIUM 1000 [USP'U]/ML
4000 INJECTION, SOLUTION INTRAVENOUS; SUBCUTANEOUS ONCE
Status: COMPLETED | OUTPATIENT
Start: 2019-06-22 | End: 2019-06-22

## 2019-06-22 RX ORDER — SENNA PLUS 8.6 MG/1
1 TABLET ORAL DAILY PRN
Status: DISCONTINUED | OUTPATIENT
Start: 2019-06-22 | End: 2019-07-05

## 2019-06-22 RX ORDER — PANTOPRAZOLE SODIUM 40 MG/1
40 TABLET, DELAYED RELEASE ORAL
Status: DISCONTINUED | OUTPATIENT
Start: 2019-06-22 | End: 2019-07-05

## 2019-06-22 RX ORDER — LEVETIRACETAM 500 MG/1
500 TABLET ORAL 2 TIMES DAILY
Status: DISCONTINUED | OUTPATIENT
Start: 2019-06-22 | End: 2019-06-26

## 2019-06-22 RX ORDER — HEPARIN SODIUM 1000 [USP'U]/ML
4000 INJECTION, SOLUTION INTRAVENOUS; SUBCUTANEOUS PRN
Status: DISCONTINUED | OUTPATIENT
Start: 2019-06-22 | End: 2019-06-23

## 2019-06-22 RX ORDER — ONDANSETRON 2 MG/ML
4 INJECTION INTRAMUSCULAR; INTRAVENOUS EVERY 8 HOURS PRN
Status: DISCONTINUED | OUTPATIENT
Start: 2019-06-22 | End: 2019-06-22

## 2019-06-22 RX ORDER — FOLIC ACID 1 MG/1
1 TABLET ORAL DAILY
Status: DISCONTINUED | OUTPATIENT
Start: 2019-06-22 | End: 2019-07-05

## 2019-06-22 RX ORDER — IPRATROPIUM BROMIDE AND ALBUTEROL SULFATE 2.5; .5 MG/3ML; MG/3ML
3 SOLUTION RESPIRATORY (INHALATION) 3 TIMES DAILY
Status: DISCONTINUED | OUTPATIENT
Start: 2019-06-22 | End: 2019-06-24

## 2019-06-22 RX ORDER — ZIPRASIDONE HYDROCHLORIDE 40 MG/1
40 CAPSULE ORAL 2 TIMES DAILY WITH MEALS
Status: DISCONTINUED | OUTPATIENT
Start: 2019-06-22 | End: 2019-07-05

## 2019-06-22 RX ORDER — ACETAMINOPHEN 500 MG
500 TABLET ORAL EVERY 4 HOURS PRN
Status: DISCONTINUED | OUTPATIENT
Start: 2019-06-22 | End: 2019-07-05

## 2019-06-22 RX ORDER — METOPROLOL SUCCINATE 50 MG/1
50 TABLET, EXTENDED RELEASE ORAL NIGHTLY
Status: CANCELLED | OUTPATIENT
Start: 2019-06-22

## 2019-06-22 RX ORDER — ACETAMINOPHEN 325 MG/1
650 TABLET ORAL EVERY 4 HOURS PRN
Status: DISCONTINUED | OUTPATIENT
Start: 2019-06-22 | End: 2019-06-22

## 2019-06-22 RX ORDER — SODIUM CHLORIDE 9 MG/ML
INJECTION, SOLUTION INTRAVENOUS CONTINUOUS
Status: DISCONTINUED | OUTPATIENT
Start: 2019-06-22 | End: 2019-06-22

## 2019-06-22 RX ORDER — SEVELAMER CARBONATE 800 MG/1
800 TABLET, FILM COATED ORAL
Status: DISCONTINUED | OUTPATIENT
Start: 2019-06-22 | End: 2019-07-05

## 2019-06-22 RX ORDER — ESCITALOPRAM OXALATE 10 MG/1
10 TABLET ORAL DAILY
Status: DISCONTINUED | OUTPATIENT
Start: 2019-06-22 | End: 2019-07-05

## 2019-06-22 RX ORDER — ONDANSETRON 2 MG/ML
4 INJECTION INTRAMUSCULAR; INTRAVENOUS EVERY 6 HOURS PRN
Status: DISCONTINUED | OUTPATIENT
Start: 2019-06-22 | End: 2019-07-05

## 2019-06-22 RX ORDER — 0.9 % SODIUM CHLORIDE 0.9 %
500 INTRAVENOUS SOLUTION INTRAVENOUS ONCE
Status: COMPLETED | OUTPATIENT
Start: 2019-06-22 | End: 2019-06-23

## 2019-06-22 RX ORDER — MIDODRINE HYDROCHLORIDE 5 MG/1
10 TABLET ORAL PRN
Status: DISCONTINUED | OUTPATIENT
Start: 2019-06-22 | End: 2019-06-28

## 2019-06-22 RX ADMIN — ZIPRASIDONE HYDROCHLORIDE 40 MG: 40 CAPSULE ORAL at 11:11

## 2019-06-22 RX ADMIN — AZITHROMYCIN MONOHYDRATE 500 MG: 500 INJECTION, POWDER, LYOPHILIZED, FOR SOLUTION INTRAVENOUS at 08:40

## 2019-06-22 RX ADMIN — HEPARIN SODIUM AND DEXTROSE 12 UNITS/KG/HR: 10000; 5 INJECTION INTRAVENOUS at 11:15

## 2019-06-22 RX ADMIN — PANTOPRAZOLE SODIUM 40 MG: 40 TABLET, DELAYED RELEASE ORAL at 11:11

## 2019-06-22 RX ADMIN — LEVETIRACETAM 500 MG: 500 TABLET, FILM COATED ORAL at 21:08

## 2019-06-22 RX ADMIN — SIMVASTATIN 10 MG: 10 TABLET, FILM COATED ORAL at 21:08

## 2019-06-22 RX ADMIN — IPRATROPIUM BROMIDE AND ALBUTEROL SULFATE 3 ML: .5; 3 SOLUTION RESPIRATORY (INHALATION) at 15:11

## 2019-06-22 RX ADMIN — ESCITALOPRAM OXALATE 10 MG: 10 TABLET ORAL at 11:11

## 2019-06-22 RX ADMIN — ZIPRASIDONE HYDROCHLORIDE 40 MG: 40 CAPSULE ORAL at 17:54

## 2019-06-22 RX ADMIN — FOLIC ACID 1 MG: 1 TABLET ORAL at 11:27

## 2019-06-22 RX ADMIN — SEVELAMER CARBONATE 800 MG: 800 TABLET, FILM COATED ORAL at 17:54

## 2019-06-22 RX ADMIN — MIRTAZAPINE 45 MG: 30 TABLET, FILM COATED ORAL at 21:08

## 2019-06-22 RX ADMIN — HEPARIN SODIUM 4000 UNITS: 1000 INJECTION, SOLUTION INTRAVENOUS; SUBCUTANEOUS at 11:16

## 2019-06-22 RX ADMIN — CEFTRIAXONE SODIUM 1 G: 1 INJECTION, POWDER, FOR SOLUTION INTRAMUSCULAR; INTRAVENOUS at 23:36

## 2019-06-22 RX ADMIN — SODIUM CHLORIDE 500 ML: 9 INJECTION, SOLUTION INTRAVENOUS at 22:40

## 2019-06-22 RX ADMIN — SODIUM CHLORIDE: 9 INJECTION, SOLUTION INTRAVENOUS at 08:34

## 2019-06-22 RX ADMIN — LEVETIRACETAM 500 MG: 500 TABLET, FILM COATED ORAL at 11:11

## 2019-06-22 RX ADMIN — IPRATROPIUM BROMIDE AND ALBUTEROL SULFATE 3 ML: .5; 3 SOLUTION RESPIRATORY (INHALATION) at 08:41

## 2019-06-22 RX ADMIN — IPRATROPIUM BROMIDE AND ALBUTEROL SULFATE 3 ML: .5; 3 SOLUTION RESPIRATORY (INHALATION) at 20:14

## 2019-06-22 NOTE — ED NOTES
Bed: 27  Expected date: 6/21/19  Expected time: 8:21 PM  Means of arrival: [facility]  Comments:  [facility] [address], RN  06/21/19 2025

## 2019-06-22 NOTE — PROGRESS NOTES
Pt admitted to [address]. Pt is alert and oriented x3. Pt denies any chest pain, SOB, or dizziness. His is resting with bed in lowest position, wheels locked, and call light within reach. Will continue to assess and monitor.

## 2019-06-22 NOTE — CONSULTS
Palliative Care Inpatient Consult    NAME:  Holly Busch  MEDICAL RECORD NUMBER:  [id number]  AGE: 77 y.o. GENDER: male  : 1950  TODAY'S DATE:  2019    Reasons for Consultation:    Symptom and/or pain management  Provision of information regarding PC and/or hospice philosophies  Complex, time-intensive communication and interdisciplinary psychosocial support  Clarification of goals of care and/or assistance with difficult decision-making  Guidance in regards to resources and transition(s)    Members of PC team contributing to this consultation are :  Dr. Vani Johnson palliative care attending  History of Present Illness     The patient is a 77 y.o. / male who presents with Shortness of Breath and Atrial Fibrillation (new onset)      Referred to Palliative Care by   [x] Physician   [] Nursing  [] Family Request   [] Other:       He was admitted to the internal medicine service for Atrial fibrillation ([facility] [I48.91]  Atrial fibrillation ([facility] [I48.91]. His hospital course has been associated with Atrial fibrillation ([facility] The patient has a complicated medical history and has been hospitalized since 2019  8:24 PM. The pateint was admitted due c/o some chest discomfort, palpitations, generalized malaise/tiredness. The patient has H/O HTN, HLD, DM2, seizures, h/o RCC s/p embolization, ESRD - had HD yesterday. Chest x- ray showed, multifocal infiltrates suggestive of pneumonia. Palliative care consulted for goals of care, code status and family support.     Active Hospital Problems    Diagnosis Date Noted    Thrombocytopenia ([facility] [D69.6] 2019     Priority: High    Generalized weakness [R53.1] 08/10/2016     Priority: Low     Class: Acute    Atrial fibrillation ([address] [I48.91] 2019    History of renal cell carcinoma [Z85.528] 2019    Pneumonia [J18.9] 05/15/2018    Anemia of chronic kidney failure [N18.9, D63.1] 05/15/2018    Wheelchair dependent [Z99.3]

## 2019-06-22 NOTE — ED TRIAGE NOTES
Pt c/o new onset AFIB and SOB, Arrived via EMS, family stated he was unresponsive but was conscious when EMS arrived and answering question appropriately.  EKG obtained,

## 2019-06-22 NOTE — CONSULTS
BRADLY    ENDOSCOPY, COLON, DIAGNOSTIC      EYE SURGERY      laser    EYE SURGERY       Home Medications:  Prior to Admission medications    Medication Sig Start Date End Date Taking? Authorizing Provider   acetaminophen (SM PAIN RELIEVER) 325 MG tablet Take 2 tablets by mouth every 6 hours as needed for Pain 6/20/19   Bharat Page PA-C   ziprasidone (GEODON) 40 MG capsule Take 1 capsule by mouth 2 times daily (with meals) 6/13/19   Bharat Page PA-C   folic acid (FOLVITE) 1 MG tablet Take 1 tablet by mouth daily 6/5/19   Bharat Page PA-C   levETIRAcetam (KEPPRA) 500 MG tablet Take 1 tablet by mouth 2 times daily 6/3/19   Bharat Page PA-C   Lancets MIS TEST BLOOD SUGAR TWICE DAILY 5/28/19   Bharat Page PA-C   simvastatin (ZOCOR) 10 MG tablet Take 1 tablet by mouth nightly 4/17/19   Bharat Page PA-C   metoprolol succinate (TOPROL XL) 50 MG extended release tablet Take 1 tablet by mouth nightly 4/12/19   Bharat Page PA-C   escitalopram (LEXAPRO) 10 MG tablet Take 1 tablet by mouth daily 3/14/19   Bharat Page PA-C   lisinopril (PRINIVIL;ZESTRIL) 10 MG tablet Take 1 tablet by mouth daily 3/14/19   Bharat Page PA-C   clopidogrel (PLAVIX) 75 MG tablet Take 1 tablet by mouth daily 3/11/19   Bharat Page PA-C   amLODIPine (NORVASC) 5 MG tablet Take 1 tablet by mouth daily 3/10/19   Bharat Page PA-C   omeprazole (PRILOSEC) 20 MG delayed release capsule Take 1 capsule by mouth Daily 3/10/19   Bharat Page PA-C   sevelamer (RENVELA) 800 MG tablet  1/28/19   Historical Provider, MD   mirtazapine (REMERON) 45 MG tablet Take 1 tablet by mouth nightly 2/25/19   Bharat Page PA-C   blood glucose test strips (TRUE METRIX BLOOD GLUCOSE TEST) strip 1 each by In Vitro route 3 times daily As needed.  10/11/18   Bharat Page PA-C   [facility] 4 MG tablet Take 1 tablet by mouth daily as needed for Vomiting 8/28/18   Bharat Page MITZI   albuterol (PROVENTIL) (2.5 MG/3ML) 0.083% nebulizer solution Take 3 mLs by nebulization every 2 hours as needed for Wheezing 5/19/18   Jaimie Baker MD   ipratropium-albuterol (DUONEB) 0.5-2.5 (3) MG/3ML SOLN nebulizer solution Inhale 3 mLs into the lungs three times daily 5/19/18   Jaimie Baker MD   magnesium hydroxide (MILK OF MAGNESIA) 400 MG/5ML suspension Take 30 mLs by mouth daily as needed for Constipation 5/19/18   Jaimie Baker MD   midodrine (PROAMATINE) 10 MG tablet Take 1 tablet by mouth as needed (for systolic blood pressure less than 100) 5/19/18   Jaimie Baker MD   nicotine (NICODERM CQ) 21 MG/24HR Place 1 patch onto the skin daily as needed (if patient smokes/requests nicotine replacent therapy) 5/19/18   Jaimie Baker MD     Current Inpatient Medications:   sodium chloride flush  10 mL Intravenous 2 times per day    escitalopram  10 mg Oral Daily    folic acid  1 mg Oral Daily    ipratropium-albuterol  3 mL Inhalation TID    levETIRAcetam  500 mg Oral BID    mirtazapine  45 mg Oral Nightly    pantoprazole  40 mg Oral QAM AC    sevelamer  800 mg Oral TID WC    simvastatin  10 mg Oral Nightly    ziprasidone  40 mg Oral BID WC    sodium chloride flush  10 mL Intravenous 2 times per day    [START ON 6/23/2019] cefTRIAXone (ROCEPHIN) IV  1 g Intravenous Q24H    azithromycin  500 mg Intravenous Q24H    heparin (porcine)  4,000 Units Intravenous Once    insulin lispro  0-12 Units Subcutaneous TID WC    insulin lispro  0-6 Units Subcutaneous Nightly    vancomycin  15 mg/kg Intravenous Once     Allergies:  Patient has no known allergies.   Social History:    Social History     Socioeconomic History    Marital status: Single     Spouse name: Not on file    Number of children: Not on file    Years of education: Not on file    Highest education level: Not on file   Occupational History    Not on file   Social Needs    Financial resource strain: Not on file   [place]

## 2019-06-22 NOTE — FLOWSHEET NOTE
Assessment: Patient was alone in the room when  visited. Patient was drowsy and seemed to be having a rough time. Patient was a bit passive but receptive to pastoral presence. Intervention:  offered support and prayed with patient. Patient received sacrament of anointing of the sick. Outcome: Patient expressed gratitude for the anointing he received. Follow up visits recommended for more prayers and support.       06/22/19 1105   Encounter Summary   Services provided to: Patient   Support System Family members   Continue Visiting   (06/22/2019)   Complexity of Encounter Moderate   Length of Encounter 15 minutes   Routine   Type Follow up   Spiritual/Restorationism   Type Spiritual support   Assessment Calm; Approachable   Intervention Active listening;Prayer; Anointing   Outcome Expressed gratitude   Sacraments   Sacrament of Sick-Anointing Anointed   Advance Directives (For Healthcare)   Healthcare Directive Yes, patient has an advance directive for healthcare treatment

## 2019-06-22 NOTE — H&P
Slight curvature  LUNGS:  Decreased breath sounds on right, no wheezing or rhonchi, slight dullness to percussion on right side  CARDIOVASCULAR:  normal apical pulses, normal S1 and S2 and systolic murmur  ABDOMEN:  Soft, nontender, nondistended  NEUROLOGIC:  [place], alert, able to move upper extremtities 4/5, unable to move lower extremities due to history of CVA  SKIN:  no bruising or bleeding      DATA:    CXR 6/19/19:  Right-greater-than-left multifocal airspace disease with effusions. Asymmetric edema is favored over pneumonia               IMPRESSION  This is a 77 y.o. male who presented with weakness and found to have atrial fibrillation and pneumonia. Patient requires admit to inpatient status because atrial fibrillation with right lobe pneumonia. ASSESSMENT/PLAN:  1. Atrial fibrillation likely secondary to pneumonia. Follow-up TSH. Start heparin drip if no ischemia seen on CT head. Trend troponin, cardiology was consulted. Patient takes Toprol-XL 50 mg at home, however is on the hypotensive side will monitor. Follow-up echo  2. Right lobe pneumonia and with complication of asthma. Rocephin and azithromycin. Monitor function status, continue aerosols  3. Stage renal disease. Nephrology consulted. Has dialysis Monday Wednesday Friday. Renvela 800 mg 3 times daily  4. GERD. Protonix  5. Schizoaffective disorder. Continue home dose of Lexapro and Geodon  6. seizure disorder. Continue Keppra 500 mg twice daily. Follow-up free and total Keppra levels  7. hypotension. Midodrine 10 mg as needed 3 times daily  8. Microcytic anemia. Follow-up folate and iron levels as well as B12. Peripheral blood smear  9. History of hypertension. Hold lisinopril, Norvasc 5 mg, Toprol-XL 50 mg until blood pressure improves, IV fluids at 50 mL/h  10. Patient appears in overall poor function with limited mobility. Concern for whether patient will be able to tolerate any intervention.  Palliative

## 2019-06-23 LAB
ABSOLUTE EOS #: 0.09 K/UL (ref 0–0.4)
ABSOLUTE IMMATURE GRANULOCYTE: 0 K/UL (ref 0–0.3)
ABSOLUTE LYMPH #: 0.32 K/UL (ref 1–4.8)
ABSOLUTE MONO #: 0.77 K/UL (ref 0.1–0.8)
ANION GAP SERPL CALCULATED.3IONS-SCNC: 14 MMOL/L (ref 9–17)
BASOPHILS # BLD: 0 % (ref 0–2)
BASOPHILS ABSOLUTE: 0 K/UL (ref 0–0.2)
BUN BLDV-MCNC: 27 MG/DL (ref 8–23)
BUN/CREAT BLD: ABNORMAL (ref 9–20)
CALCIUM SERPL-MCNC: 8.9 MG/DL (ref 8.6–10.4)
CHLORIDE BLD-SCNC: 89 MMOL/L (ref 98–107)
CO2: 27 MMOL/L (ref 20–31)
CREAT SERPL-MCNC: 3.2 MG/DL (ref 0.7–1.2)
CULTURE: NORMAL
DIFFERENTIAL TYPE: ABNORMAL
EOSINOPHILS RELATIVE PERCENT: 2 % (ref 1–4)
GFR AFRICAN AMERICAN: 23 ML/MIN
GFR NON-AFRICAN AMERICAN: 19 ML/MIN
GFR SERPL CREATININE-BSD FRML MDRD: ABNORMAL ML/MIN/{1.73_M2}
GFR SERPL CREATININE-BSD FRML MDRD: ABNORMAL ML/MIN/{1.73_M2}
GLUCOSE BLD-MCNC: 101 MG/DL (ref 75–110)
GLUCOSE BLD-MCNC: 104 MG/DL (ref 75–110)
GLUCOSE BLD-MCNC: 106 MG/DL (ref 75–110)
GLUCOSE BLD-MCNC: 143 MG/DL (ref 75–110)
GLUCOSE BLD-MCNC: 150 MG/DL (ref 70–99)
HCT VFR BLD CALC: 37.6 % (ref 40.7–50.3)
HEMOGLOBIN: 11.4 G/DL (ref 13–17)
IMMATURE GRANULOCYTES: 0 %
LYMPHOCYTES # BLD: 7 % (ref 24–44)
Lab: NORMAL
MCH RBC QN AUTO: 32.2 PG (ref 25.2–33.5)
MCHC RBC AUTO-ENTMCNC: 30.3 G/DL (ref 28.4–34.8)
MCV RBC AUTO: 106.2 FL (ref 82.6–102.9)
MONOCYTES # BLD: 17 % (ref 1–7)
MORPHOLOGY: ABNORMAL
MORPHOLOGY: ABNORMAL
NRBC AUTOMATED: 0 PER 100 WBC
NUCLEATED RED BLOOD CELLS: 2 PER 100 WBC
PARTIAL THROMBOPLASTIN TIME: 46.4 SEC (ref 20.5–30.5)
PDW BLD-RTO: 16.5 % (ref 11.8–14.4)
PLATELET # BLD: 81 K/UL (ref 138–453)
PLATELET ESTIMATE: ABNORMAL
PMV BLD AUTO: 12.1 FL (ref 8.1–13.5)
POTASSIUM SERPL-SCNC: 4.1 MMOL/L (ref 3.7–5.3)
RBC # BLD: 3.54 M/UL (ref 4.21–5.77)
RBC # BLD: ABNORMAL 10*6/UL
SEG NEUTROPHILS: 74 % (ref 36–66)
SEGMENTED NEUTROPHILS ABSOLUTE COUNT: 3.32 K/UL (ref 1.8–7.7)
SODIUM BLD-SCNC: 130 MMOL/L (ref 135–144)
SPECIMEN DESCRIPTION: NORMAL
WBC # BLD: 4.5 K/UL (ref 3.5–11.3)
WBC # BLD: ABNORMAL 10*3/UL

## 2019-06-23 PROCEDURE — 85025 COMPLETE CBC W/AUTO DIFF WBC: CPT

## 2019-06-23 PROCEDURE — 36415 COLL VENOUS BLD VENIPUNCTURE: CPT

## 2019-06-23 PROCEDURE — 94762 N-INVAS EAR/PLS OXIMTRY CONT: CPT

## 2019-06-23 PROCEDURE — 97530 THERAPEUTIC ACTIVITIES: CPT

## 2019-06-23 PROCEDURE — 94640 AIRWAY INHALATION TREATMENT: CPT

## 2019-06-23 PROCEDURE — 80048 BASIC METABOLIC PNL TOTAL CA: CPT

## 2019-06-23 PROCEDURE — 6370000000 HC RX 637 (ALT 250 FOR IP): Performed by: STUDENT IN AN ORGANIZED HEALTH CARE EDUCATION/TRAINING PROGRAM

## 2019-06-23 PROCEDURE — 2060000000 HC ICU INTERMEDIATE R&B

## 2019-06-23 PROCEDURE — 85730 THROMBOPLASTIN TIME PARTIAL: CPT

## 2019-06-23 PROCEDURE — 97166 OT EVAL MOD COMPLEX 45 MIN: CPT

## 2019-06-23 PROCEDURE — 36600 WITHDRAWAL OF ARTERIAL BLOOD: CPT

## 2019-06-23 PROCEDURE — 6360000002 HC RX W HCPCS: Performed by: STUDENT IN AN ORGANIZED HEALTH CARE EDUCATION/TRAINING PROGRAM

## 2019-06-23 PROCEDURE — 2700000000 HC OXYGEN THERAPY PER DAY

## 2019-06-23 PROCEDURE — 93005 ELECTROCARDIOGRAM TRACING: CPT | Performed by: STUDENT IN AN ORGANIZED HEALTH CARE EDUCATION/TRAINING PROGRAM

## 2019-06-23 PROCEDURE — 97162 PT EVAL MOD COMPLEX 30 MIN: CPT

## 2019-06-23 PROCEDURE — 2580000003 HC RX 258: Performed by: STUDENT IN AN ORGANIZED HEALTH CARE EDUCATION/TRAINING PROGRAM

## 2019-06-23 PROCEDURE — 82947 ASSAY GLUCOSE BLOOD QUANT: CPT

## 2019-06-23 PROCEDURE — 99232 SBSQ HOSP IP/OBS MODERATE 35: CPT | Performed by: INTERNAL MEDICINE

## 2019-06-23 RX ORDER — AZITHROMYCIN 250 MG/1
500 TABLET, FILM COATED ORAL DAILY
Status: DISCONTINUED | OUTPATIENT
Start: 2019-06-24 | End: 2019-06-24

## 2019-06-23 RX ADMIN — APIXABAN 2.5 MG: 2.5 TABLET, FILM COATED ORAL at 20:55

## 2019-06-23 RX ADMIN — ZIPRASIDONE HYDROCHLORIDE 40 MG: 40 CAPSULE ORAL at 18:08

## 2019-06-23 RX ADMIN — PANTOPRAZOLE SODIUM 40 MG: 40 TABLET, DELAYED RELEASE ORAL at 10:18

## 2019-06-23 RX ADMIN — SIMVASTATIN 10 MG: 10 TABLET, FILM COATED ORAL at 20:55

## 2019-06-23 RX ADMIN — IPRATROPIUM BROMIDE AND ALBUTEROL SULFATE 3 ML: .5; 3 SOLUTION RESPIRATORY (INHALATION) at 09:06

## 2019-06-23 RX ADMIN — ESCITALOPRAM OXALATE 10 MG: 10 TABLET ORAL at 10:19

## 2019-06-23 RX ADMIN — APIXABAN 2.5 MG: 2.5 TABLET, FILM COATED ORAL at 10:24

## 2019-06-23 RX ADMIN — LEVETIRACETAM 500 MG: 500 TABLET, FILM COATED ORAL at 10:18

## 2019-06-23 RX ADMIN — INSULIN LISPRO 2 UNITS: 100 INJECTION, SOLUTION INTRAVENOUS; SUBCUTANEOUS at 08:13

## 2019-06-23 RX ADMIN — LEVETIRACETAM 500 MG: 500 TABLET, FILM COATED ORAL at 20:55

## 2019-06-23 RX ADMIN — HEPARIN SODIUM 2000 UNITS: 1000 INJECTION, SOLUTION INTRAVENOUS; SUBCUTANEOUS at 03:09

## 2019-06-23 RX ADMIN — FOLIC ACID 1 MG: 1 TABLET ORAL at 10:19

## 2019-06-23 RX ADMIN — MIRTAZAPINE 45 MG: 30 TABLET, FILM COATED ORAL at 20:55

## 2019-06-23 RX ADMIN — SEVELAMER CARBONATE 800 MG: 800 TABLET, FILM COATED ORAL at 10:18

## 2019-06-23 RX ADMIN — IPRATROPIUM BROMIDE AND ALBUTEROL SULFATE 3 ML: .5; 3 SOLUTION RESPIRATORY (INHALATION) at 20:25

## 2019-06-23 RX ADMIN — IPRATROPIUM BROMIDE AND ALBUTEROL SULFATE 3 ML: .5; 3 SOLUTION RESPIRATORY (INHALATION) at 13:21

## 2019-06-23 RX ADMIN — AZITHROMYCIN MONOHYDRATE 500 MG: 500 INJECTION, POWDER, LYOPHILIZED, FOR SOLUTION INTRAVENOUS at 03:09

## 2019-06-23 RX ADMIN — ZIPRASIDONE HYDROCHLORIDE 40 MG: 40 CAPSULE ORAL at 10:18

## 2019-06-23 RX ADMIN — SEVELAMER CARBONATE 800 MG: 800 TABLET, FILM COATED ORAL at 18:08

## 2019-06-23 ASSESSMENT — PAIN SCALES - GENERAL: PAINLEVEL_OUTOF10: 0

## 2019-06-23 NOTE — PROGRESS NOTES
Pt is anxious, restless, and continuously calling out. Primary notified. No new orders received. Will continue to monitor.

## 2019-06-23 NOTE — PROGRESS NOTES
33.0 32.2   MCHC 30.7 31.5 30.3   RDW 16.3* 16.3* 16.5*   PLT 93* 86* 81*   MPV 11.9 12.5 12.1      BMP:   Recent Labs     06/21/19  2126 06/22/19  0933 06/23/19  0200   * 129* 130*   K 4.9 4.1 4.1   CL 88* 89* 89*   CO2 24 22 27   BUN 20 23 27*   CREATININE 2.54* 2.89* 3.20*   GLUCOSE 166* 122* 150*   CALCIUM 8.9 9.1 8.9      BNP:      Lab Results   Component Value Date    BNP 2,665 11/12/2013     PTH:     Lab Results   Component Value Date    IPTH 896 03/27/2013       Radiology:     Reviewed as available     Assessment:     1. ESRD on Hemodialysis. His regular HD days are MWF at [facility] facility using LUE AVF under Dr. Joel Young. His dry weight is currently not available. Admitted with 72.6 kgs  2. Generalized malaise/tiredness and poor p.o. Intake  3. Chest discomfort and palpitations  4. New-onset A. Fib, off heparin gtt as does not want new Mid-line access, now on Eliquis BID, cardiology following ECHO. 5. Anemia of chronic disease/microcytic  6. Secondary hyperparathyroidism  7. Hypertension  8. History of renal cell carcinoma status post embolization   9. DM2  10. H/o Seizures  11. Right-sided pneumonia. 12. Code status changed to DRN CCA and palliative care was consulted, as there was concern as to his tolerance of any intervention. Plan:   1. Will get regular dialysis on Monday as per [facility] schedule. K 4.1  2. Strict Input and Output, Daily weigh and document in the chart. 3. Low Potassium, Low phosphorus and low salt diet. Fluids to be restricted to 1500ml/day. 4. IV Aranesp/Epogen for anemia of chronic disease with HD weekly. 5. IV Zemplar per protocol for secondary hyperparathyroidism with HD thrice a week. 6.  Cardiology to manage A. Fib, notice code status change and palliative care consult pending. 7.  Will follow     Nutrition   Please ensure that patient is on a renal diet/TF. Thank you for the consultation.  Please do not hesitate to contact us for

## 2019-06-23 NOTE — PROGRESS NOTES
Jaimie Baker MD   magnesium hydroxide (MILK OF MAGNESIA) 400 MG/5ML suspension Take 30 mLs by mouth daily as needed for Constipation 5/19/18   Jaimie Baker MD   midodrine (PROAMATINE) 10 MG tablet Take 1 tablet by mouth as needed (for systolic blood pressure less than 100) 5/19/18   Jaimie Baker MD   nicotine (NICODERM CQ) 21 MG/24HR Place 1 patch onto the skin daily as needed (if patient smokes/requests nicotine replacent therapy) 5/19/18   Jaimie Baker MD     Scheduled Meds:   apixaban  2.5 mg Oral BID    sodium chloride flush  10 mL Intravenous 2 times per day    escitalopram  10 mg Oral Daily    folic acid  1 mg Oral Daily    ipratropium-albuterol  3 mL Inhalation TID    levETIRAcetam  500 mg Oral BID    mirtazapine  45 mg Oral Nightly    pantoprazole  40 mg Oral QAM AC    sevelamer  800 mg Oral TID WC    simvastatin  10 mg Oral Nightly    ziprasidone  40 mg Oral BID WC    sodium chloride flush  10 mL Intravenous 2 times per day    cefTRIAXone (ROCEPHIN) IV  1 g Intravenous Q24H    azithromycin  500 mg Intravenous Q24H    insulin lispro  0-12 Units Subcutaneous TID WC    insulin lispro  0-6 Units Subcutaneous Nightly    vancomycin  15 mg/kg Intravenous Once     Continuous Infusions:   dextrose       PRN Meds:     sodium chloride flush 10 mL PRN   albuterol 2.5 mg Q2H PRN   midodrine 10 mg PRN   sodium chloride flush 10 mL PRN   magnesium hydroxide 30 mL Daily PRN   ondansetron 4 mg Q6H PRN   senna 1 tablet Daily PRN   acetaminophen 500 mg Q4H PRN   albuterol 2.5 mg As Directed RT PRN   glucose 15 g PRN   dextrose 12.5 g PRN   glucagon (rDNA) 1 mg PRN   dextrose 100 mL/hr PRN             LABS:  CBC: Recent Labs     06/21/19  2306 06/22/19  0933 06/23/19  0200   WBC 5.2 5.2 4.5   RBC 4.15* 3.76* 3.54*   HGB 13.6 12.4* 11.4*   HCT 44.3 39.4* 37.6*   .7* 104.8* 106.2*   RDW 16.3* 16.3* 16.5*   PLT 93* 86* 81*     BMP: Recent Labs     06/21/19  2126 06/22/19  0933 06/23/19  0200 hypertension. Hold lisinopril, Norvasc 5 mg, Toprol-XL 50 mg until blood pressure improves, IV fluids at 50 mL/h  9. Patient appears in overall poor function with limited mobility. Concern for whether patient will be able to tolerate any intervention. Palliative consulted.          Helder Keys MD  Internal Medicine [address], [facility]  PGY-2      IM Attending    Pt seen and examined today by me today   I have discussed the care of pt , including pertinent history and exam findings,  with the resident. I have reviewed the key elements of all parts of the encounter with the resident. I agree with the assessment, plan and orders as documented by the resident.     Electronically signed by Marisabel Rosario MD on 6/23/2019 at 5:27 PM

## 2019-06-24 ENCOUNTER — APPOINTMENT (OUTPATIENT)
Dept: GENERAL RADIOLOGY | Age: 69
DRG: 193 | End: 2019-06-24
Payer: MEDICARE

## 2019-06-24 ENCOUNTER — APPOINTMENT (OUTPATIENT)
Dept: CT IMAGING | Age: 69
DRG: 193 | End: 2019-06-24
Payer: MEDICARE

## 2019-06-24 LAB
ABSOLUTE EOS #: 0.07 K/UL (ref 0–0.4)
ABSOLUTE IMMATURE GRANULOCYTE: 0 K/UL (ref 0–0.3)
ABSOLUTE LYMPH #: 0.87 K/UL (ref 1–4.8)
ABSOLUTE MONO #: 0.74 K/UL (ref 0.1–0.8)
ACTION: NORMAL
ALLEN TEST: ABNORMAL
ANION GAP SERPL CALCULATED.3IONS-SCNC: 17 MMOL/L (ref 9–17)
ANION GAP: 4 MMOL/L (ref 7–16)
BASOPHILS # BLD: 0 % (ref 0–2)
BASOPHILS ABSOLUTE: 0 K/UL (ref 0–0.2)
BUN BLDV-MCNC: 33 MG/DL (ref 8–23)
BUN/CREAT BLD: ABNORMAL (ref 9–20)
CALCIUM IONIZED: 1.16 MMOL/L (ref 1.13–1.33)
CALCIUM SERPL-MCNC: 9.2 MG/DL (ref 8.6–10.4)
CHLORIDE BLD-SCNC: 91 MMOL/L (ref 98–107)
CO2: 21 MMOL/L (ref 20–31)
CREAT SERPL-MCNC: 3.85 MG/DL (ref 0.7–1.2)
DATE AND TIME: NORMAL
DIFFERENTIAL TYPE: ABNORMAL
EKG ATRIAL RATE: 202 BPM
EKG ATRIAL RATE: 214 BPM
EKG ATRIAL RATE: 220 BPM
EKG Q-T INTERVAL: 276 MS
EKG Q-T INTERVAL: 322 MS
EKG Q-T INTERVAL: 380 MS
EKG QRS DURATION: 84 MS
EKG QRS DURATION: 88 MS
EKG QRS DURATION: 88 MS
EKG QTC CALCULATION (BAZETT): 419 MS
EKG QTC CALCULATION (BAZETT): 441 MS
EKG QTC CALCULATION (BAZETT): 447 MS
EKG R AXIS: -123 DEGREES
EKG R AXIS: -56 DEGREES
EKG R AXIS: -97 DEGREES
EKG T AXIS: -146 DEGREES
EKG T AXIS: 168 DEGREES
EKG T AXIS: 171 DEGREES
EKG VENTRICULAR RATE: 116 BPM
EKG VENTRICULAR RATE: 139 BPM
EKG VENTRICULAR RATE: 81 BPM
EOSINOPHILS RELATIVE PERCENT: 1 % (ref 1–4)
FIO2: 35
FIO2: ABNORMAL
FIO2: ABNORMAL
GFR AFRICAN AMERICAN: 19 ML/MIN
GFR NON-AFRICAN AMERICAN: 14 ML/MIN
GFR NON-AFRICAN AMERICAN: 16 ML/MIN
GFR SERPL CREATININE-BSD FRML MDRD: 17 ML/MIN
GFR SERPL CREATININE-BSD FRML MDRD: ABNORMAL ML/MIN/{1.73_M2}
GLUCOSE BLD-MCNC: 107 MG/DL (ref 75–110)
GLUCOSE BLD-MCNC: 113 MG/DL (ref 75–110)
GLUCOSE BLD-MCNC: 117 MG/DL (ref 75–110)
GLUCOSE BLD-MCNC: 127 MG/DL (ref 75–110)
GLUCOSE BLD-MCNC: 143 MG/DL (ref 70–99)
GLUCOSE BLD-MCNC: 163 MG/DL (ref 75–110)
GLUCOSE BLD-MCNC: 166 MG/DL (ref 74–100)
GLUCOSE BLD-MCNC: 74 MG/DL (ref 75–110)
HCT VFR BLD CALC: 41.1 % (ref 40.7–50.3)
HCT VFR BLD CALC: 42.3 % (ref 40.7–50.3)
HEMOGLOBIN: 12.3 G/DL (ref 13–17)
HEMOGLOBIN: 12.7 G/DL (ref 13–17)
IMMATURE GRANULOCYTES: 0 %
LYMPHOCYTES # BLD: 13 % (ref 24–44)
MAGNESIUM: 2.3 MG/DL (ref 1.6–2.6)
MCH RBC QN AUTO: 31.9 PG (ref 25.2–33.5)
MCH RBC QN AUTO: 32.4 PG (ref 25.2–33.5)
MCHC RBC AUTO-ENTMCNC: 29.1 G/DL (ref 28.4–34.8)
MCHC RBC AUTO-ENTMCNC: 30.9 G/DL (ref 28.4–34.8)
MCV RBC AUTO: 104.8 FL (ref 82.6–102.9)
MCV RBC AUTO: 109.9 FL (ref 82.6–102.9)
MODE: ABNORMAL
MONOCYTES # BLD: 11 % (ref 1–7)
MORPHOLOGY: ABNORMAL
MORPHOLOGY: ABNORMAL
MRSA, DNA, NASAL: NORMAL
MYCOPLASMA PNEUMONIAE IGM: 0.23
NEGATIVE BASE EXCESS, ART: 1 (ref 0–2)
NEGATIVE BASE EXCESS, ART: ABNORMAL (ref 0–2)
NEGATIVE BASE EXCESS, ART: ABNORMAL (ref 0–2)
NOTIFY: NORMAL
NRBC AUTOMATED: 0.2 PER 100 WBC
NRBC AUTOMATED: 0.3 PER 100 WBC
O2 DEVICE/FLOW/%: ABNORMAL
PARTIAL THROMBOPLASTIN TIME: 30.6 SEC (ref 20.5–30.5)
PARTIAL THROMBOPLASTIN TIME: 49.6 SEC (ref 20.5–30.5)
PATIENT TEMP: ABNORMAL
PDW BLD-RTO: 16.1 % (ref 11.8–14.4)
PDW BLD-RTO: 16.3 % (ref 11.8–14.4)
PLATELET # BLD: 95 K/UL (ref 138–453)
PLATELET # BLD: ABNORMAL K/UL (ref 138–453)
PLATELET ESTIMATE: ABNORMAL
PLATELET, FLUORESCENCE: 97 K/UL (ref 138–453)
PLATELET, IMMATURE FRACTION: 8.8 % (ref 1.1–10.3)
PMV BLD AUTO: 12.3 FL (ref 8.1–13.5)
PMV BLD AUTO: ABNORMAL FL (ref 8.1–13.5)
POC CHLORIDE: 91 MMOL/L (ref 98–107)
POC CREATININE: 4.14 MG/DL (ref 0.51–1.19)
POC HCO3: 32 MMOL/L (ref 21–28)
POC HCO3: 33.4 MMOL/L (ref 21–28)
POC HCO3: 34.5 MMOL/L (ref 21–28)
POC HEMATOCRIT: 41 % (ref 41–53)
POC HEMOGLOBIN: 13.8 G/DL (ref 13.5–17.5)
POC IONIZED CALCIUM: 1.27 MMOL/L (ref 1.15–1.33)
POC LACTIC ACID: 1.24 MMOL/L (ref 0.56–1.39)
POC O2 SATURATION: 43 % (ref 94–98)
POC O2 SATURATION: 88 % (ref 94–98)
POC O2 SATURATION: 94 % (ref 94–98)
POC PCO2 TEMP: ABNORMAL MM HG
POC PCO2: 101.2 MM HG (ref 35–48)
POC PCO2: 107.8 MM HG (ref 35–48)
POC PCO2: 107.8 MM HG (ref 35–48)
POC PH TEMP: ABNORMAL
POC PH: 7.1 (ref 7.35–7.45)
POC PH: 7.11 (ref 7.35–7.45)
POC PH: 7.11 (ref 7.35–7.45)
POC PO2 TEMP: ABNORMAL MM HG
POC PO2: 102.6 MM HG (ref 83–108)
POC PO2: 34 MM HG (ref 83–108)
POC PO2: 76.9 MM HG (ref 83–108)
POC POTASSIUM: 4.4 MMOL/L (ref 3.5–4.5)
POC SODIUM: 127 MMOL/L (ref 138–146)
POSITIVE BASE EXCESS, ART: 0 (ref 0–3)
POSITIVE BASE EXCESS, ART: 1 (ref 0–3)
POSITIVE BASE EXCESS, ART: ABNORMAL (ref 0–3)
POTASSIUM SERPL-SCNC: 4.7 MMOL/L (ref 3.7–5.3)
RBC # BLD: 3.85 M/UL (ref 4.21–5.77)
RBC # BLD: 3.92 M/UL (ref 4.21–5.77)
RBC # BLD: ABNORMAL 10*6/UL
READ BACK: YES
SAMPLE SITE: ABNORMAL
SEG NEUTROPHILS: 75 % (ref 36–66)
SEGMENTED NEUTROPHILS ABSOLUTE COUNT: 5.02 K/UL (ref 1.8–7.7)
SODIUM BLD-SCNC: 129 MMOL/L (ref 135–144)
SPECIMEN DESCRIPTION: NORMAL
SURGICAL PATHOLOGY REPORT: NORMAL
TCO2 (CALC), ART: 35 MMOL/L (ref 22–29)
TCO2 (CALC), ART: 37 MMOL/L (ref 22–29)
TCO2 (CALC), ART: 38 MMOL/L (ref 22–29)
TROPONIN INTERP: ABNORMAL
TROPONIN T: ABNORMAL NG/ML
TROPONIN, HIGH SENSITIVITY: 224 NG/L (ref 0–22)
WBC # BLD: 6.7 K/UL (ref 3.5–11.3)
WBC # BLD: 9.4 K/UL (ref 3.5–11.3)
WBC # BLD: ABNORMAL 10*3/UL

## 2019-06-24 PROCEDURE — 2000000000 HC ICU R&B

## 2019-06-24 PROCEDURE — 84484 ASSAY OF TROPONIN QUANT: CPT

## 2019-06-24 PROCEDURE — 6360000002 HC RX W HCPCS: Performed by: STUDENT IN AN ORGANIZED HEALTH CARE EDUCATION/TRAINING PROGRAM

## 2019-06-24 PROCEDURE — 93010 ELECTROCARDIOGRAM REPORT: CPT | Performed by: INTERNAL MEDICINE

## 2019-06-24 PROCEDURE — 6370000000 HC RX 637 (ALT 250 FOR IP): Performed by: STUDENT IN AN ORGANIZED HEALTH CARE EDUCATION/TRAINING PROGRAM

## 2019-06-24 PROCEDURE — 84295 ASSAY OF SERUM SODIUM: CPT

## 2019-06-24 PROCEDURE — 85730 THROMBOPLASTIN TIME PARTIAL: CPT

## 2019-06-24 PROCEDURE — 85027 COMPLETE CBC AUTOMATED: CPT

## 2019-06-24 PROCEDURE — 70450 CT HEAD/BRAIN W/O DYE: CPT

## 2019-06-24 PROCEDURE — 2580000003 HC RX 258: Performed by: STUDENT IN AN ORGANIZED HEALTH CARE EDUCATION/TRAINING PROGRAM

## 2019-06-24 PROCEDURE — 82947 ASSAY GLUCOSE BLOOD QUANT: CPT

## 2019-06-24 PROCEDURE — 94660 CPAP INITIATION&MGMT: CPT

## 2019-06-24 PROCEDURE — 80048 BASIC METABOLIC PNL TOTAL CA: CPT

## 2019-06-24 PROCEDURE — 85014 HEMATOCRIT: CPT

## 2019-06-24 PROCEDURE — 2580000003 HC RX 258: Performed by: INTERNAL MEDICINE

## 2019-06-24 PROCEDURE — 85025 COMPLETE CBC W/AUTO DIFF WBC: CPT

## 2019-06-24 PROCEDURE — P9047 ALBUMIN (HUMAN), 25%, 50ML: HCPCS | Performed by: INTERNAL MEDICINE

## 2019-06-24 PROCEDURE — 06HY33Z INSERTION OF INFUSION DEVICE INTO LOWER VEIN, PERCUTANEOUS APPROACH: ICD-10-PCS | Performed by: INTERNAL MEDICINE

## 2019-06-24 PROCEDURE — 99222 1ST HOSP IP/OBS MODERATE 55: CPT | Performed by: INTERNAL MEDICINE

## 2019-06-24 PROCEDURE — 94640 AIRWAY INHALATION TREATMENT: CPT

## 2019-06-24 PROCEDURE — 93005 ELECTROCARDIOGRAM TRACING: CPT | Performed by: INTERNAL MEDICINE

## 2019-06-24 PROCEDURE — 2500000003 HC RX 250 WO HCPCS: Performed by: STUDENT IN AN ORGANIZED HEALTH CARE EDUCATION/TRAINING PROGRAM

## 2019-06-24 PROCEDURE — 36415 COLL VENOUS BLD VENIPUNCTURE: CPT

## 2019-06-24 PROCEDURE — 85055 RETICULATED PLATELET ASSAY: CPT

## 2019-06-24 PROCEDURE — 83605 ASSAY OF LACTIC ACID: CPT

## 2019-06-24 PROCEDURE — 36600 WITHDRAWAL OF ARTERIAL BLOOD: CPT

## 2019-06-24 PROCEDURE — 71045 X-RAY EXAM CHEST 1 VIEW: CPT

## 2019-06-24 PROCEDURE — 6360000002 HC RX W HCPCS: Performed by: INTERNAL MEDICINE

## 2019-06-24 PROCEDURE — 99291 CRITICAL CARE FIRST HOUR: CPT | Performed by: INTERNAL MEDICINE

## 2019-06-24 PROCEDURE — 82803 BLOOD GASES ANY COMBINATION: CPT

## 2019-06-24 PROCEDURE — 82565 ASSAY OF CREATININE: CPT

## 2019-06-24 PROCEDURE — 36680 INSERT NEEDLE BONE CAVITY: CPT

## 2019-06-24 PROCEDURE — 87641 MR-STAPH DNA AMP PROBE: CPT

## 2019-06-24 PROCEDURE — 84132 ASSAY OF SERUM POTASSIUM: CPT

## 2019-06-24 PROCEDURE — 82330 ASSAY OF CALCIUM: CPT

## 2019-06-24 PROCEDURE — 99232 SBSQ HOSP IP/OBS MODERATE 35: CPT | Performed by: FAMILY MEDICINE

## 2019-06-24 PROCEDURE — 82435 ASSAY OF BLOOD CHLORIDE: CPT

## 2019-06-24 PROCEDURE — 83735 ASSAY OF MAGNESIUM: CPT

## 2019-06-24 RX ORDER — LEVOFLOXACIN 5 MG/ML
500 INJECTION, SOLUTION INTRAVENOUS
Status: DISCONTINUED | OUTPATIENT
Start: 2019-06-26 | End: 2019-06-24

## 2019-06-24 RX ORDER — LEVOFLOXACIN 5 MG/ML
750 INJECTION, SOLUTION INTRAVENOUS ONCE
Status: COMPLETED | OUTPATIENT
Start: 2019-06-24 | End: 2019-06-24

## 2019-06-24 RX ORDER — DOPAMINE HYDROCHLORIDE 160 MG/100ML
2.5 INJECTION, SOLUTION INTRAVENOUS CONTINUOUS
Status: DISCONTINUED | OUTPATIENT
Start: 2019-06-24 | End: 2019-06-24

## 2019-06-24 RX ORDER — LEVOFLOXACIN 5 MG/ML
500 INJECTION, SOLUTION INTRAVENOUS EVERY 24 HOURS
Status: DISCONTINUED | OUTPATIENT
Start: 2019-06-24 | End: 2019-06-24

## 2019-06-24 RX ORDER — HEPARIN SODIUM 10000 [USP'U]/100ML
12 INJECTION, SOLUTION INTRAVENOUS CONTINUOUS
Status: DISCONTINUED | OUTPATIENT
Start: 2019-06-24 | End: 2019-06-27

## 2019-06-24 RX ORDER — SODIUM CHLORIDE 9 MG/ML
INJECTION, SOLUTION INTRAVENOUS CONTINUOUS PRN
Status: COMPLETED | OUTPATIENT
Start: 2019-06-24 | End: 2019-06-24

## 2019-06-24 RX ORDER — HEPARIN SODIUM 1000 [USP'U]/ML
4000 INJECTION, SOLUTION INTRAVENOUS; SUBCUTANEOUS ONCE
Status: COMPLETED | OUTPATIENT
Start: 2019-06-24 | End: 2019-06-24

## 2019-06-24 RX ORDER — HEPARIN SODIUM 1000 [USP'U]/ML
4000 INJECTION, SOLUTION INTRAVENOUS; SUBCUTANEOUS PRN
Status: DISCONTINUED | OUTPATIENT
Start: 2019-06-24 | End: 2019-06-27

## 2019-06-24 RX ORDER — IPRATROPIUM BROMIDE AND ALBUTEROL SULFATE 2.5; .5 MG/3ML; MG/3ML
1 SOLUTION RESPIRATORY (INHALATION) 4 TIMES DAILY
Status: DISCONTINUED | OUTPATIENT
Start: 2019-06-24 | End: 2019-06-30

## 2019-06-24 RX ORDER — ALBUMIN (HUMAN) 12.5 G/50ML
25 SOLUTION INTRAVENOUS ONCE
Status: COMPLETED | OUTPATIENT
Start: 2019-06-24 | End: 2019-06-24

## 2019-06-24 RX ORDER — HEPARIN SODIUM 1000 [USP'U]/ML
2000 INJECTION, SOLUTION INTRAVENOUS; SUBCUTANEOUS PRN
Status: DISCONTINUED | OUTPATIENT
Start: 2019-06-24 | End: 2019-06-27

## 2019-06-24 RX ADMIN — Medication 10 ML: at 21:20

## 2019-06-24 RX ADMIN — SODIUM CHLORIDE 250 ML/HR: 9 INJECTION, SOLUTION INTRAVENOUS at 05:06

## 2019-06-24 RX ADMIN — ALBUMIN (HUMAN) 25 G: 0.25 INJECTION, SOLUTION INTRAVENOUS at 15:29

## 2019-06-24 RX ADMIN — LEVETIRACETAM 500 MG: 500 TABLET, FILM COATED ORAL at 21:20

## 2019-06-24 RX ADMIN — LEVOFLOXACIN 750 MG: 5 INJECTION, SOLUTION INTRAVENOUS at 13:43

## 2019-06-24 RX ADMIN — Medication 10 ML: at 08:30

## 2019-06-24 RX ADMIN — SIMVASTATIN 10 MG: 10 TABLET, FILM COATED ORAL at 21:20

## 2019-06-24 RX ADMIN — INSULIN LISPRO 2 UNITS: 100 INJECTION, SOLUTION INTRAVENOUS; SUBCUTANEOUS at 17:36

## 2019-06-24 RX ADMIN — AMIODARONE HYDROCHLORIDE 0.5 MG/MIN: 50 INJECTION, SOLUTION INTRAVENOUS at 19:32

## 2019-06-24 RX ADMIN — IPRATROPIUM BROMIDE AND ALBUTEROL SULFATE 1 AMPULE: .5; 3 SOLUTION RESPIRATORY (INHALATION) at 12:01

## 2019-06-24 RX ADMIN — HEPARIN SODIUM 4000 UNITS: 1000 INJECTION, SOLUTION INTRAVENOUS; SUBCUTANEOUS at 16:16

## 2019-06-24 RX ADMIN — Medication 10 MCG/MIN: at 06:27

## 2019-06-24 RX ADMIN — IPRATROPIUM BROMIDE AND ALBUTEROL SULFATE 1 AMPULE: .5; 3 SOLUTION RESPIRATORY (INHALATION) at 16:01

## 2019-06-24 RX ADMIN — CEFEPIME HYDROCHLORIDE 1 G: 1 INJECTION, POWDER, FOR SOLUTION INTRAMUSCULAR; INTRAVENOUS at 12:46

## 2019-06-24 RX ADMIN — HEPARIN SODIUM AND DEXTROSE 12 UNITS/KG/HR: 10000; 5 INJECTION INTRAVENOUS at 16:16

## 2019-06-24 RX ADMIN — Medication 10 ML: at 08:31

## 2019-06-24 RX ADMIN — MIRTAZAPINE 45 MG: 30 TABLET, FILM COATED ORAL at 21:21

## 2019-06-24 RX ADMIN — DOPAMINE HYDROCHLORIDE IN DEXTROSE 10 MCG/KG/MIN: 1.6 INJECTION, SOLUTION INTRAVENOUS at 06:10

## 2019-06-24 RX ADMIN — AMIODARONE HYDROCHLORIDE 150 MG: 50 INJECTION, SOLUTION INTRAVENOUS at 05:02

## 2019-06-24 RX ADMIN — IPRATROPIUM BROMIDE AND ALBUTEROL SULFATE 1 AMPULE: .5; 3 SOLUTION RESPIRATORY (INHALATION) at 19:20

## 2019-06-24 RX ADMIN — AMIODARONE HYDROCHLORIDE 0.5 MG/MIN: 50 INJECTION, SOLUTION INTRAVENOUS at 22:14

## 2019-06-24 RX ADMIN — AMIODARONE HYDROCHLORIDE 1 MG/MIN: 50 INJECTION, SOLUTION INTRAVENOUS at 13:22

## 2019-06-24 RX ADMIN — IPRATROPIUM BROMIDE AND ALBUTEROL SULFATE 1 AMPULE: .5; 3 SOLUTION RESPIRATORY (INHALATION) at 07:51

## 2019-06-24 ASSESSMENT — PULMONARY FUNCTION TESTS
PIF_VALUE: 27
PIF_VALUE: 27
PIF_VALUE: 28
PIF_VALUE: 29
PIF_VALUE: 22
PIF_VALUE: 24
PIF_VALUE: 23
PIF_VALUE: 25
PIF_VALUE: 27
PIF_VALUE: 27
PIF_VALUE: 26
PIF_VALUE: 27
PIF_VALUE: 18
PIF_VALUE: 28
PIF_VALUE: 27
PIF_VALUE: 25
PIF_VALUE: 28
PIF_VALUE: 26
PIF_VALUE: 24
PIF_VALUE: 27
PIF_VALUE: 28
PIF_VALUE: 28
PIF_VALUE: 27

## 2019-06-24 ASSESSMENT — PAIN SCALES - GENERAL
PAINLEVEL_OUTOF10: 0
PAINLEVEL_OUTOF10: 0

## 2019-06-24 NOTE — CONSULTS
week before. One of his dialysis was interrupted early because of not feeling well. Family indicates he had been coughing and bringing up some phlegm but cant tell what was the nature or color of the sputum. Found to have new onset A fib, bilateral pleural effusions with compressive atelectasis, hypoxia. Current wt 76 Kg with usual dry wt 69 Kg. On exam he is hypotensive, on pressor and BIPAP. Awake, responds cogently in [place]. Lungs coarse with dullness at both bases  Abdomen soft  Lt side weakness from prior CVA    Labs, X rays reviewed: 6/24/2019    BUN:33  Cr:3.05    WBC:6.7  Hb: 12.3  Plat: 97    Cultures:  Urine:  ·   Blood:  · 6-21-19: No growth  · 6-22-19: No growth  ·   Sputum :  ·   Wound:  ·   MRSA: 6-24-19; Negative  Mycoplasma IgM 6-23-19; 0.23    CXR 6-24-19: Fluid retention with bilateral pleural effusions. Rt>>Lt    Discussed with patient, RN, family. I have personally reviewed the past medical history, past surgical history, medications, social history, and family history, and I have updated the database accordingly.   Past Medical History:     Past Medical History:   Diagnosis Date    Atrial fibrillation ([address] 6/22/2019    Bell's palsy 9/13/2011    right     Chronic kidney disease     Contusion 9/13/2011    cheek     Depression     Diabetes mellitus ([address]     Diarrhea 2/4/2014    Frequent falls     Hemodialysis patient ([address]     Hyperkalemia 12/26/2013    Hyperlipidemia     Hypertension     Neuropathy     diabetic    Obesity     Osteoarthritis     Pneumonia     Renal cancer ([address]     s/p embolization    Renal failure     Seizures ([address]     Sprain of hip or thigh, left 9/13/2011    Unspecified cerebral artery occlusion with cerebral infarction     Wears dentures     upper and lower full       Past Surgical  History:     Past Surgical History:   Procedure Laterality Date    APPENDECTOMY      CRANIOTOMY      brain bleed    DIALYSIS FISTULA CREATION Left x 2 06/24/19 0810 -- -- -- -- 16 100 % --   06/24/19 0800 (!) 85/43 -- -- 90 14 100 % --   06/24/19 0745 (!) 84/38 -- -- 91 14 100 % --   06/24/19 0730 (!) 97/45 -- -- 103 13 100 % --   06/24/19 0715 (!) 122/51 -- -- 119 11 100 % --   06/24/19 0700 131/80 -- -- 132 18 -- --   06/24/19 0645 (!) 150/67 -- -- 139 17 -- --   06/24/19 0640 -- -- -- -- -- -- 163 lb 12.8 oz (74.3 kg)   06/24/19 0630 137/73 -- -- 139 13 98 % --   06/24/19 0625 132/64 -- -- 120 21 99 % --   06/24/19 0620 (!) 73/41 -- -- 88 -- -- --   06/24/19 0617 (!) 63/39 -- -- 87 -- -- --   06/24/19 0606 (!) 55/42 97 °F (36.1 °C) Axillary 72 -- -- --     General Appearance: Awake, alert. On BIPAP. Hypotensive  Head:  Normocephalic, no trauma  Eyes: Pupils equal, round, reactive to light and accommodation; extraocular movements intact; sclera anicteric; conjunctivae pink. No embolic phenomena. ENT: Oropharynx clear, without erythema, exudate, or thrush. No tenderness of sinuses. Mouth/throat: mucosa pink and moist. No lesions. Dentition in good repair. Neck:Supple, without lymphadenopathy. Thyroid normal, No bruits. Pulmonary/Chest: Coarse, bilateral dullness to percussion. Cardiovascular: Irregular rate and rhythm without murmurs, rubs, or gallops. Abdomen: Soft, non tender. Bowel sounds normal. No organomegaly  All four Extremities: No cyanosis, clubbing, edema, or effusions. Neurologic: No gross sensory or motor deficits. Skin: Cold and dry with good turgor. Signs of peripheral arterial insufficiency. No ulcerations. No open wounds.     Medical Decision Making -Laboratory:   I have independently reviewed/ordered the following labs:    CBC with Differential:   Recent Labs     06/23/19 0200 06/24/19 0455   WBC 4.5 6.7   HGB 11.4* 12.3*   HCT 37.6* 42.3   PLT 81* See Reflexed IPF Result   LYMPHOPCT 7* 13*   MONOPCT 17* 11*     BMP:   Recent Labs     06/23/19 0200 06/24/19 0445 06/24/19 0455   *  --  129*   K 4.1  --  4.7   CL 89*  --  91*

## 2019-06-24 NOTE — CONSULTS
infusion 500 mg Q48H   cefepime (MAXIPIME) 1 g IVPB minibag Q12H   sodium chloride flush 0.9 % injection 10 mL 2 times per day   sodium chloride flush 0.9 % injection 10 mL PRN   albuterol (PROVENTIL) nebulizer solution 2.5 mg Q2H PRN   escitalopram (LEXAPRO) tablet 10 mg Daily   folic acid (FOLVITE) tablet 1 mg Daily   levETIRAcetam (KEPPRA) tablet 500 mg BID   midodrine (PROAMATINE) tablet 10 mg PRN   mirtazapine (REMERON) tablet 45 mg Nightly   pantoprazole (PROTONIX) tablet 40 mg QAM AC   sevelamer (RENVELA) tablet 800 mg TID WC   simvastatin (ZOCOR) tablet 10 mg Nightly   ziprasidone (GEODON) capsule 40 mg BID WC   sodium chloride flush 0.9 % injection 10 mL 2 times per day   sodium chloride flush 0.9 % injection 10 mL PRN   magnesium hydroxide (MILK OF MAGNESIA) 400 MG/5ML suspension 30 mL Daily PRN   ondansetron (ZOFRAN) injection 4 mg Q6H PRN   senna (SENOKOT) tablet 8.6 mg Daily PRN   acetaminophen (TYLENOL) tablet 500 mg Q4H PRN   albuterol (PROVENTIL) nebulizer solution 2.5 mg As Directed RT PRN   insulin lispro (HUMALOG) injection vial 0-12 Units TID WC   insulin lispro (HUMALOG) injection vial 0-6 Units Nightly   glucose (GLUTOSE) 40 % oral gel 15 g PRN   dextrose 50 % IV solution PRN   glucagon (rDNA) injection 1 mg PRN   dextrose 5 % solution PRN       Allergies:  Patient has no known allergies.     Social History:    Social History     Socioeconomic History    Marital status: Single     Spouse name: Not on file    Number of children: Not on file    Years of education: Not on file    Highest education level: Not on file   Occupational History    Not on file   Social Needs    Financial resource strain: Not on file    Food insecurity:     Worry: Not on file     Inability: Not on file    Transportation needs:     Medical: Not on file     Non-medical: Not on file   Tobacco Use    Smoking status: Never Smoker    Smokeless tobacco: Never Used   Substance and Sexual Activity    Alcohol use: No    of volume overload  2. HTN: Blood pressures been on the low side, he is on low-dose pressors currently  3. DM:   4. Patient developed severe hypercarbic respiratory failure necessitating transfer down to the ICU:  5. Anemia of chronic disease :  6.  Atrial fib  7. Seizure disorder    Plan:  1. HD today, with fluid removal as tolerated. Follow renal labs   2. Strict Input and Output, Daily weigh   3. Low Potassium, Low phosphorus and low salt diet. Fluids  restricted to 1500ml/day. 4. IV Aranesp/Epogen for anemia of chronic disease with HD weekly. 5. IV Zemplar per protocol for secondary hyperparathyroidism   6. Agree with head CT, antibiotics, follow cultures  7. I suspect he will need dialysis tomorrow as well to further optimize volume status ending on how much fluid be can take off on dialysis today. Thank you for the consultation. Please do not hesitate to call with questions.     Electronically signed by Quentin Hernandez MD on 6/24/2019 at 10:43 AM

## 2019-06-24 NOTE — PLAN OF CARE
Absence of psychomotor disturbance signs and symptoms  Description  Absence of psychomotor disturbance signs and symptoms  Outcome: Ongoing     Problem: Sensory Perception - Impaired:  Goal: Demonstrations of improved sensory functioning will increase  Description  Demonstrations of improved sensory functioning will increase  Outcome: Ongoing  Goal: Decrease in sensory misperception frequency  Description  Decrease in sensory misperception frequency  Outcome: Ongoing  Goal: Able to refrain from responding to false sensory perceptions  Description  Able to refrain from responding to false sensory perceptions  Outcome: Ongoing  Goal: Demonstrates accurate environmental perceptions  Description  Demonstrates accurate environmental perceptions  Outcome: Ongoing  Goal: Able to distinguish between reality-based and nonreality-based thinking  Description  Able to distinguish between reality-based and nonreality-based thinking  Outcome: Ongoing  Goal: Able to interrupt nonreality-based thinking  Description  Able to interrupt nonreality-based thinking  Outcome: Ongoing     Problem: Sleep Pattern Disturbance:  Goal: Appears well-rested  Description  Appears well-rested  Outcome: Ongoing     Problem: Nutrition  Goal: Optimal nutrition therapy  Outcome: Not Met This Shift     Problem: Gas Exchange - Impaired:  Goal: Levels of oxygenation will improve  Description  Levels of oxygenation will improve  Outcome: Ongoing

## 2019-06-24 NOTE — FLOWSHEET NOTE
SPIRITUAL CARE DEPARTMENT - [facility] [address]  PROGRESS NOTE    Shift date: 6/23/2019  Shift day: Sunday   Shift # 3    Room # 6445  Name: Tracey Rossi            Age: 77 y.o. Gender: male          [place]: [id number] [place]: [address] [facility]    Referral: Rapid Response    Admit Date & Time: 6/21/2019  8:24 PM    PATIENT/EVENT DESCRIPTION:  rTacey Rossi is a 77 y.o. male was paged out as an \"RRT Alert,\" due to \"being found unresponsive and not breathing by bedside nurse. \" Patient was wearing breathing mask and was lethargic when  arrived to room. Patient was transferred to SICU room 106. Per report, patient is \"DNR-CCA with no intubation. \"    SPIRITUAL ASSESSMENT/INTERVENTION:   responded to page and gathered patient information outside room.  confirmed with bedside nurse that family had already been contacted.  met patient's daughter and grandson upon their arrival to unit. Patient's daughter visited with patient at bedside and patient opened his eyes when she called out, \"Dad, I'm here. \"  provided comfort, support and hospitality to patient's daughter at bedside. Patient's daughter was tearful throughout encounter. Per daughter, patient has many grandchildren and great-grandchildren, and he loves spending time with them. Patient's daughter indicated that patient Edgard Hodge been on dialysis for twenty years,\" and within the last year or two he has lived with her and she has cared for him. Patient's daughter was receptive and thanked  for visit and support. Patient has previously been visited by staff  and has been anointed, per note.  moved patient to Palliative Care list for further follow and support. SPIRITUAL CARE FOLLOW-UP PLAN:  Chaplains will remain available to offer spiritual and emotional support as needed.       Electronically signed by Rose Wynn on 6/24/2019 at 6:26 AM.  [address] [facility]

## 2019-06-24 NOTE — H&P
pressure. ABG was done showing pH of 7.1, PCO2 101, PO2 of 102. 6. pt was placed on BiPAP with IPAP 16, EPAP 8, FiO2 45. Discussed with cardiology recommend starting the patient on amiodarone. After that patient blood pressure dropped on received 250 of bolus of normal saline map around 44 discussed with the family members about the plan of transferring him to ICU for pressor support family agreed with the plan. Chest x-ray from 21, 2019 showing right greater than left multifocal airspace disease with effusions, continues on Rocephin and azithromycin. On examination patient has an equivocal bilateral pupils, sluggishly reacting to light. Patient has history of eye surgeries questionable cataract surgeries. bilateral diminished breath sounds    Per medicine note Patient appears in overall poor function with limited mobility. Concern for whether patient will be able to tolerate any intervention. Palliative consulted. Status changed to DNR CCA without intubation.             Past Medical History:        Diagnosis Date    Atrial fibrillation ([address] 6/22/2019    Bell's palsy 9/13/2011    right     Chronic kidney disease     Contusion 9/13/2011    cheek     Depression     Diabetes mellitus ([address]     Diarrhea 2/4/2014    Frequent falls     Hemodialysis patient ([address]     Hyperkalemia 12/26/2013    Hyperlipidemia     Hypertension     Neuropathy     diabetic    Obesity     Osteoarthritis     Pneumonia     Renal cancer ([address]     s/p embolization    Renal failure     Seizures ([address]     Sprain of hip or thigh, left 9/13/2011    Unspecified cerebral artery occlusion with cerebral infarction     Wears dentures     upper and lower full       Past Surgical History:        Procedure Laterality Date    APPENDECTOMY      CRANIOTOMY      brain bleed    DIALYSIS FISTULA CREATION Left x 2    LUE    ENDOSCOPY, COLON, DIAGNOSTIC      EYE SURGERY      laser    EYE SURGERY         Allergies:    No Known Assessment and Plan     Principal Problem:    Atrial fibrillation [facility])  Active Problems: Thrombocytopenia (HCC)    Generalized weakness    Schizoaffective disorder ([facility]    ESRD on hemodialysis ([facility]    Seizure disorder ([facility]    Wheelchair dependent    Pneumonia    Anemia of chronic kidney failure    History of renal cell carcinoma  Resolved Problems:    * No resolved hospital problems. *          Additional assessment:  A. fib with RVR  Hypotension  Pneumonia  Acute toxic metabolic encephalopathy secondary to hypercapnia  Acute on chronic respiratory failure with hypercapnia  End-stage renal disease on hemodialysis Monday Wednesday Friday dry weight 160 pounds current weight 168 LB's  Schizoaffective disorder      Plan:  transfer the patient to ICU  We will get central line start the patient on levophed  Wean pressors as per pt BP tolerated. History of hypertension.  Hold lisinopril, Norvasc 5 mg, Toprol-XL 50 mg   We will restart amiodarone once blood pressure stabilized for A. fib RVR  TSH WNL  Continue Eliquis 2.5 twice daily  Altered mental status likely secondary to CO2 retention  Pupils sluggish reaction and unequivocal - could be secondary to past history of eye surgery, will get CT head without contrast to rule out stroke in the setting of atrial fibrillation new onset. Continue Rocephin and azithromycin for pneumonia  Continue BiPAP, repeat ABG in 2 to 3 hours -patient is DNR CCA without intubation  DuoNeb 4 times daily, albuterol nebulization as needed  ST of depression and schizoaffective disorder, on Geodon, Remeron, Lexapro  H/o of seizures and Keppra 500 twice daily -prior level within normal limits  Microcytic anemia.  B12, folate within normal limit.  Peripheral blood smear pending  Dialysis Monday Wednesday Friday per nephrology  follow blood cultures and pneumonia work-up.         Sigrid Galicia MD      Department of Internal Medicine  [facility]

## 2019-06-25 ENCOUNTER — APPOINTMENT (OUTPATIENT)
Dept: DIALYSIS | Age: 69
DRG: 193 | End: 2019-06-25
Payer: MEDICARE

## 2019-06-25 LAB
ABSOLUTE EOS #: 0 K/UL (ref 0–0.4)
ABSOLUTE IMMATURE GRANULOCYTE: 0 K/UL (ref 0–0.3)
ABSOLUTE LYMPH #: 0.36 K/UL (ref 1–4.8)
ABSOLUTE MONO #: 0.52 K/UL (ref 0.1–0.8)
ADENOVIRUS PCR: NOT DETECTED
ANION GAP SERPL CALCULATED.3IONS-SCNC: 11 MMOL/L (ref 9–17)
ATYPICAL LYMPHOCYTE ABSOLUTE COUNT: 0.05 K/UL
ATYPICAL LYMPHOCYTES: 1 %
BASOPHILS # BLD: 1 % (ref 0–2)
BASOPHILS ABSOLUTE: 0.05 K/UL (ref 0–0.2)
BORDETELLA PERTUSSIS PCR: NOT DETECTED
BUN BLDV-MCNC: 19 MG/DL (ref 8–23)
BUN/CREAT BLD: ABNORMAL (ref 9–20)
CALCIUM SERPL-MCNC: 8.8 MG/DL (ref 8.6–10.4)
CHLAMYDIA PNEUMONIAE BY PCR: NOT DETECTED
CHLORIDE BLD-SCNC: 94 MMOL/L (ref 98–107)
CO2: 26 MMOL/L (ref 20–31)
CORONAVIRUS 229E PCR: NOT DETECTED
CORONAVIRUS HKU1 PCR: NOT DETECTED
CORONAVIRUS NL63 PCR: NOT DETECTED
CORONAVIRUS OC43 PCR: NOT DETECTED
CREAT SERPL-MCNC: 2.58 MG/DL (ref 0.7–1.2)
DIFFERENTIAL TYPE: ABNORMAL
EOSINOPHILS RELATIVE PERCENT: 0 % (ref 1–4)
GFR AFRICAN AMERICAN: 30 ML/MIN
GFR NON-AFRICAN AMERICAN: 25 ML/MIN
GFR SERPL CREATININE-BSD FRML MDRD: ABNORMAL ML/MIN/{1.73_M2}
GFR SERPL CREATININE-BSD FRML MDRD: ABNORMAL ML/MIN/{1.73_M2}
GLUCOSE BLD-MCNC: 110 MG/DL (ref 70–99)
GLUCOSE BLD-MCNC: 133 MG/DL (ref 75–110)
GLUCOSE BLD-MCNC: 150 MG/DL (ref 75–110)
GLUCOSE BLD-MCNC: 89 MG/DL (ref 75–110)
GLUCOSE BLD-MCNC: 93 MG/DL (ref 75–110)
HCT VFR BLD CALC: 34.6 % (ref 40.7–50.3)
HEMOGLOBIN: 10.5 G/DL (ref 13–17)
HUMAN METAPNEUMOVIRUS PCR: NOT DETECTED
IMMATURE GRANULOCYTES: 0 %
INFLUENZA A BY PCR: NOT DETECTED
INFLUENZA A H1 (2009) PCR: NORMAL
INFLUENZA A H1 PCR: NORMAL
INFLUENZA A H3 PCR: NORMAL
INFLUENZA B BY PCR: NOT DETECTED
INTERVENTION: NORMAL
LV EF: 55 %
LVEF MODALITY: NORMAL
LYMPHOCYTES # BLD: 7 % (ref 24–44)
MCH RBC QN AUTO: 32.5 PG (ref 25.2–33.5)
MCHC RBC AUTO-ENTMCNC: 30.3 G/DL (ref 28.4–34.8)
MCV RBC AUTO: 107.1 FL (ref 82.6–102.9)
MONOCYTES # BLD: 10 % (ref 1–7)
MORPHOLOGY: ABNORMAL
MORPHOLOGY: ABNORMAL
MYCOPLASMA PNEUMONIAE PCR: NOT DETECTED
NRBC AUTOMATED: 0 PER 100 WBC
PARAINFLUENZA 1 PCR: NOT DETECTED
PARAINFLUENZA 2 PCR: NOT DETECTED
PARAINFLUENZA 3 PCR: NOT DETECTED
PARAINFLUENZA 4 PCR: NOT DETECTED
PARTIAL THROMBOPLASTIN TIME: 47.9 SEC (ref 20.5–30.5)
PARTIAL THROMBOPLASTIN TIME: 62.1 SEC (ref 20.5–30.5)
PARTIAL THROMBOPLASTIN TIME: 81.1 SEC (ref 20.5–30.5)
PATHOLOGIST REVIEW: NORMAL
PDW BLD-RTO: 16 % (ref 11.8–14.4)
PLATELET # BLD: 79 K/UL (ref 138–453)
PLATELET ESTIMATE: ABNORMAL
PMV BLD AUTO: 12.4 FL (ref 8.1–13.5)
POTASSIUM SERPL-SCNC: 4.1 MMOL/L (ref 3.7–5.3)
RBC # BLD: 3.23 M/UL (ref 4.21–5.77)
RBC # BLD: ABNORMAL 10*6/UL
RESP SYNCYTIAL VIRUS PCR: NOT DETECTED
RHINO/ENTEROVIRUS PCR: NOT DETECTED
SEG NEUTROPHILS: 81 % (ref 36–66)
SEGMENTED NEUTROPHILS ABSOLUTE COUNT: 4.22 K/UL (ref 1.8–7.7)
SODIUM BLD-SCNC: 131 MMOL/L (ref 135–144)
SPECIMEN DESCRIPTION: NORMAL
WBC # BLD: 5.2 K/UL (ref 3.5–11.3)
WBC # BLD: ABNORMAL 10*3/UL

## 2019-06-25 PROCEDURE — 87633 RESP VIRUS 12-25 TARGETS: CPT

## 2019-06-25 PROCEDURE — 6370000000 HC RX 637 (ALT 250 FOR IP): Performed by: STUDENT IN AN ORGANIZED HEALTH CARE EDUCATION/TRAINING PROGRAM

## 2019-06-25 PROCEDURE — 85025 COMPLETE CBC W/AUTO DIFF WBC: CPT

## 2019-06-25 PROCEDURE — 5A1D70Z PERFORMANCE OF URINARY FILTRATION, INTERMITTENT, LESS THAN 6 HOURS PER DAY: ICD-10-PCS | Performed by: INTERNAL MEDICINE

## 2019-06-25 PROCEDURE — 99233 SBSQ HOSP IP/OBS HIGH 50: CPT | Performed by: INTERNAL MEDICINE

## 2019-06-25 PROCEDURE — 82947 ASSAY GLUCOSE BLOOD QUANT: CPT

## 2019-06-25 PROCEDURE — 6370000000 HC RX 637 (ALT 250 FOR IP): Performed by: INTERNAL MEDICINE

## 2019-06-25 PROCEDURE — 94660 CPAP INITIATION&MGMT: CPT

## 2019-06-25 PROCEDURE — 6360000002 HC RX W HCPCS

## 2019-06-25 PROCEDURE — 80048 BASIC METABOLIC PNL TOTAL CA: CPT

## 2019-06-25 PROCEDURE — 99222 1ST HOSP IP/OBS MODERATE 55: CPT | Performed by: PSYCHIATRY & NEUROLOGY

## 2019-06-25 PROCEDURE — 2000000000 HC ICU R&B

## 2019-06-25 PROCEDURE — 94762 N-INVAS EAR/PLS OXIMTRY CONT: CPT

## 2019-06-25 PROCEDURE — 93306 TTE W/DOPPLER COMPLETE: CPT

## 2019-06-25 PROCEDURE — 2580000003 HC RX 258: Performed by: STUDENT IN AN ORGANIZED HEALTH CARE EDUCATION/TRAINING PROGRAM

## 2019-06-25 PROCEDURE — 85730 THROMBOPLASTIN TIME PARTIAL: CPT

## 2019-06-25 PROCEDURE — 94640 AIRWAY INHALATION TREATMENT: CPT

## 2019-06-25 PROCEDURE — 6360000002 HC RX W HCPCS: Performed by: STUDENT IN AN ORGANIZED HEALTH CARE EDUCATION/TRAINING PROGRAM

## 2019-06-25 PROCEDURE — 2500000003 HC RX 250 WO HCPCS: Performed by: STUDENT IN AN ORGANIZED HEALTH CARE EDUCATION/TRAINING PROGRAM

## 2019-06-25 PROCEDURE — 87486 CHLMYD PNEUM DNA AMP PROBE: CPT

## 2019-06-25 PROCEDURE — 87798 DETECT AGENT NOS DNA AMP: CPT

## 2019-06-25 PROCEDURE — 2580000003 HC RX 258: Performed by: INTERNAL MEDICINE

## 2019-06-25 PROCEDURE — 87581 M.PNEUMON DNA AMP PROBE: CPT

## 2019-06-25 PROCEDURE — 6360000002 HC RX W HCPCS: Performed by: INTERNAL MEDICINE

## 2019-06-25 PROCEDURE — P9047 ALBUMIN (HUMAN), 25%, 50ML: HCPCS | Performed by: INTERNAL MEDICINE

## 2019-06-25 PROCEDURE — 99291 CRITICAL CARE FIRST HOUR: CPT | Performed by: INTERNAL MEDICINE

## 2019-06-25 RX ORDER — MIDODRINE HYDROCHLORIDE 5 MG/1
5 TABLET ORAL 3 TIMES DAILY
Status: COMPLETED | OUTPATIENT
Start: 2019-06-25 | End: 2019-06-25

## 2019-06-25 RX ORDER — LORAZEPAM 2 MG/ML
INJECTION INTRAMUSCULAR
Status: COMPLETED
Start: 2019-06-25 | End: 2019-06-25

## 2019-06-25 RX ORDER — LORAZEPAM 2 MG/ML
1 INJECTION INTRAMUSCULAR ONCE
Status: COMPLETED | OUTPATIENT
Start: 2019-06-25 | End: 2019-06-25

## 2019-06-25 RX ORDER — ALBUMIN (HUMAN) 12.5 G/50ML
25 SOLUTION INTRAVENOUS ONCE
Status: COMPLETED | OUTPATIENT
Start: 2019-06-25 | End: 2019-06-25

## 2019-06-25 RX ORDER — MIDAZOLAM HYDROCHLORIDE 1 MG/ML
2 INJECTION INTRAMUSCULAR; INTRAVENOUS ONCE
Status: DISCONTINUED | OUTPATIENT
Start: 2019-06-25 | End: 2019-06-25

## 2019-06-25 RX ADMIN — HEPARIN SODIUM AND DEXTROSE 12 UNITS/KG/HR: 10000; 5 INJECTION INTRAVENOUS at 18:19

## 2019-06-25 RX ADMIN — IPRATROPIUM BROMIDE AND ALBUTEROL SULFATE 1 AMPULE: .5; 3 SOLUTION RESPIRATORY (INHALATION) at 16:21

## 2019-06-25 RX ADMIN — IPRATROPIUM BROMIDE AND ALBUTEROL SULFATE 1 AMPULE: .5; 3 SOLUTION RESPIRATORY (INHALATION) at 12:27

## 2019-06-25 RX ADMIN — SIMVASTATIN 10 MG: 10 TABLET, FILM COATED ORAL at 20:47

## 2019-06-25 RX ADMIN — LORAZEPAM 1 MG: 2 INJECTION INTRAMUSCULAR; INTRAVENOUS at 02:03

## 2019-06-25 RX ADMIN — SEVELAMER CARBONATE 800 MG: 800 TABLET, FILM COATED ORAL at 09:21

## 2019-06-25 RX ADMIN — Medication 10 ML: at 20:50

## 2019-06-25 RX ADMIN — Medication 10 ML: at 20:49

## 2019-06-25 RX ADMIN — ALBUMIN (HUMAN) 25 G: 0.25 INJECTION, SOLUTION INTRAVENOUS at 11:59

## 2019-06-25 RX ADMIN — PANTOPRAZOLE SODIUM 40 MG: 40 TABLET, DELAYED RELEASE ORAL at 09:22

## 2019-06-25 RX ADMIN — CEFEPIME HYDROCHLORIDE 1 G: 1 INJECTION, POWDER, FOR SOLUTION INTRAMUSCULAR; INTRAVENOUS at 12:53

## 2019-06-25 RX ADMIN — MIDODRINE HYDROCHLORIDE 5 MG: 5 TABLET ORAL at 20:43

## 2019-06-25 RX ADMIN — IPRATROPIUM BROMIDE AND ALBUTEROL SULFATE 1 AMPULE: .5; 3 SOLUTION RESPIRATORY (INHALATION) at 19:16

## 2019-06-25 RX ADMIN — LEVETIRACETAM 500 MG: 500 TABLET, FILM COATED ORAL at 09:22

## 2019-06-25 RX ADMIN — Medication 2 MCG/MIN: at 18:30

## 2019-06-25 RX ADMIN — HEPARIN SODIUM 2000 UNITS: 1000 INJECTION, SOLUTION INTRAVENOUS; SUBCUTANEOUS at 20:52

## 2019-06-25 RX ADMIN — AMIODARONE HYDROCHLORIDE 0.5 MG/MIN: 50 INJECTION, SOLUTION INTRAVENOUS at 16:36

## 2019-06-25 RX ADMIN — INSULIN LISPRO 2 UNITS: 100 INJECTION, SOLUTION INTRAVENOUS; SUBCUTANEOUS at 18:12

## 2019-06-25 RX ADMIN — MIDODRINE HYDROCHLORIDE 5 MG: 5 TABLET ORAL at 16:37

## 2019-06-25 RX ADMIN — MIRTAZAPINE 45 MG: 30 TABLET, FILM COATED ORAL at 20:47

## 2019-06-25 RX ADMIN — MIDODRINE HYDROCHLORIDE 5 MG: 5 TABLET ORAL at 09:21

## 2019-06-25 RX ADMIN — LEVETIRACETAM 500 MG: 500 TABLET, FILM COATED ORAL at 20:43

## 2019-06-25 RX ADMIN — IPRATROPIUM BROMIDE AND ALBUTEROL SULFATE 1 AMPULE: .5; 3 SOLUTION RESPIRATORY (INHALATION) at 07:55

## 2019-06-25 RX ADMIN — LORAZEPAM 1 MG: 2 INJECTION INTRAMUSCULAR at 02:03

## 2019-06-25 RX ADMIN — SEVELAMER CARBONATE 800 MG: 800 TABLET, FILM COATED ORAL at 12:57

## 2019-06-25 RX ADMIN — SEVELAMER CARBONATE 800 MG: 800 TABLET, FILM COATED ORAL at 18:17

## 2019-06-25 RX ADMIN — ESCITALOPRAM OXALATE 10 MG: 10 TABLET ORAL at 12:54

## 2019-06-25 RX ADMIN — FOLIC ACID 1 MG: 1 TABLET ORAL at 09:22

## 2019-06-25 RX ADMIN — Medication 10 ML: at 09:21

## 2019-06-25 ASSESSMENT — PULMONARY FUNCTION TESTS
PIF_VALUE: 26
PIF_VALUE: 26
PIF_VALUE: 25
PIF_VALUE: 27
PIF_VALUE: 27
PIF_VALUE: 23
PIF_VALUE: 24
PIF_VALUE: 28
PIF_VALUE: 24
PIF_VALUE: 23
PIF_VALUE: 26
PIF_VALUE: 25
PIF_VALUE: 24
PIF_VALUE: 24
PIF_VALUE: 27
PIF_VALUE: 2
PIF_VALUE: 23
PIF_VALUE: 25
PIF_VALUE: 26
PIF_VALUE: 24
PIF_VALUE: 25
PIF_VALUE: 27
PIF_VALUE: 21
PIF_VALUE: 26
PIF_VALUE: 27
PIF_VALUE: 24
PIF_VALUE: 25
PIF_VALUE: 27
PIF_VALUE: 24
PIF_VALUE: 24
PIF_VALUE: 26
PIF_VALUE: 26
PIF_VALUE: 27
PIF_VALUE: 24
PIF_VALUE: 2
PIF_VALUE: 24
PIF_VALUE: 26
PIF_VALUE: 25
PIF_VALUE: 18
PIF_VALUE: 25
PIF_VALUE: 23
PIF_VALUE: 25
PIF_VALUE: 26
PIF_VALUE: 25
PIF_VALUE: 26
PIF_VALUE: 26
PIF_VALUE: 25
PIF_VALUE: 24
PIF_VALUE: 23
PIF_VALUE: 26
PIF_VALUE: 24
PIF_VALUE: 26
PIF_VALUE: 25
PIF_VALUE: 26
PIF_VALUE: 26
PIF_VALUE: 25
PIF_VALUE: 27
PIF_VALUE: 20
PIF_VALUE: 23
PIF_VALUE: 21
PIF_VALUE: 27
PIF_VALUE: 25
PIF_VALUE: 26
PIF_VALUE: 24
PIF_VALUE: 23
PIF_VALUE: 20
PIF_VALUE: 23
PIF_VALUE: 27
PIF_VALUE: 27
PIF_VALUE: 23
PIF_VALUE: 25
PIF_VALUE: 26
PIF_VALUE: 27
PIF_VALUE: 25
PIF_VALUE: 24

## 2019-06-25 ASSESSMENT — PAIN SCALES - GENERAL
PAINLEVEL_OUTOF10: 0
PAINLEVEL_OUTOF10: 0

## 2019-06-25 NOTE — PROGRESS NOTES
Hypertension     Neuropathy     diabetic    Obesity     Osteoarthritis     Pneumonia     Renal [facility] ([facility]     s/p embolization    Renal failure     Seizures (HCC)     Sprain of hip or thigh, left 9/13/2011    Unspecified cerebral artery occlusion with cerebral infarction     Wears dentures     upper and lower full       Past Surgical  History:     Past Surgical History:   Procedure Laterality Date    APPENDECTOMY      CRANIOTOMY      brain bleed    DIALYSIS FISTULA CREATION Left x 2    LUE    ENDOSCOPY, COLON, DIAGNOSTIC      EYE SURGERY      laser    EYE SURGERY         Medications:      midodrine  5 mg Oral TID    albumin human  25 g Intravenous Once    ipratropium-albuterol  1 ampule Inhalation 4x daily    cefepime  1 g Intravenous Q24H    sodium chloride flush  10 mL Intravenous 2 times per day    escitalopram  10 mg Oral Daily    folic acid  1 mg Oral Daily    levETIRAcetam  500 mg Oral BID    mirtazapine  45 mg Oral Nightly    pantoprazole  40 mg Oral QAM AC    sevelamer  800 mg Oral TID WC    simvastatin  10 mg Oral Nightly    ziprasidone  40 mg Oral BID WC    sodium chloride flush  10 mL Intravenous 2 times per day    insulin lispro  0-12 Units Subcutaneous TID WC    insulin lispro  0-6 Units Subcutaneous Nightly       Social History:     Social History     Socioeconomic History    Marital status: Single     Spouse name: Not on file    Number of children: Not on file    Years of education: Not on file    Highest education level: Not on file   Occupational History    Not on file   Social Needs    Financial resource strain: Not on file    Food insecurity:     Worry: Not on file     Inability: Not on file    Transportation needs:     Medical: Not on file     Non-medical: Not on file   Tobacco Use    Smoking status: Never Smoker    Smokeless tobacco: Never Used   Substance and Sexual Activity    Alcohol use: No    Drug use: No    Sexual activity: Not on file midline shift.  No abnormal extra-axial fluid collection.  Lucencies within   the periventricular and subcortical white matter likely represent chronic   microvascular ischemic changes.   The gray-white differentiation is   maintained without evidence of an acute infarct.  There is no evidence of   hydrocephalus.       ORBITS: The visualized portion of the orbits demonstrate no acute abnormality.       SINUSES: Stable opacification right-sided mastoid air cells.       SOFT TISSUES/SKULL:  Postoperative changes from left parietal craniotomy.           Impression   No acute intracranial abnormality.       Chronic microvascular ischemic changes.       Stable right mastoid effusion. EXAMINATION:   ONE XRAY VIEW OF THE CHEST       6/24/2019 6:10 am       COMPARISON:   06/21/2019       HISTORY:   ORDERING SYSTEM PROVIDED HISTORY: AMS   TECHNOLOGIST PROVIDED HISTORY:   AMS       FINDINGS:   Large right pleural effusion with possible underlying consolidation/mass and   small left pleural effusion both showing mild interval increase.  Findings   obscured right heart border.  Aortic knob calcifications unchanged.  No   mediastinal widening.  Vascular congestion.  No pneumothorax.  Bones grossly   intact.           Impression   Large right pleural effusion.  This may obscure underlying infiltrate/mass. Small left pleural effusion.  Both show some mild interval increase   particularly on the right.           Medical Decision [id number]:       Medical Decision Making-Other:     Note: Daily decision making includes:  Review of labs, medications, personal examination of radiologic studies  Review of Infection Control Prevention measures. Review of Antibiotic Stewardship data, when applicable.   Discussion with nursing, wound care and discharge planning Staff, as applicable  Review of discharge planning  Determination of need for outpatient follow up  Determination of prognosis: Guarded  Review of

## 2019-06-25 NOTE — PROGRESS NOTES
NEPHROLOGY PROGRESS NOTE      SUBJECTIVE     Hospitalized with progressive weakness and altered mental sensorium following hemodialysis. Assessment showed evidence of atrial fibrillation with bilateral effusions and suspected underlying pneumonia. Hospital course complicated by further worsening respiratory status and hypotension needing noninvasive ventilation and pressor support. Had hemodialysis yesterday using salt poor albumin. 1.5 kg taken off. His dry weight as an outpatient is 71 and is around 73-75 here. Continues to be short of breath on noninvasive ventilation. Still on pressors. OBJECTIVE     Vitals:    06/25/19 0745 06/25/19 0756 06/25/19 0800 06/25/19 0815   BP: (!) 108/47  (!) 92/39 (!) 90/40   Pulse: 101  94 94   Resp: 14 17 16 16   Temp:       TempSrc:       SpO2: 99%  100% 100%   Weight:       Height:         24HR INTAKE/OUTPUT:      Intake/Output Summary (Last 24 hours) at 6/25/2019 0904  Last data filed at 6/25/2019 0800  Gross per 24 hour   Intake 1564 ml   Output --   Net 1564 ml       General appearance: Noninvasive ventilation  HEENT: PERRLA  Respiratory::vesicular breath sounds harsh reduced air entry  Cardiovascular:S1 S2 normal,no gallop or organic murmur. Abdomen:Non tender/non distended. Bowel sounds present  Extremities: No Cyanosis or Clubbing,Lower extremity edema        MEDICATIONS     Scheduled Meds:    midodrine  5 mg Oral TID    ipratropium-albuterol  1 ampule Inhalation 4x daily    cefepime  1 g Intravenous Q24H    sodium chloride flush  10 mL Intravenous 2 times per day    escitalopram  10 mg Oral Daily    folic acid  1 mg Oral Daily    levETIRAcetam  500 mg Oral BID    mirtazapine  45 mg Oral Nightly    pantoprazole  40 mg Oral QAM AC    sevelamer  800 mg Oral TID WC    simvastatin  10 mg Oral Nightly    ziprasidone  40 mg Oral BID WC    sodium chloride flush  10 mL Intravenous 2 times per day    insulin lispro  0-12 Units Subcutaneous TID WC   

## 2019-06-25 NOTE — PLAN OF CARE
Problem: Falls - Risk of:  Goal: Will remain free from falls  Description  Will remain free from falls  Outcome: Met This Shift     Problem: Falls - Risk of:  Goal: Absence of physical injury  Description  Absence of physical injury  Outcome: Met This Shift     Problem: Risk for Impaired Skin Integrity  Goal: Tissue integrity - skin and mucous membranes  Description  Structural intactness and normal physiological function of skin and  mucous membranes.   Outcome: Ongoing     Problem: Injury - Risk of, Physical Injury:  Goal: Will remain free from falls  Description  Will remain free from falls  Outcome: Met This Shift     Problem: Injury - Risk of, Physical Injury:  Goal: Absence of physical injury  Description  Absence of physical injury  Outcome: Met This Shift

## 2019-06-25 NOTE — PROGRESS NOTES
06/25/19 0245 (!) 112/44 -- -- 104 16 100 % --   06/25/19 0230 (!) 111/46 -- -- 103 17 100 % --   06/25/19 0215 114/80 -- -- 103 20 100 % --   06/25/19 0200 118/70 -- -- 110 22 100 % --   06/25/19 0145 (!) 116/50 -- -- 106 21 100 % --   06/25/19 0130 (!) 112/53 -- -- 106 24 100 % --   06/25/19 0115 (!) 110/54 -- -- 111 (!) 32 96 % --   06/25/19 0100 (!) 111/42 -- -- 107 18 90 % --   06/25/19 0045 (!) 104/43 -- -- 104 23 -- --   06/25/19 0030 (!) 106/46 -- -- 105 23 -- --   06/25/19 0015 (!) 110/53 -- -- 107 20 96 % --   06/25/19 0000 106/67 98.8 °F (37.1 °C) Axillary 109 15 98 % --   06/24/19 2345 (!) 108/48 -- -- 111 22 97 % --         Intake/Output Summary (Last 24 hours) at 6/25/2019 0730  Last data filed at 6/25/2019 0400  Gross per 24 hour   Intake 1378 ml   Output --   Net 1378 ml     Date 06/25/19 0000 - 06/25/19 2359   Shift 1345-4821 8980-3899 1559-7550 24 Hour Total   INTAKE   I.V.(mL/kg) 583(7.6)   583(7.6)   Shift Total(mL/kg) 583(7.6)   583(7.6)   OUTPUT   Shift Total(mL/kg)       Weight (kg) 76.3 76.3 76.3 76.3     Wt Readings from Last 3 Encounters:   06/25/19 168 lb 3.4 oz (76.3 kg)   05/14/19 165 lb (74.8 kg)   02/25/19 152 lb (68.9 kg)     Body mass index is 24.84 kg/m².         PHYSICAL EXAM:  GEN:  Somnolent but follows commands  EYES:  lids and lashes normal  HEENT:  Normocepalic, without obvious abnormality  LUNGS:  On BiPap equal chest rise and fall, mild increase work of breathing   CV:    irregularly irregular rhythm  ABDOMEN:   soft, non-distended and non-tender  MSK:    there is no redness, warmth, or swelling of the joints  NEURO[de-identified]   Awake, oriented to name, sensory intact  SKIN:   No redness, warmth or swelling  EXTREMITIES:  No pedal or leg edema, no calf tenderness/swelling, no erythema, distal pulses intact       MEDICATIONS:  Scheduled Meds:   ipratropium-albuterol  1 ampule Inhalation 4x daily    cefepime  1 g Intravenous Q24H    sodium chloride flush  10 mL Intravenous 2 times

## 2019-06-25 NOTE — PLAN OF CARE
Problem: Falls - Risk of:  Goal: Will remain free from falls  Description  Will remain free from falls  6/24/2019 2111 by Ivone Heller RN  Outcome: Ongoing  6/24/2019 1457 by Kenyon Estrella RN  Outcome: Ongoing  Goal: Absence of physical injury  Description  Absence of physical injury  6/24/2019 2111 by Ivone Heller RN  Outcome: Ongoing  6/24/2019 1457 by Kenyon Estrella RN  Outcome: Ongoing     Problem: Risk for Impaired Skin Integrity  Goal: Tissue integrity - skin and mucous membranes  Description  Structural intactness and normal physiological function of skin and  mucous membranes.   6/24/2019 2111 by Ivone Heller RN  Outcome: Ongoing  6/24/2019 1457 by Kenyon Estrella RN  Outcome: Ongoing     Problem: Suicide risk  Description  Suicide risk  Goal: Provide patient with safe environment  Description  Provide patient with safe environment  6/24/2019 2111 by Ivone Heller RN  Outcome: Ongoing  6/24/2019 1457 by Kenyon Estrella RN  Outcome: Ongoing     Problem: Confusion - Acute:  Goal: Absence of continued neurological deterioration signs and symptoms  Description  Absence of continued neurological deterioration signs and symptoms  6/24/2019 2111 by Ivone Heller RN  Outcome: Ongoing  6/24/2019 1457 by Kenyon Estrella RN  Outcome: Ongoing  Goal: Mental status will be restored to baseline  Description  Mental status will be restored to baseline  6/24/2019 2111 by Ivone Heller RN  Outcome: Ongoing  6/24/2019 1457 by Kenyon Estrella RN  Outcome: Ongoing     Problem: Discharge Planning:  Goal: Ability to perform activities of daily living will improve  Description  Ability to perform activities of daily living will improve  Outcome: Ongoing  Goal: Participates in care planning  Description  Participates in care planning  Outcome: Ongoing  Goal: Discharged to appropriate level of care  Description  Discharged to appropriate level of care  Outcome: Ongoing     Problem: Injury - Risk of,

## 2019-06-25 NOTE — FLOWSHEET NOTE
Placed back on bipap due to spo2 85% on 4l NC. Pt was able to successfully take oral meds with supplement.

## 2019-06-25 NOTE — CONSULTS
atrophy and moderate chronic microvascular ischemic changes of cerebral white matter scattered chronic lacunar infarct of the brainstem thalami and basal ganglia present. Other medications include Lexapro 10 mg, 140 mg twice daily, Zocor 10 mg around 45 mg qhs. Patient is seen at bedside, on BiPAP, appears drowsy however wakes up on stimulus. Able to identify daughter but as per the RN waxes and wanes. No current facility-administered medications on file prior to encounter. Current Outpatient Medications on File Prior to Encounter   Medication Sig Dispense Refill    acetaminophen (SM PAIN RELIEVER) 325 MG tablet Take 2 tablets by mouth every 6 hours as needed for Pain 120 tablet 2    ziprasidone (GEODON) 40 MG capsule Take 1 capsule by mouth 2 times daily (with meals) 60 capsule 2    folic acid (FOLVITE) 1 MG tablet Take 1 tablet by mouth daily 30 tablet 2    levETIRAcetam (KEPPRA) 500 MG tablet Take 1 tablet by mouth 2 times daily 60 tablet 2    Lancets MISC TEST BLOOD SUGAR TWICE DAILY 100 each 5    simvastatin (ZOCOR) 10 MG tablet Take 1 tablet by mouth nightly 30 tablet 2    metoprolol succinate (TOPROL XL) 50 MG extended release tablet Take 1 tablet by mouth nightly 30 tablet 2    escitalopram (LEXAPRO) 10 MG tablet Take 1 tablet by mouth daily 30 tablet 2    lisinopril (PRINIVIL;ZESTRIL) 10 MG tablet Take 1 tablet by mouth daily 30 tablet 2    clopidogrel (PLAVIX) 75 MG tablet Take 1 tablet by mouth daily 30 tablet 2    amLODIPine (NORVASC) 5 MG tablet Take 1 tablet by mouth daily 30 tablet 2    omeprazole (PRILOSEC) 20 MG delayed release capsule Take 1 capsule by mouth Daily 30 capsule 2    sevelamer (RENVELA) 800 MG tablet   6    mirtazapine (REMERON) 45 MG tablet Take 1 tablet by mouth nightly 30 tablet 3    blood glucose test strips (TRUE METRIX BLOOD GLUCOSE TEST) strip 1 each by In Vitro route 3 times daily As needed.  100 each 5    ondansetron (ZOFRAN) 4 MG tablet Take 1 XUQ:950   ; Diastolic (22QRD), ZDT:66, Min:39, Max:105      Continuous infusions:    amiodarone 0.5 mg/min (06/24/19 0724)    norepinephrine 5 mcg/min (06/25/19 1445)    heparin (porcine) 12 Units/kg/hr (06/25/19 0625)    dextrose                      EXAMINATION:  GENERAL    SKIN  Appears comfortable and in no distress, on bipap    No gross lesions, rashes, bruising or bleeding on exposed skin area   HEENT NC/ AT  Eyes:no icterus, redness, pupils equal and reactive, extraocular eye movements intact, conjunctiva clear  Ear: normal external ear, no discharge, hearing intact  Nose:  no drainage noted  Mouth: mucous membranes moist   NECK  LUNGS  Supple and no bruits heard  Clear to auscultation,b/l   Cardiovascular  Abdomen  S1, S2 heard; radial pulse intact,RRR  Soft,non-tender,no organomegaly,BS+   MENTAL STATUS:  Alert, oriented to self dose  recognize family at bedside. speech normal   CRANIAL NERVES: II     -       Pupils reactive b/l visual acuity: 20/30 OU; Fundus exam: intact venous pulsations;  Visual fields intact to confrontation  III,IV,VI -  EOMs full, no afferent defect, no                      ELIZABETH, no ptosis  V     -     Normal facial sensation  VII    -     Normal facial symmetry  VIII   -     Intact hearing  IX,X -     Symmetrical palate  XI    -     Symmetrical shoulder shrug  XII   -     Midline tongue, no atrophy    MOTOR FUNCTION:  Bulk R side:Normal            L side:Normal  Tone  R side:Normal            L side:Normal   Power 5/5 in RUE 4/5 in LUE, able to move Lower extremities b/l but unable to antigraviate them for me on exam however did move antigravity for RN    no involuntary movements, no tremor     SENSORY FUNCTION:                      Extremities: Touch     R side:Normal                 L side:Normal     Pain        R side:Normal                  L side:Normal  Vibration  R side:Normal                  L side:Normal    Proprioception    R side:Normal                             L side:Normal    Peripheral pulses palpable, no pedal edema or calf pain with palpation   CEREBELLAR FUNCTION:  Heel to Shin:     Unable to eprform pt didn't co-operate    Finger to Nose:   Unable to perform pt didn't co-operate           REFLEX FUNCTION:  Symmetric, no perverted reflex,      Right Bicep:  2+  Left Bicep:  2+  Right Knee:  2+  Left Knee:  2+    Babinski sign   Right side:Downgoing                          Left side   :Downgoing   STATION and GAIT  Not tested           Data:    Lab Results:     Lab Results   Component Value Date    CHOL 79 05/24/2018    LDLCHOLESTEROL 31 05/24/2018    HDL 40 (L) 05/24/2018    TRIG 41 05/24/2018    ALT 26 05/24/2018    AST 30 05/24/2018    TSH 2.12 06/22/2019    INR 1.2 05/14/2019    LABA1C 4.4 05/24/2018    [id number] 1127 06/22/2019     Hematology:  Recent Labs     06/24/19  0455 06/24/19  1419 06/25/19  0518   WBC 6.7 9.4 5.2   HGB 12.3* 12.7* 10.5*   HCT 42.3 41.1 34.6*   PLT See Reflexed IPF Result 95* 79*     Chemistry:  Recent Labs     06/23/19  0200 06/24/19  0445 06/24/19  0455 06/25/19  0518   *  --  129* 131*   K 4.1  --  4.7 4.1   CL 89*  --  91* 94*   CO2 27  --  21 26   GLUCOSE 150*  --  143* 110*   BUN 27*  --  33* 19   CREATININE 3.20* 4.14* 3.85* 2.58*   MG  --   --  2.3  --    CALCIUM 8.9  --  9.2 8.8     No results for input(s): PROT, LABALBU, LABA1C, C5UBCJH, X3AIQIX, FT4, TSH, AST, ALT, LDH, AMMONIA, CHOL, HDL, LDLCHOLESTEROL, CHOLHDLRATIO, TRIG, VLDL, CKTOTAL, CKMB, CKMBINDEX, RF, ARTHUR in the last 72 hours. Lab Results   Component Value Date    PHENYTOIN 2.6 (L) 02/10/2018                 Impression and Plan:     Mr. Afshan Ashton is a 77 y.o. male with past medical history of hypertension, hyperlipidemia, depression, CVA, Bell's palsy, seizure disorder, hemodialysis MWF due to generalized weakness since 2 days.   CT head doen this am showed no acute intracranial abnormality    MRI brain done 8/1/2017 showed no evidence of acute intracranial abnormality, global atrophy and moderate chronic microvascular ischemic changes of cerebral white matter scattered chronic lacunar infarct of the brainstem thalami and basal ganglia present. FLAIR                                     DWI                                                 ABCD    On Plavix 75mg and Zocor 10mg due to prior stroke  New onset of A.fib on was started Eliquis  but due to rapid response started on amiodarone instead  Hx of seiziure on keppra 500 mg bid  Will do limited MRI scan to rule out stroke    Discussed with   Will follow with you. Thank you for consultation.          Adam Cool MD Pager: [phone]  Electronically signed 6/25/2019 at 3:38 PM

## 2019-06-25 NOTE — FLOWSHEET NOTE
Dr. Maria Guadalupe Armenta, Dr. Ann Alcala and Dr. Yasmeen Eduardo at bedside to assess pt after reported droop of left side of mouth.

## 2019-06-26 ENCOUNTER — APPOINTMENT (OUTPATIENT)
Dept: GENERAL RADIOLOGY | Age: 69
DRG: 193 | End: 2019-06-26
Payer: MEDICARE

## 2019-06-26 LAB
ABSOLUTE EOS #: 0.05 K/UL (ref 0–0.4)
ABSOLUTE IMMATURE GRANULOCYTE: 0 K/UL (ref 0–0.3)
ABSOLUTE LYMPH #: 0.38 K/UL (ref 1–4.8)
ABSOLUTE MONO #: 0.56 K/UL (ref 0.1–0.8)
ALLEN TEST: ABNORMAL
ANION GAP SERPL CALCULATED.3IONS-SCNC: 11 MMOL/L (ref 9–17)
BASOPHILS # BLD: 0 % (ref 0–2)
BASOPHILS ABSOLUTE: 0 K/UL (ref 0–0.2)
BUN BLDV-MCNC: 31 MG/DL (ref 8–23)
BUN/CREAT BLD: ABNORMAL (ref 9–20)
CALCIUM SERPL-MCNC: 9.5 MG/DL (ref 8.6–10.4)
CHLORIDE BLD-SCNC: 92 MMOL/L (ref 98–107)
CO2: 26 MMOL/L (ref 20–31)
CREAT SERPL-MCNC: 3.45 MG/DL (ref 0.7–1.2)
DIFFERENTIAL TYPE: ABNORMAL
EOSINOPHILS RELATIVE PERCENT: 1 % (ref 1–4)
FIO2: 30
GFR AFRICAN AMERICAN: 21 ML/MIN
GFR NON-AFRICAN AMERICAN: 18 ML/MIN
GFR SERPL CREATININE-BSD FRML MDRD: ABNORMAL ML/MIN/{1.73_M2}
GFR SERPL CREATININE-BSD FRML MDRD: ABNORMAL ML/MIN/{1.73_M2}
GLUCOSE BLD-MCNC: 115 MG/DL (ref 75–110)
GLUCOSE BLD-MCNC: 119 MG/DL (ref 75–110)
GLUCOSE BLD-MCNC: 120 MG/DL (ref 70–99)
GLUCOSE BLD-MCNC: 156 MG/DL (ref 75–110)
GLUCOSE BLD-MCNC: 189 MG/DL (ref 75–110)
HCO3 VENOUS: 27 MMOL/L (ref 22–29)
HCT VFR BLD CALC: 32 % (ref 40.7–50.3)
HEMOGLOBIN: 10 G/DL (ref 13–17)
IMMATURE GRANULOCYTES: 0 %
LYMPHOCYTES # BLD: 8 % (ref 24–44)
MAGNESIUM: 2.2 MG/DL (ref 1.6–2.6)
MCH RBC QN AUTO: 33.2 PG (ref 25.2–33.5)
MCHC RBC AUTO-ENTMCNC: 31.3 G/DL (ref 28.4–34.8)
MCV RBC AUTO: 106.3 FL (ref 82.6–102.9)
MODE: ABNORMAL
MONOCYTES # BLD: 12 % (ref 1–7)
MORPHOLOGY: ABNORMAL
MORPHOLOGY: ABNORMAL
NEGATIVE BASE EXCESS, VEN: 1 (ref 0–2)
NRBC AUTOMATED: 0 PER 100 WBC
O2 DEVICE/FLOW/%: ABNORMAL
O2 SAT, VEN: 72 % (ref 60–85)
PARTIAL THROMBOPLASTIN TIME: 36.2 SEC (ref 20.5–30.5)
PARTIAL THROMBOPLASTIN TIME: 50.9 SEC (ref 20.5–30.5)
PARTIAL THROMBOPLASTIN TIME: 84.7 SEC (ref 20.5–30.5)
PARTIAL THROMBOPLASTIN TIME: >120 SEC (ref 20.5–30.5)
PATIENT TEMP: ABNORMAL
PCO2, VEN: 61.1 MM HG (ref 41–51)
PDW BLD-RTO: 16.1 % (ref 11.8–14.4)
PH VENOUS: 7.25 (ref 7.32–7.43)
PLATELET # BLD: 71 K/UL (ref 138–453)
PLATELET ESTIMATE: ABNORMAL
PMV BLD AUTO: 11.5 FL (ref 8.1–13.5)
PO2, VEN: 44.8 MM HG (ref 30–50)
POC PCO2 TEMP: ABNORMAL MM HG
POC PH TEMP: ABNORMAL
POC PO2 TEMP: ABNORMAL MM HG
POSITIVE BASE EXCESS, VEN: ABNORMAL (ref 0–3)
POTASSIUM SERPL-SCNC: 3.2 MMOL/L (ref 3.7–5.3)
RBC # BLD: 3.01 M/UL (ref 4.21–5.77)
RBC # BLD: ABNORMAL 10*6/UL
SAMPLE SITE: ABNORMAL
SEG NEUTROPHILS: 79 % (ref 36–66)
SEGMENTED NEUTROPHILS ABSOLUTE COUNT: 3.71 K/UL (ref 1.8–7.7)
SODIUM BLD-SCNC: 129 MMOL/L (ref 135–144)
TOTAL CO2, VENOUS: 29 MMOL/L (ref 23–30)
WBC # BLD: 4.7 K/UL (ref 3.5–11.3)
WBC # BLD: ABNORMAL 10*3/UL

## 2019-06-26 PROCEDURE — 85025 COMPLETE CBC W/AUTO DIFF WBC: CPT

## 2019-06-26 PROCEDURE — 2000000000 HC ICU R&B

## 2019-06-26 PROCEDURE — 2500000003 HC RX 250 WO HCPCS: Performed by: STUDENT IN AN ORGANIZED HEALTH CARE EDUCATION/TRAINING PROGRAM

## 2019-06-26 PROCEDURE — 90935 HEMODIALYSIS ONE EVALUATION: CPT

## 2019-06-26 PROCEDURE — 6360000002 HC RX W HCPCS: Performed by: STUDENT IN AN ORGANIZED HEALTH CARE EDUCATION/TRAINING PROGRAM

## 2019-06-26 PROCEDURE — 6370000000 HC RX 637 (ALT 250 FOR IP): Performed by: INTERNAL MEDICINE

## 2019-06-26 PROCEDURE — 6370000000 HC RX 637 (ALT 250 FOR IP): Performed by: STUDENT IN AN ORGANIZED HEALTH CARE EDUCATION/TRAINING PROGRAM

## 2019-06-26 PROCEDURE — 0BH17EZ INSERTION OF ENDOTRACHEAL AIRWAY INTO TRACHEA, VIA NATURAL OR ARTIFICIAL OPENING: ICD-10-PCS | Performed by: RADIOLOGY

## 2019-06-26 PROCEDURE — 99233 SBSQ HOSP IP/OBS HIGH 50: CPT | Performed by: INTERNAL MEDICINE

## 2019-06-26 PROCEDURE — 2700000000 HC OXYGEN THERAPY PER DAY

## 2019-06-26 PROCEDURE — 2580000003 HC RX 258: Performed by: STUDENT IN AN ORGANIZED HEALTH CARE EDUCATION/TRAINING PROGRAM

## 2019-06-26 PROCEDURE — 82803 BLOOD GASES ANY COMBINATION: CPT

## 2019-06-26 PROCEDURE — 80048 BASIC METABOLIC PNL TOTAL CA: CPT

## 2019-06-26 PROCEDURE — 85730 THROMBOPLASTIN TIME PARTIAL: CPT

## 2019-06-26 PROCEDURE — 94660 CPAP INITIATION&MGMT: CPT

## 2019-06-26 PROCEDURE — 71045 X-RAY EXAM CHEST 1 VIEW: CPT

## 2019-06-26 PROCEDURE — 99291 CRITICAL CARE FIRST HOUR: CPT | Performed by: INTERNAL MEDICINE

## 2019-06-26 PROCEDURE — 99232 SBSQ HOSP IP/OBS MODERATE 35: CPT | Performed by: PSYCHIATRY & NEUROLOGY

## 2019-06-26 PROCEDURE — 83735 ASSAY OF MAGNESIUM: CPT

## 2019-06-26 PROCEDURE — 2580000003 HC RX 258: Performed by: INTERNAL MEDICINE

## 2019-06-26 PROCEDURE — 94640 AIRWAY INHALATION TREATMENT: CPT

## 2019-06-26 PROCEDURE — 82947 ASSAY GLUCOSE BLOOD QUANT: CPT

## 2019-06-26 PROCEDURE — 94761 N-INVAS EAR/PLS OXIMETRY MLT: CPT

## 2019-06-26 RX ORDER — MIDODRINE HYDROCHLORIDE 5 MG/1
5 TABLET ORAL 3 TIMES DAILY
Status: DISCONTINUED | OUTPATIENT
Start: 2019-06-26 | End: 2019-06-26

## 2019-06-26 RX ORDER — MIDODRINE HYDROCHLORIDE 5 MG/1
10 TABLET ORAL 3 TIMES DAILY
Status: DISPENSED | OUTPATIENT
Start: 2019-06-26 | End: 2019-06-27

## 2019-06-26 RX ORDER — AMIODARONE HYDROCHLORIDE 200 MG/1
200 TABLET ORAL 2 TIMES DAILY
Status: DISCONTINUED | OUTPATIENT
Start: 2019-06-26 | End: 2019-07-05

## 2019-06-26 RX ORDER — LEVETIRACETAM 500 MG/1
500 TABLET ORAL DAILY
Status: DISCONTINUED | OUTPATIENT
Start: 2019-06-27 | End: 2019-07-05

## 2019-06-26 RX ADMIN — ZIPRASIDONE HYDROCHLORIDE 40 MG: 40 CAPSULE ORAL at 16:29

## 2019-06-26 RX ADMIN — IPRATROPIUM BROMIDE AND ALBUTEROL SULFATE 1 AMPULE: .5; 3 SOLUTION RESPIRATORY (INHALATION) at 08:05

## 2019-06-26 RX ADMIN — CEFEPIME HYDROCHLORIDE 1 G: 1 INJECTION, POWDER, FOR SOLUTION INTRAMUSCULAR; INTRAVENOUS at 11:30

## 2019-06-26 RX ADMIN — AMIODARONE HYDROCHLORIDE 200 MG: 200 TABLET ORAL at 20:28

## 2019-06-26 RX ADMIN — LEVETIRACETAM 500 MG: 500 TABLET, FILM COATED ORAL at 08:56

## 2019-06-26 RX ADMIN — FOLIC ACID 1 MG: 1 TABLET ORAL at 08:56

## 2019-06-26 RX ADMIN — PANTOPRAZOLE SODIUM 40 MG: 40 TABLET, DELAYED RELEASE ORAL at 08:56

## 2019-06-26 RX ADMIN — MIDODRINE HYDROCHLORIDE 10 MG: 5 TABLET ORAL at 16:29

## 2019-06-26 RX ADMIN — Medication 10 ML: at 20:31

## 2019-06-26 RX ADMIN — Medication 5.33 MCG/MIN: at 22:27

## 2019-06-26 RX ADMIN — HEPARIN SODIUM 2000 UNITS: 1000 INJECTION, SOLUTION INTRAVENOUS; SUBCUTANEOUS at 23:07

## 2019-06-26 RX ADMIN — Medication 10 ML: at 08:55

## 2019-06-26 RX ADMIN — AMIODARONE HYDROCHLORIDE 0.5 MG/MIN: 50 INJECTION, SOLUTION INTRAVENOUS at 08:53

## 2019-06-26 RX ADMIN — SEVELAMER CARBONATE 800 MG: 800 TABLET, FILM COATED ORAL at 16:29

## 2019-06-26 RX ADMIN — MIRTAZAPINE 45 MG: 30 TABLET, FILM COATED ORAL at 20:28

## 2019-06-26 RX ADMIN — IPRATROPIUM BROMIDE AND ALBUTEROL SULFATE 1 AMPULE: .5; 3 SOLUTION RESPIRATORY (INHALATION) at 15:54

## 2019-06-26 RX ADMIN — ESCITALOPRAM OXALATE 10 MG: 10 TABLET ORAL at 08:56

## 2019-06-26 RX ADMIN — SIMVASTATIN 10 MG: 10 TABLET, FILM COATED ORAL at 20:28

## 2019-06-26 RX ADMIN — Medication 10 ML: at 20:30

## 2019-06-26 RX ADMIN — IPRATROPIUM BROMIDE AND ALBUTEROL SULFATE 1 AMPULE: .5; 3 SOLUTION RESPIRATORY (INHALATION) at 19:46

## 2019-06-26 RX ADMIN — ZIPRASIDONE HYDROCHLORIDE 40 MG: 40 CAPSULE ORAL at 08:55

## 2019-06-26 RX ADMIN — Medication 10 ML: at 08:56

## 2019-06-26 RX ADMIN — IPRATROPIUM BROMIDE AND ALBUTEROL SULFATE 1 AMPULE: .5; 3 SOLUTION RESPIRATORY (INHALATION) at 11:39

## 2019-06-26 RX ADMIN — SEVELAMER CARBONATE 800 MG: 800 TABLET, FILM COATED ORAL at 08:56

## 2019-06-26 ASSESSMENT — PULMONARY FUNCTION TESTS
PIF_VALUE: 21
PIF_VALUE: 19
PIF_VALUE: 20
PIF_VALUE: 22
PIF_VALUE: 2
PIF_VALUE: 20
PIF_VALUE: 21
PIF_VALUE: 23
PIF_VALUE: 20
PIF_VALUE: 19
PIF_VALUE: 20
PIF_VALUE: 19
PIF_VALUE: 18
PIF_VALUE: 19
PIF_VALUE: 20
PIF_VALUE: 19
PIF_VALUE: 20
PIF_VALUE: 22
PIF_VALUE: 20
PIF_VALUE: 23
PIF_VALUE: 22
PIF_VALUE: 21
PIF_VALUE: 23
PIF_VALUE: 24
PIF_VALUE: 21
PIF_VALUE: 19
PIF_VALUE: 17
PIF_VALUE: 18
PIF_VALUE: 23
PIF_VALUE: 22
PIF_VALUE: 21
PIF_VALUE: 21
PIF_VALUE: 22
PIF_VALUE: 20
PIF_VALUE: 19
PIF_VALUE: 19
PIF_VALUE: 22
PIF_VALUE: 20
PIF_VALUE: 22
PIF_VALUE: 20
PIF_VALUE: 22
PIF_VALUE: 21
PIF_VALUE: 19
PIF_VALUE: 23
PIF_VALUE: 23
PIF_VALUE: 22
PIF_VALUE: 24
PIF_VALUE: 23
PIF_VALUE: 23
PIF_VALUE: 19
PIF_VALUE: 22
PIF_VALUE: 24
PIF_VALUE: 21

## 2019-06-26 ASSESSMENT — PAIN SCALES - GENERAL
PAINLEVEL_OUTOF10: 0

## 2019-06-26 NOTE — PROGRESS NOTES
ondansetron (ZOFRAN) 4 MG tablet Take 1 tablet by mouth daily as needed for Vomiting 8/28/18   Verna Sen PA-C   albuterol (PROVENTIL) (2.5 MG/3ML) 0.083% nebulizer solution Take 3 mLs by nebulization every 2 hours as needed for Wheezing 5/19/18   Angela Downing MD   ipratropium-albuterol (DUONEB) 0.5-2.5 (3) MG/3ML SOLN nebulizer solution Inhale 3 mLs into the lungs three times daily 5/19/18   Angela Downing MD   magnesium hydroxide (MILK OF MAGNESIA) 400 MG/5ML suspension Take 30 mLs by mouth daily as needed for Constipation 5/19/18   Angela Downing MD   midodrine (PROAMATINE) 10 MG tablet Take 1 tablet by mouth as needed (for systolic blood pressure less than 100) 5/19/18   Angela Downing MD   nicotine (NICODERM CQ) 21 MG/24HR Place 1 patch onto the skin daily as needed (if patient smokes/requests nicotine replacent therapy) 5/19/18   Angela Downing MD   .  Recent Surgical History: None = 0     Assessment     Peak Flow (asthma only)    Predicted: na  Personal Best: na  PEF na  % Predicted na  Peak Flow : not applicable = 0    JJN4/VRL    FEV1 Predicted na      FEV1 na    FEV1 % Predicted na  FVC na  IS volume na  IBW na    RR 20  Breath Sounds: diminished      · Bronchodilator assessment at level  3  · Hyperinflation assessment at level   · Secretion Management assessment at level    ·   · []    Bronchodilator Assessment  BRONCHODILATOR ASSESSMENT SCORE  Score 0 1 2 3 4 5   Breath Sounds   []  Patient Baseline []  No Wheeze good aeration []  Faint, scattered wheezing, good aeration [x]  Expiratory Wheezing and or moderately diminished []  Insp/Exp wheeze and/or very diminished []  Insp/Exp and/ or marked distress   Respiratory Rate   []  Patient Baseline []  Less than 20 []  Less than 20 [x]  20-25 []  Greater than 25 []  Greater than 25   Peak flow % of Pred or PB [x]  NA   []  Greater than 90%  []  81-90% []  71-80% []  Less than or equal to 70%  or unable to perform []  Unable due to Respiratory

## 2019-06-26 NOTE — PROGRESS NOTES
insulin lispro  0-6 Units Subcutaneous Nightly     Continuous Infusions:    amiodarone 0.5 mg/min (06/26/19 0853)    norepinephrine 4 mcg/min (06/26/19 0852)    heparin (porcine) 8 Units/kg/hr (06/26/19 0741)    dextrose       PRN Meds:  heparin (porcine), heparin (porcine), sodium chloride flush, albuterol, midodrine, sodium chloride flush, magnesium hydroxide, ondansetron, senna, acetaminophen, albuterol, glucose, dextrose, glucagon (rDNA), dextrose  Home Meds:                Medications Prior to Admission: acetaminophen (SM PAIN RELIEVER) 325 MG tablet, Take 2 tablets by mouth every 6 hours as needed for Pain  ziprasidone (GEODON) 40 MG capsule, Take 1 capsule by mouth 2 times daily (with meals)  folic acid (FOLVITE) 1 MG tablet, Take 1 tablet by mouth daily  levETIRAcetam (KEPPRA) 500 MG tablet, Take 1 tablet by mouth 2 times daily  Lancets MISC, TEST BLOOD SUGAR TWICE DAILY  simvastatin (ZOCOR) 10 MG tablet, Take 1 tablet by mouth nightly  metoprolol succinate (TOPROL XL) 50 MG extended release tablet, Take 1 tablet by mouth nightly  escitalopram (LEXAPRO) 10 MG tablet, Take 1 tablet by mouth daily  lisinopril (PRINIVIL;ZESTRIL) 10 MG tablet, Take 1 tablet by mouth daily  clopidogrel (PLAVIX) 75 MG tablet, Take 1 tablet by mouth daily  amLODIPine (NORVASC) 5 MG tablet, Take 1 tablet by mouth daily  omeprazole (PRILOSEC) 20 MG delayed release capsule, Take 1 capsule by mouth Daily  sevelamer (RENVELA) 800 MG tablet,   mirtazapine (REMERON) 45 MG tablet, Take 1 tablet by mouth nightly  blood glucose test strips (TRUE METRIX BLOOD GLUCOSE TEST) strip, 1 each by In Vitro route 3 times daily As needed.   ondansetron (ZOFRAN) 4 MG tablet, Take 1 tablet by mouth daily as needed for Vomiting  albuterol (PROVENTIL) (2.5 MG/3ML) 0.083% nebulizer solution, Take 3 mLs by nebulization every 2 hours as needed for Wheezing  ipratropium-albuterol (DUONEB) 0.5-2.5 (3) MG/3ML SOLN nebulizer solution, Inhale 3 mLs into the

## 2019-06-26 NOTE — PROGRESS NOTES
enlarged, unchanged. Right-greater-than-left multifocal airspace disease with effusions.  Asymmetric edema is favored over pneumonia     ASSESSMENT:     Patient Active Problem List    Diagnosis Date Noted    Thrombocytopenia ([facility] [address] 06/22/2019     Priority: High    Renal cell carcinoma ([address] 08/26/2013     Priority: High    Hypertension 09/13/2011     Priority: High    Generalized weakness 08/10/2016     Priority: Low     Class: Acute    Elevated troponin     Pleural effusion, right     Acute respiratory failure with hypoxia and hypercapnia (HCC)     Atrial fibrillation ([address] 06/22/2019    History of renal cell carcinoma 06/22/2019    Pneumonia due to organism 05/15/2018    Dehydration with hyponatremia 05/15/2018    Anemia of chronic kidney failure 05/15/2018    Encounter for palliative care     Hypoxia 02/12/2018    Wheelchair dependent 11/15/2017    Vascular dementia without behavioral disturbance 09/05/2017    Delirium due to another medical condition 09/05/2017    Frequent falls     Suicidal ideation 11/26/2016    Subtherapeutic serum dilantin level 11/26/2016    Seizure disorder ([address] 11/26/2016    ESRD on hemodialysis ([address] 08/30/2016    Oropharyngeal dysphagia 07/20/2016    Mixed hyperlipidemia 05/16/2016    Osteoarthritis of left shoulder 07/29/2015    Left-sided weakness 07/16/2015    Cerebral vascular disease     Schizoaffective disorder ([address] 11/12/2014    Kyphosis of thoracolumbar region 02/24/2014    Hyperkalemia 12/26/2013    Dyspnea 12/24/2013    Normochromic normocytic anemia 08/26/2013    Altered mental status 06/29/2013    Lumbar spondylosis 09/13/2011    Cerebral infarction ([address] 09/13/2011    Lumbosacral ligament sprain 09/13/2011          PLAN:     WEAN PER PROTOCOL:  [] No   [] Yes  [x] N/A    ICU PROPHYLAXIS:  Stress ulcer:  [x] PPI Agent  [] [id number] [] Sucralfate  [] Other:  VTE:   [] Enoxaparin  [x]  hep drip  [x] EPC Cuffs    NUTRITION:  [] NPO [] Tube have reviewed the key elements of all parts of the encounter with the resident. I have seen and examined the patient with the resident. I agree with the assessment and plan and status of the problem list as documented. I have seen the patient during my rounds this morning, I have reviewed the neurology note and events noted since yesterday. He continue to require noninvasive ventilation almost all the time and did not tolerate taking off noninvasive ventilation for 10 to 15 minutes and desaturated, mentation is better today and he is arousable on noninvasive ventilation and goes back to sleep easily. MRI brain was recommended by neurology but his respiratory status is still not stable for him to be transferred to MRI as he is going to need noninvasive ventilation to continue. Chest x-ray today shows better aeration of right lower lobe with improved atelectasis/infiltrate bilateral effusion is present but improved  VBG shows pH of 7.25 and PCO2 61 this morning  CT of the chest reviewed from 2016 2017 and 2018 and in 2018 he has an area of consolidation in right lower lobe/atelectasis and he had bilateral pleural effusion at that time. He is on noninvasive ventilation with pressure control of 12 which is decreased from 20 and PEEP of 8 with 30% FiO2 and his tidal volumes around 600-700  We will continue with midodrine around-the-clock. He is on Levophed 6 mcg and will try to wean off Levophed. He is on amiodarone drip and will follow up with cardiology.   Echocardiogram is pending we will follow  We will try to switch him to high flow nasal cannula during the daytime as long as his mentation is okay and no indication of increased PCO2 and will do high flow intermittently with noninvasive ventilation most of the time and at night  Dialysis today and discussed with nephrology ending Dr. Talia Landaverde    Total critical care time caring for this patient with life threatening, unstable organ failure, including direct

## 2019-06-26 NOTE — PROGRESS NOTES
(36.5 °C) (Axillary)   Resp 18   Ht 5' 9\" (1.753 m)   Wt 164 lb 7.4 oz (74.6 kg)   SpO2 100%   BMI 24.29 kg/m²   CONSTITUTIONAL:awake, alert, cooperative, Intubated  HEENT:pupils equal, round, sclera clear, conjunctiva normal.   Supple neck. NO THYROMEGALY  LUNGS:  No increased work of breathing, auscultation:b/l rales+   CARDIOVASCULAR:  Normal apical impulse, irregular rate and rhythm, murmur+  JVP:not distenbded   Carotids:NL  ABDOMEN:  No scars, normal bowel sounds, soft, non-distended, non-tender, no masses palpated, no hepatosplenomegally  MUSCULOSKELETAL:no redness, warmth, or swelling of the joints  NEUROLOGIC:  Awake, alert. SKIN:  no bruising or bleeding, normal skin color, texture, turgor  LE:no pedal edema    ECG: A fib       IMPRESSION AND RECOMMENDATIONS:       1. New onset atrial fibrillation with chads VASC score of 6. Change Amio to PO. Can be on 200 daily. Change heparin back to eliquis pending critical care is okay  2. Hypertension. 3. iabetes, stroke, vascular disease, age. 4. Need of AC after weighing benefits and risks and discussing goals of care with family. 5. Troponin elevation with flat trend most likely secondary to end-stage renal disease. No ST changes on EKG  6. Follow echo  1425 Ivone Lowe, MD Ramos         6/26/2019, 8:11 AM      Attending Cardiologist Addendum: I have reviewed and performed the history, physical, subjective, objective, assessment, and plan with the resident/fellow and agree with the note. I performed the history and physical personally. I have made changes to the note above as needed. Thank you for allowing me to participate in the care of this patient, please do not hesitate to call if you have any questions. Roman Castellon, [id number] [facility]  [facility]  [phone]

## 2019-06-26 NOTE — PROGRESS NOTES
encephalopathy and transient left-sided weakness. Possible right cortical TIA  Patient with new onset A. Fib  Other comorbid conditions include bilateral pleural effusion, CHF, subjective disorder, history of renal cell carcinoma, ESRD on HD    MRI brain pending. Due to patient's shortness of breath, would obtain a limited DWI scan to rule out ischemic event, when patient is stable for the procedure. Continue with Keppra 500 mg a day and additional 500 mg with each dialysis for seizures  2D echo is pending. Continue anticoagulation, currently on Heparin. Will follow. This note is created with the assistance of a speech-recognition program. While intending to generate a document that actually reflects the content of the visit, the document can still have some errors including those of syntax and sound a- like substitutions which may escape proofreading. In such instances, actual meaning can be extrapolated by contextual derivation.

## 2019-06-26 NOTE — PROGRESS NOTES
Physical Therapy  DATE: 2019    NAME: Jena Clarke  MRN: [id number]   : 1950    Patient not seen this date for Physical Therapy due to:  [] Blood transfusion in progress  [x] Hemodialysis  []  Patient Declined  [] Spine Precautions   [] Strict Bedrest  [] Surgery/ Procedure  [] Testing      [x] Other (RN requests pt continue to rest at this time. States pt will have HD later this date. Will check back )       [] PT being discontinued at this time. Patient independent. No further needs. [] PT being discontinued at this time as the patient has been transferred to palliative care. No further needs.     Kristina Mercado, PTA

## 2019-06-26 NOTE — PLAN OF CARE
Problem: Falls - Risk of:  Goal: Will remain free from falls  Description  Will remain free from falls  6/26/2019 1311 by Danay Austin RN  Outcome: Ongoing  6/25/2019 2319 by Una Gandhi RN  Outcome: Ongoing  Goal: Absence of physical injury  Description  Absence of physical injury  6/26/2019 1311 by Danay Austin RN  Outcome: Ongoing  6/25/2019 2319 by Una Gandhi RN  Outcome: Ongoing     Problem: Risk for Impaired Skin Integrity  Goal: Tissue integrity - skin and mucous membranes  Description  Structural intactness and normal physiological function of skin and  mucous membranes.   6/26/2019 1311 by Danay Austin RN  Outcome: Ongoing  6/25/2019 2319 by Una Gandhi RN  Outcome: Ongoing     Problem: Suicide risk  Goal: Provide patient with safe environment  Description  Provide patient with safe environment  6/26/2019 1311 by Danay Austin RN  Outcome: Ongoing  6/25/2019 2319 by Una Gandhi RN  Outcome: Ongoing     Problem: Confusion - Acute:  Goal: Absence of continued neurological deterioration signs and symptoms  Description  Absence of continued neurological deterioration signs and symptoms  6/26/2019 1311 by Danay Austin RN  Outcome: Ongoing  6/25/2019 2319 by Una Gandhi RN  Outcome: Ongoing  Goal: Mental status will be restored to baseline  Description  Mental status will be restored to baseline  6/26/2019 1311 by Danay Austin RN  Outcome: Ongoing  6/25/2019 2319 by Una Gandhi RN  Outcome: Ongoing     Problem: Discharge Planning:  Goal: Ability to perform activities of daily living will improve  Description  Ability to perform activities of daily living will improve  6/26/2019 1311 by Danay Austin RN  Outcome: Ongoing  6/25/2019 2319 by Una Gandhi RN  Outcome: Ongoing  Goal: Participates in care planning  Description  Participates in care planning  6/26/2019 1311 by Danay Austin RN  Outcome: Ongoing  6/25/2019 2319 by Una Gandhi Ongoing  Goal: Decrease in sensory misperception frequency  Description  Decrease in sensory misperception frequency  6/26/2019 1311 by Cyrus Hopson RN  Outcome: Ongoing  6/25/2019 2319 by Nayana Ashford RN  Outcome: Ongoing  Goal: Able to refrain from responding to false sensory perceptions  Description  Able to refrain from responding to false sensory perceptions  6/26/2019 1311 by Cyrus Hopson RN  Outcome: Ongoing  6/25/2019 2319 by Nayana Ashford RN  Outcome: Ongoing  Goal: Demonstrates accurate environmental perceptions  Description  Demonstrates accurate environmental perceptions  6/26/2019 1311 by Cyrus Hopson RN  Outcome: Ongoing  6/25/2019 2319 by Nayana Ashford RN  Outcome: Ongoing  Goal: Able to distinguish between reality-based and nonreality-based thinking  Description  Able to distinguish between reality-based and nonreality-based thinking  6/26/2019 1311 by Cyrus Hopson RN  Outcome: Ongoing  6/25/2019 2319 by Nayana Ashford RN  Outcome: Ongoing  Goal: Able to interrupt nonreality-based thinking  Description  Able to interrupt nonreality-based thinking  6/26/2019 1311 by Cyrus Hopson RN  Outcome: Ongoing  6/25/2019 2319 by Nayana Ashford RN  Outcome: Ongoing     Problem: Sleep Pattern Disturbance:  Goal: Appears well-rested  Description  Appears well-rested  6/26/2019 1311 by Cyrus Hopson RN  Outcome: Ongoing  6/25/2019 2319 by Nayana Ashford RN  Outcome: Ongoing     Problem: Nutrition  Goal: Optimal nutrition therapy  6/26/2019 1311 by Cyrus Hopson RN  Outcome: Ongoing  6/25/2019 2319 by Naayna Ashford RN  Outcome: Ongoing     Problem: Gas Exchange - Impaired:  Goal: Levels of oxygenation will improve  Description  Levels of oxygenation will improve  6/26/2019 1311 by Cyrus Hopson RN  Outcome: Ongoing  6/25/2019 2319 by Nayana Ashford RN  Outcome: Ongoing     Problem: Airway Clearance - Ineffective:  Goal: Ability to maintain a clear airway will RN  Outcome: Ongoing     Problem: Tissue Perfusion, Altered:  Goal: Circulatory function within specified parameters  Description  Circulatory function within specified parameters  6/26/2019 1311 by Rosario Liu RN  Outcome: Ongoing  6/25/2019 2319 by J Luis Mercer RN  Outcome: Ongoing     Problem: Tissue Perfusion - Cardiopulmonary, Altered:  Goal: Absence of angina  Description  Absence of angina  6/26/2019 1311 by Rosario Liu RN  Outcome: Ongoing  6/25/2019 2319 by J Luis Mercer RN  Outcome: Ongoing  Goal: Hemodynamic stability will improve  Description  Hemodynamic stability will improve  6/26/2019 1311 by Rosario Liu RN  Outcome: Ongoing  6/25/2019 2319 by J Luis Mercer RN  Outcome: Ongoing     Problem: Restraint Use - Nonviolent/Non-Self-Destructive Behavior:  Goal: Absence of restraint indications  Description  Absence of restraint indications  Outcome: Ongoing  Goal: Absence of restraint-related injury  Description  Absence of restraint-related injury  Outcome: Ongoing

## 2019-06-27 LAB
ABSOLUTE EOS #: 0.04 K/UL (ref 0–0.44)
ABSOLUTE IMMATURE GRANULOCYTE: 0 K/UL (ref 0–0.3)
ABSOLUTE LYMPH #: 0.25 K/UL (ref 1.1–3.7)
ABSOLUTE MONO #: 0.57 K/UL (ref 0.1–1.2)
ANION GAP SERPL CALCULATED.3IONS-SCNC: 10 MMOL/L (ref 9–17)
BASOPHILS # BLD: 0 % (ref 0–2)
BASOPHILS ABSOLUTE: 0 K/UL (ref 0–0.2)
BUN BLDV-MCNC: 20 MG/DL (ref 8–23)
BUN/CREAT BLD: ABNORMAL (ref 9–20)
CALCIUM SERPL-MCNC: 9.2 MG/DL (ref 8.6–10.4)
CHLORIDE BLD-SCNC: 93 MMOL/L (ref 98–107)
CO2: 27 MMOL/L (ref 20–31)
CREAT SERPL-MCNC: 2.28 MG/DL (ref 0.7–1.2)
CULTURE: NORMAL
DIFFERENTIAL TYPE: ABNORMAL
EOSINOPHILS RELATIVE PERCENT: 1 % (ref 1–4)
GFR AFRICAN AMERICAN: 35 ML/MIN
GFR NON-AFRICAN AMERICAN: 29 ML/MIN
GFR SERPL CREATININE-BSD FRML MDRD: ABNORMAL ML/MIN/{1.73_M2}
GFR SERPL CREATININE-BSD FRML MDRD: ABNORMAL ML/MIN/{1.73_M2}
GLUCOSE BLD-MCNC: 111 MG/DL (ref 75–110)
GLUCOSE BLD-MCNC: 127 MG/DL (ref 70–99)
GLUCOSE BLD-MCNC: 132 MG/DL (ref 75–110)
GLUCOSE BLD-MCNC: 146 MG/DL (ref 75–110)
GLUCOSE BLD-MCNC: 158 MG/DL (ref 75–110)
HCT VFR BLD CALC: 32 % (ref 40.7–50.3)
HEMOGLOBIN: 9.9 G/DL (ref 13–17)
IMMATURE GRANULOCYTES: 0 %
LYMPHOCYTES # BLD: 6 % (ref 24–43)
Lab: NORMAL
MAGNESIUM: 1.9 MG/DL (ref 1.6–2.6)
MCH RBC QN AUTO: 33.2 PG (ref 25.2–33.5)
MCHC RBC AUTO-ENTMCNC: 30.9 G/DL (ref 28.4–34.8)
MCV RBC AUTO: 107.4 FL (ref 82.6–102.9)
MONOCYTES # BLD: 14 % (ref 3–12)
MORPHOLOGY: ABNORMAL
MORPHOLOGY: ABNORMAL
NRBC AUTOMATED: 0 PER 100 WBC
PARTIAL THROMBOPLASTIN TIME: 46.3 SEC (ref 20.5–30.5)
PDW BLD-RTO: 16.1 % (ref 11.8–14.4)
PLATELET # BLD: ABNORMAL K/UL (ref 138–453)
PLATELET ESTIMATE: ABNORMAL
PLATELET, FLUORESCENCE: 68 K/UL (ref 138–453)
PLATELET, IMMATURE FRACTION: 5.3 % (ref 1.1–10.3)
PMV BLD AUTO: ABNORMAL FL (ref 8.1–13.5)
POTASSIUM SERPL-SCNC: 3.3 MMOL/L (ref 3.7–5.3)
RBC # BLD: 2.98 M/UL (ref 4.21–5.77)
RBC # BLD: ABNORMAL 10*6/UL
SEG NEUTROPHILS: 79 % (ref 36–65)
SEGMENTED NEUTROPHILS ABSOLUTE COUNT: 3.24 K/UL (ref 1.5–8.1)
SODIUM BLD-SCNC: 130 MMOL/L (ref 135–144)
SPECIMEN DESCRIPTION: NORMAL
WBC # BLD: 4.1 K/UL (ref 3.5–11.3)
WBC # BLD: ABNORMAL 10*3/UL

## 2019-06-27 PROCEDURE — 6370000000 HC RX 637 (ALT 250 FOR IP): Performed by: STUDENT IN AN ORGANIZED HEALTH CARE EDUCATION/TRAINING PROGRAM

## 2019-06-27 PROCEDURE — 2700000000 HC OXYGEN THERAPY PER DAY

## 2019-06-27 PROCEDURE — 6360000002 HC RX W HCPCS: Performed by: STUDENT IN AN ORGANIZED HEALTH CARE EDUCATION/TRAINING PROGRAM

## 2019-06-27 PROCEDURE — 2000000000 HC ICU R&B

## 2019-06-27 PROCEDURE — 6370000000 HC RX 637 (ALT 250 FOR IP): Performed by: PSYCHIATRY & NEUROLOGY

## 2019-06-27 PROCEDURE — 2580000003 HC RX 258: Performed by: STUDENT IN AN ORGANIZED HEALTH CARE EDUCATION/TRAINING PROGRAM

## 2019-06-27 PROCEDURE — 94640 AIRWAY INHALATION TREATMENT: CPT

## 2019-06-27 PROCEDURE — 6370000000 HC RX 637 (ALT 250 FOR IP): Performed by: INTERNAL MEDICINE

## 2019-06-27 PROCEDURE — 82947 ASSAY GLUCOSE BLOOD QUANT: CPT

## 2019-06-27 PROCEDURE — 85025 COMPLETE CBC W/AUTO DIFF WBC: CPT

## 2019-06-27 PROCEDURE — 94660 CPAP INITIATION&MGMT: CPT

## 2019-06-27 PROCEDURE — 99291 CRITICAL CARE FIRST HOUR: CPT | Performed by: INTERNAL MEDICINE

## 2019-06-27 PROCEDURE — 94761 N-INVAS EAR/PLS OXIMETRY MLT: CPT

## 2019-06-27 PROCEDURE — 99233 SBSQ HOSP IP/OBS HIGH 50: CPT | Performed by: INTERNAL MEDICINE

## 2019-06-27 PROCEDURE — 85730 THROMBOPLASTIN TIME PARTIAL: CPT

## 2019-06-27 PROCEDURE — 80048 BASIC METABOLIC PNL TOTAL CA: CPT

## 2019-06-27 PROCEDURE — 2580000003 HC RX 258: Performed by: INTERNAL MEDICINE

## 2019-06-27 PROCEDURE — 85055 RETICULATED PLATELET ASSAY: CPT

## 2019-06-27 PROCEDURE — 83735 ASSAY OF MAGNESIUM: CPT

## 2019-06-27 RX ORDER — LACTOBACILLUS RHAMNOSUS GG 10B CELL
1 CAPSULE ORAL
Status: DISCONTINUED | OUTPATIENT
Start: 2019-06-27 | End: 2019-07-04

## 2019-06-27 RX ADMIN — AMIODARONE HYDROCHLORIDE 200 MG: 200 TABLET ORAL at 09:56

## 2019-06-27 RX ADMIN — IPRATROPIUM BROMIDE AND ALBUTEROL SULFATE 1 AMPULE: .5; 3 SOLUTION RESPIRATORY (INHALATION) at 11:26

## 2019-06-27 RX ADMIN — Medication 10 ML: at 20:43

## 2019-06-27 RX ADMIN — MIRTAZAPINE 45 MG: 30 TABLET, FILM COATED ORAL at 20:17

## 2019-06-27 RX ADMIN — LEVETIRACETAM 500 MG: 500 TABLET, FILM COATED ORAL at 09:56

## 2019-06-27 RX ADMIN — APIXABAN 2.5 MG: 2.5 TABLET, FILM COATED ORAL at 11:00

## 2019-06-27 RX ADMIN — SIMVASTATIN 10 MG: 10 TABLET, FILM COATED ORAL at 20:18

## 2019-06-27 RX ADMIN — PANTOPRAZOLE SODIUM 40 MG: 40 TABLET, DELAYED RELEASE ORAL at 07:03

## 2019-06-27 RX ADMIN — SEVELAMER CARBONATE 800 MG: 800 TABLET, FILM COATED ORAL at 11:34

## 2019-06-27 RX ADMIN — ZIPRASIDONE HYDROCHLORIDE 40 MG: 40 CAPSULE ORAL at 09:56

## 2019-06-27 RX ADMIN — APIXABAN 2.5 MG: 2.5 TABLET, FILM COATED ORAL at 20:18

## 2019-06-27 RX ADMIN — Medication 10 ML: at 20:30

## 2019-06-27 RX ADMIN — Medication 10 ML: at 08:00

## 2019-06-27 RX ADMIN — ZIPRASIDONE HYDROCHLORIDE 40 MG: 40 CAPSULE ORAL at 17:45

## 2019-06-27 RX ADMIN — HEPARIN SODIUM 2000 UNITS: 1000 INJECTION, SOLUTION INTRAVENOUS; SUBCUTANEOUS at 07:22

## 2019-06-27 RX ADMIN — FOLIC ACID 1 MG: 1 TABLET ORAL at 09:57

## 2019-06-27 RX ADMIN — SEVELAMER CARBONATE 800 MG: 800 TABLET, FILM COATED ORAL at 09:56

## 2019-06-27 RX ADMIN — IPRATROPIUM BROMIDE AND ALBUTEROL SULFATE 1 AMPULE: .5; 3 SOLUTION RESPIRATORY (INHALATION) at 07:17

## 2019-06-27 RX ADMIN — SEVELAMER CARBONATE 800 MG: 800 TABLET, FILM COATED ORAL at 17:45

## 2019-06-27 RX ADMIN — AMIODARONE HYDROCHLORIDE 200 MG: 200 TABLET ORAL at 20:17

## 2019-06-27 RX ADMIN — Medication 1 CAPSULE: at 21:38

## 2019-06-27 RX ADMIN — ESCITALOPRAM OXALATE 10 MG: 10 TABLET ORAL at 09:57

## 2019-06-27 RX ADMIN — IPRATROPIUM BROMIDE AND ALBUTEROL SULFATE 1 AMPULE: .5; 3 SOLUTION RESPIRATORY (INHALATION) at 15:18

## 2019-06-27 RX ADMIN — INSULIN LISPRO 1 UNITS: 100 INJECTION, SOLUTION INTRAVENOUS; SUBCUTANEOUS at 20:28

## 2019-06-27 RX ADMIN — IPRATROPIUM BROMIDE AND ALBUTEROL SULFATE 1 AMPULE: .5; 3 SOLUTION RESPIRATORY (INHALATION) at 19:27

## 2019-06-27 RX ADMIN — CEFEPIME HYDROCHLORIDE 1 G: 1 INJECTION, POWDER, FOR SOLUTION INTRAMUSCULAR; INTRAVENOUS at 11:34

## 2019-06-27 ASSESSMENT — PULMONARY FUNCTION TESTS
PIF_VALUE: 23
PIF_VALUE: 22
PIF_VALUE: 22
PIF_VALUE: 20
PIF_VALUE: 21
PIF_VALUE: 17
PIF_VALUE: 21
PIF_VALUE: 2
PIF_VALUE: 18
PIF_VALUE: 20
PIF_VALUE: 20
PIF_VALUE: 21
PIF_VALUE: 23
PIF_VALUE: 22
PIF_VALUE: 22

## 2019-06-27 ASSESSMENT — PAIN SCALES - GENERAL
PAINLEVEL_OUTOF10: 0

## 2019-06-27 NOTE — PROGRESS NOTES
3. 14 3.33 3.54 3.75 46 - 49 2.70 2.92 3.14 3.37 3.61 3.85 4.10 4.36   50 - 53 2.31 2.48 2.66 2.85 3.04 3.24 3.45 3.66 50 - 53 2.58 2.80 3.02 3.25 3.49 3.73 3.98 4.24   54 - 57 2.21 2.38 2.57 2.75 2.95 3.14 3.35 3.56 54 - 57 2.46 2.67 2.89 3.12 3.36 3.60 3.85 4.11   58 - 61 2.10 2.28 2.46 2.65 2.84 3.04 3.24 3.45 58 - 61 2.32 2.54 2.76 2.99 3.23 3.47 3.72 3.98   62 - 65 1.99 2.17 2.35 2.54 2.73 2.93 3.13 3.34 62 - 65 2.19 2.40 2.62 2.85 3.09 3.33 3.58 3.84   66 - 69 1.88 2.05 2.23 2.42 2.61 2.81 3.02 3.23 66 - 69 2.04 2.26 2.48 2.71 2.95 3.19 3.44 3.70   70+ 1.82 1.99 2.17 2.36 2.55 2.75 2.95 3.16 70+ 1.97 2.19 2.41 2.64 2.87 3.12 3.37 3.62             Predicted Peak Expiratory Flow Rate                                       Height (in)  Female       Height (in) Male           Age 64 63 56 61 58 73 78 74 Age            19 344 357 372 387 402 417 432 446  60 62 64 66 68 70 72 74 76   25 337 352 366 381 396 411 426 441 25 447 476 [phone] [phone]   30 329 344 359 374 389 404 419 434 30 437 466 494 523 552 580 609 635 667   35 322 337 351 366 381 396 411 426 35 426 455 484 512 541 [phone] 657   40 314 329 344 359 374 389 404 419 40 416 445 473 502 531 559 588 617 647   45 307 322 336 351 366 381 396 411 45 405 434 463 491 520 549 577 606 636   50 299 314 329 344 359 374 389 404 50 395 424 452 481 510 538 567 596 625   55 292 307 321 336 351 366 381 396 55 384 413 442 470 499 528 556 585 615   60 284 299 314 329 344 359 374 389 60 374 403 431 460 489 517 546 575 605   65 277 292 306 321 336 351 366 381 65 363 392 421 449 478 507 535 564 594   70 269 284 299 314 329 344 359 374 70 353 382 410 439 468 496 525 554 583   75 261 274 289 305 319 334 348 364 75 344 372 400 429 458 487 515 544 573   80 253 266 282 296 312 327 342 356 80 335 362 390 419 448 476 [phone]

## 2019-06-27 NOTE — PROGRESS NOTES
and agree with the documented findings and plan of care. Any areas of disagreement are noted on the chart. I was personally present for the key portions of any procedures. I have documented in the chart those procedures where I was not present during the key portions. I have personally evaluated this patient and have completed at least one if not all key elements of the E/M (history, physical exam, and MDM). Additional findings are as noted.     Ethan Arguello MD

## 2019-06-27 NOTE — PROGRESS NOTES
insulin lispro  0-12 Units Subcutaneous TID     insulin lispro  0-6 Units Subcutaneous Nightly     Continuous Infusions:    norepinephrine 5.333 mcg/min (06/26/19 2227)    heparin (porcine) 12 Units/kg/hr (06/27/19 0722)    dextrose       PRN Meds:  heparin (porcine), heparin (porcine), sodium chloride flush, albuterol, midodrine, sodium chloride flush, magnesium hydroxide, ondansetron, senna, acetaminophen, albuterol, glucose, dextrose, glucagon (rDNA), dextrose  Home Meds:                Medications Prior to Admission: acetaminophen (SM PAIN RELIEVER) 325 MG tablet, Take 2 tablets by mouth every 6 hours as needed for Pain  ziprasidone (GEODON) 40 MG capsule, Take 1 capsule by mouth 2 times daily (with meals)  folic acid (FOLVITE) 1 MG tablet, Take 1 tablet by mouth daily  levETIRAcetam (KEPPRA) 500 MG tablet, Take 1 tablet by mouth 2 times daily  Lancets MISC, TEST BLOOD SUGAR TWICE DAILY  simvastatin (ZOCOR) 10 MG tablet, Take 1 tablet by mouth nightly  metoprolol succinate (TOPROL XL) 50 MG extended release tablet, Take 1 tablet by mouth nightly  escitalopram (LEXAPRO) 10 MG tablet, Take 1 tablet by mouth daily  lisinopril (PRINIVIL;ZESTRIL) 10 MG tablet, Take 1 tablet by mouth daily  clopidogrel (PLAVIX) 75 MG tablet, Take 1 tablet by mouth daily  amLODIPine (NORVASC) 5 MG tablet, Take 1 tablet by mouth daily  omeprazole (PRILOSEC) 20 MG delayed release capsule, Take 1 capsule by mouth Daily  sevelamer (RENVELA) 800 MG tablet,   mirtazapine (REMERON) 45 MG tablet, Take 1 tablet by mouth nightly  blood glucose test strips (TRUE METRIX BLOOD GLUCOSE TEST) strip, 1 each by In Vitro route 3 times daily As needed.   ondansetron (ZOFRAN) 4 MG tablet, Take 1 tablet by mouth daily as needed for Vomiting  albuterol (PROVENTIL) (2.5 MG/3ML) 0.083% nebulizer solution, Take 3 mLs by nebulization every 2 hours as needed for Wheezing  ipratropium-albuterol (DUONEB) 0.5-2.5 (3) MG/3ML SOLN nebulizer solution, Inhale 3 mLs into the lungs three times daily  magnesium hydroxide (MILK OF MAGNESIA) 400 MG/5ML suspension, Take 30 mLs by mouth daily as needed for Constipation  midodrine (PROAMATINE) 10 MG tablet, Take 1 tablet by mouth as needed (for systolic blood pressure less than 100)  nicotine (NICODERM CQ) 21 MG/24HR, Place 1 patch onto the skin daily as needed (if patient smokes/requests nicotine replacent therapy)    INVESTIGATIONS     Last 3 CMP:    Recent Labs     06/25/19 0518 06/26/19 0443 06/27/19 0447   * 129* 130*   K 4.1 3.2* 3.3*   CL 94* 92* 93*   CO2 26 26 27   BUN 19 31* 20   CREATININE 2.58* 3.45* 2.28*   CALCIUM 8.8 9.5 9.2       Last 3 CBC:  Recent Labs     06/25/19 0518 06/26/19 0443 06/27/19 0447   WBC 5.2 4.7 4.1   RBC 3.23* 3.01* 2.98*   HGB 10.5* 10.0* 9.9*   HCT 34.6* 32.0* 32.0*   .1* 106.3* 107.4*   MCH 32.5 33.2 33.2   MCHC 30.3 31.3 30.9   RDW 16.0* 16.1* 16.1*   PLT 79* 71* See Reflexed IPF Result   MPV 12.4 11.5 NOT REPORTED       ASSESSMENT     1. ESRD on intermittent maintenance with dialysis Monday Wednesday Friday using AV fistula  2. Suspected HCAP  3. Atrial fibrillation with rapid ventricular rate currently on amiodarone  4. Hypotension on pressors  5. Hypercapnic respiratory failure - NIV  6.  AMS  7. DM2  8. Renal cell carcinoma status post embolization    PLAN     1. Hemodialysis tomorrow. Will challenge dry weight based upon hemodynamics. 2.  Continue midodrine to 10 mg 3 times a day  3.   Prognosis guarded    Please do not hesitate to call with questions    This note is created with the assistance of a speech-recognition program. While intending to generate a document that actually reflects the content of the visit, no guarantees can be provided that every mistake has been identified and corrected by editing    Jony Foreman MD, MRCDESIRE Lemus, FACP   6/27/2019 7:59 AM  [facility]

## 2019-06-27 NOTE — PROGRESS NOTES
Physical Therapy  DATE: 2019    NAME: Susan Aragon  MRN: [id number]   : 1950    Patient not seen this date for Physical Therapy due to:  [] Blood transfusion in progress  [] Hemodialysis  []  Patient Declined  [] Spine Precautions   [] Strict Bedrest  [] Surgery/ Procedure  [] Testing      [x] Other: Per RN not appropriate for PT at this time. [] PT being discontinued at this time. Patient independent. No further needs. [] PT being discontinued at this time as the patient has been transferred to palliative care. No further needs.     Kyle Tadeo, PT

## 2019-06-27 NOTE — PLAN OF CARE
Problem: Falls - Risk of:  Goal: Will remain free from falls  Description  Will remain free from falls  Outcome: Ongoing  Goal: Absence of physical injury  Description  Absence of physical injury  Outcome: Ongoing     Problem: Risk for Impaired Skin Integrity  Goal: Tissue integrity - skin and mucous membranes  Description  Structural intactness and normal physiological function of skin and  mucous membranes.   Outcome: Ongoing     Problem: Suicide risk  Goal: Provide patient with safe environment  Description  Provide patient with safe environment  Outcome: Ongoing     Problem: Confusion - Acute:  Goal: Absence of continued neurological deterioration signs and symptoms  Description  Absence of continued neurological deterioration signs and symptoms  Outcome: Ongoing  Goal: Mental status will be restored to baseline  Description  Mental status will be restored to baseline  Outcome: Ongoing     Problem: Discharge Planning:  Goal: Ability to perform activities of daily living will improve  Description  Ability to perform activities of daily living will improve  Outcome: Ongoing  Goal: Participates in care planning  Description  Participates in care planning  Outcome: Ongoing  Goal: Discharged to appropriate level of care  Description  Discharged to appropriate level of care  Outcome: Ongoing     Problem: Injury - Risk of, Physical Injury:  Goal: Will remain free from falls  Description  Will remain free from falls  Outcome: Ongoing  Goal: Absence of physical injury  Description  Absence of physical injury  Outcome: Ongoing     Problem: Mood - Altered:  Goal: Mood stable  Description  Mood stable  Outcome: Ongoing  Goal: Absence of abusive behavior  Description  Absence of abusive behavior  Outcome: Ongoing  Goal: Verbalizations of feeling emotionally comfortable while being cared for will increase  Description  Verbalizations of feeling emotionally comfortable while being cared for will increase  Outcome: Ongoing

## 2019-06-27 NOTE — PROGRESS NOTES
due to another medical condition 09/05/2017    Frequent falls     Suicidal ideation 11/26/2016    Subtherapeutic serum dilantin level 11/26/2016    Seizure disorder ([facility] 11/26/2016    ESRD on hemodialysis ([facility] 08/30/2016    Oropharyngeal dysphagia 07/20/2016    Mixed hyperlipidemia 05/16/2016    Osteoarthritis of left shoulder 07/29/2015    Left-sided weakness 07/16/2015    Cerebral vascular disease     Schizoaffective disorder ([facility] 11/12/2014    Kyphosis of thoracolumbar region 02/24/2014    Hyperkalemia 12/26/2013    Dyspnea 12/24/2013    Normochromic normocytic anemia 08/26/2013    Altered mental status 06/29/2013    Lumbar spondylosis 09/13/2011    Cerebral infarction ([facility] 09/13/2011    Lumbosacral ligament sprain 09/13/2011          PLAN:     WEAN PER PROTOCOL:  [] No   [x] Yes  [] N/A    DISCONTINUE ANY LABS:   [x] No   [] Yes    ICU PROPHYLAXIS:  Stress ulcer:  [x] PPI Agent  [] [id number] [] Sucralfate  [] Other:  VTE:   [] Enoxaparin  [] Unfract.  Heparin Subcut  [x] EPC Cuffs    NUTRITION:  [] NPO [] Tube Feeding (Specify: ) [] TPN  [x] PO (Diet: DIET RENAL;  Dietary Nutrition Supplements: Renal Oral Supplement)    HOME MEDICATIONS RECONCILED: [x] No  [] Yes    INSULIN DRIP:   [x] No   [] Yes    CONSULTATION NEEDED:  [] No   [x] Yes    FAMILY UPDATED:    [x] No   [] Yes    TRANSFER OUT OF ICU:   [x] No   [] Yes    ADDITIONAL PLAN:    Neuro   - Metabolic encephalopathy   - Neuro checks per unit   - Pain control PRN   - Geodon 40 mg BID held 2/2 somnolence    - Keppra 500 mg qd for PMHx seizures    - Lexapro 10 mg qd   - Ativan prn for agitation    - Remeron 45 mg nightly   - MRI brain pending due to patient's instability    - CT head 6/24: no acute intracranial abnormality    - DNR CCA with no intubation     Cardiology   - PMHx CHTN    - New onset A Fib    - Amiodarone 200 mg BID PO    - Levo to control blood pressures   - Wean pressors as tolerated    - Midodrine 10 mg TID for

## 2019-06-28 LAB
ABSOLUTE EOS #: 0.07 K/UL (ref 0–0.44)
ABSOLUTE IMMATURE GRANULOCYTE: 0 K/UL (ref 0–0.3)
ABSOLUTE LYMPH #: 0.33 K/UL (ref 1.1–3.7)
ABSOLUTE MONO #: 0.63 K/UL (ref 0.1–1.2)
ANION GAP SERPL CALCULATED.3IONS-SCNC: 9 MMOL/L (ref 9–17)
BASOPHILS # BLD: 0 % (ref 0–2)
BASOPHILS ABSOLUTE: 0 K/UL (ref 0–0.2)
BUN BLDV-MCNC: 31 MG/DL (ref 8–23)
BUN/CREAT BLD: ABNORMAL (ref 9–20)
CALCIUM SERPL-MCNC: 9.5 MG/DL (ref 8.6–10.4)
CHLORIDE BLD-SCNC: 92 MMOL/L (ref 98–107)
CO2: 26 MMOL/L (ref 20–31)
CREAT SERPL-MCNC: 2.98 MG/DL (ref 0.7–1.2)
CULTURE: NORMAL
DIFFERENTIAL TYPE: ABNORMAL
EOSINOPHILS RELATIVE PERCENT: 2 % (ref 1–4)
GFR AFRICAN AMERICAN: 25 ML/MIN
GFR NON-AFRICAN AMERICAN: 21 ML/MIN
GFR SERPL CREATININE-BSD FRML MDRD: ABNORMAL ML/MIN/{1.73_M2}
GFR SERPL CREATININE-BSD FRML MDRD: ABNORMAL ML/MIN/{1.73_M2}
GLUCOSE BLD-MCNC: 101 MG/DL (ref 75–110)
GLUCOSE BLD-MCNC: 118 MG/DL (ref 70–99)
HCT VFR BLD CALC: 32.5 % (ref 40.7–50.3)
HEMOGLOBIN: 10.1 G/DL (ref 13–17)
IMMATURE GRANULOCYTES: 0 %
LYMPHOCYTES # BLD: 9 % (ref 24–43)
Lab: NORMAL
MAGNESIUM: 1.9 MG/DL (ref 1.6–2.6)
MCH RBC QN AUTO: 33.1 PG (ref 25.2–33.5)
MCHC RBC AUTO-ENTMCNC: 31.1 G/DL (ref 28.4–34.8)
MCV RBC AUTO: 106.6 FL (ref 82.6–102.9)
MONOCYTES # BLD: 17 % (ref 3–12)
MORPHOLOGY: ABNORMAL
MORPHOLOGY: ABNORMAL
NRBC AUTOMATED: 0 PER 100 WBC
PDW BLD-RTO: 15.8 % (ref 11.8–14.4)
PLATELET # BLD: ABNORMAL K/UL (ref 138–453)
PLATELET ESTIMATE: ABNORMAL
PLATELET, FLUORESCENCE: 70 K/UL (ref 138–453)
PLATELET, IMMATURE FRACTION: 4.5 % (ref 1.1–10.3)
PMV BLD AUTO: ABNORMAL FL (ref 8.1–13.5)
POTASSIUM SERPL-SCNC: 3.2 MMOL/L (ref 3.7–5.3)
RBC # BLD: 3.05 M/UL (ref 4.21–5.77)
RBC # BLD: ABNORMAL 10*6/UL
SEG NEUTROPHILS: 72 % (ref 36–65)
SEGMENTED NEUTROPHILS ABSOLUTE COUNT: 2.67 K/UL (ref 1.5–8.1)
SODIUM BLD-SCNC: 127 MMOL/L (ref 135–144)
SPECIMEN DESCRIPTION: NORMAL
WBC # BLD: 3.7 K/UL (ref 3.5–11.3)
WBC # BLD: ABNORMAL 10*3/UL

## 2019-06-28 PROCEDURE — 82947 ASSAY GLUCOSE BLOOD QUANT: CPT

## 2019-06-28 PROCEDURE — 2500000003 HC RX 250 WO HCPCS: Performed by: STUDENT IN AN ORGANIZED HEALTH CARE EDUCATION/TRAINING PROGRAM

## 2019-06-28 PROCEDURE — 90935 HEMODIALYSIS ONE EVALUATION: CPT

## 2019-06-28 PROCEDURE — 6370000000 HC RX 637 (ALT 250 FOR IP): Performed by: STUDENT IN AN ORGANIZED HEALTH CARE EDUCATION/TRAINING PROGRAM

## 2019-06-28 PROCEDURE — 83735 ASSAY OF MAGNESIUM: CPT

## 2019-06-28 PROCEDURE — 99233 SBSQ HOSP IP/OBS HIGH 50: CPT | Performed by: INTERNAL MEDICINE

## 2019-06-28 PROCEDURE — 6370000000 HC RX 637 (ALT 250 FOR IP): Performed by: PSYCHIATRY & NEUROLOGY

## 2019-06-28 PROCEDURE — 2580000003 HC RX 258: Performed by: INTERNAL MEDICINE

## 2019-06-28 PROCEDURE — 99291 CRITICAL CARE FIRST HOUR: CPT | Performed by: INTERNAL MEDICINE

## 2019-06-28 PROCEDURE — 94762 N-INVAS EAR/PLS OXIMTRY CONT: CPT

## 2019-06-28 PROCEDURE — 2580000003 HC RX 258: Performed by: STUDENT IN AN ORGANIZED HEALTH CARE EDUCATION/TRAINING PROGRAM

## 2019-06-28 PROCEDURE — 80048 BASIC METABOLIC PNL TOTAL CA: CPT

## 2019-06-28 PROCEDURE — 94640 AIRWAY INHALATION TREATMENT: CPT

## 2019-06-28 PROCEDURE — 97110 THERAPEUTIC EXERCISES: CPT

## 2019-06-28 PROCEDURE — 99232 SBSQ HOSP IP/OBS MODERATE 35: CPT | Performed by: PSYCHIATRY & NEUROLOGY

## 2019-06-28 PROCEDURE — 85025 COMPLETE CBC W/AUTO DIFF WBC: CPT

## 2019-06-28 PROCEDURE — 6370000000 HC RX 637 (ALT 250 FOR IP): Performed by: INTERNAL MEDICINE

## 2019-06-28 PROCEDURE — 94660 CPAP INITIATION&MGMT: CPT

## 2019-06-28 PROCEDURE — 6360000002 HC RX W HCPCS: Performed by: STUDENT IN AN ORGANIZED HEALTH CARE EDUCATION/TRAINING PROGRAM

## 2019-06-28 PROCEDURE — 85055 RETICULATED PLATELET ASSAY: CPT

## 2019-06-28 PROCEDURE — 6360000002 HC RX W HCPCS: Performed by: INTERNAL MEDICINE

## 2019-06-28 PROCEDURE — 2000000000 HC ICU R&B

## 2019-06-28 PROCEDURE — 2700000000 HC OXYGEN THERAPY PER DAY

## 2019-06-28 RX ORDER — MIDODRINE HYDROCHLORIDE 5 MG/1
10 TABLET ORAL 2 TIMES DAILY WITH MEALS
Status: DISCONTINUED | OUTPATIENT
Start: 2019-06-28 | End: 2019-06-29

## 2019-06-28 RX ORDER — MIDODRINE HYDROCHLORIDE 5 MG/1
10 TABLET ORAL 3 TIMES DAILY
Status: DISCONTINUED | OUTPATIENT
Start: 2019-06-28 | End: 2019-06-28 | Stop reason: SDUPTHER

## 2019-06-28 RX ADMIN — DOXERCALCIFEROL 3.5 MCG: 2 INJECTION, SOLUTION INTRAVENOUS at 11:15

## 2019-06-28 RX ADMIN — APIXABAN 2.5 MG: 2.5 TABLET, FILM COATED ORAL at 20:34

## 2019-06-28 RX ADMIN — SIMVASTATIN 10 MG: 10 TABLET, FILM COATED ORAL at 20:34

## 2019-06-28 RX ADMIN — AMIODARONE HYDROCHLORIDE 200 MG: 200 TABLET ORAL at 09:00

## 2019-06-28 RX ADMIN — IPRATROPIUM BROMIDE AND ALBUTEROL SULFATE 1 AMPULE: .5; 3 SOLUTION RESPIRATORY (INHALATION) at 11:17

## 2019-06-28 RX ADMIN — PANTOPRAZOLE SODIUM 40 MG: 40 TABLET, DELAYED RELEASE ORAL at 09:00

## 2019-06-28 RX ADMIN — FOLIC ACID 1 MG: 1 TABLET ORAL at 10:51

## 2019-06-28 RX ADMIN — IPRATROPIUM BROMIDE AND ALBUTEROL SULFATE 1 AMPULE: .5; 3 SOLUTION RESPIRATORY (INHALATION) at 08:13

## 2019-06-28 RX ADMIN — IPRATROPIUM BROMIDE AND ALBUTEROL SULFATE 1 AMPULE: .5; 3 SOLUTION RESPIRATORY (INHALATION) at 15:42

## 2019-06-28 RX ADMIN — IPRATROPIUM BROMIDE AND ALBUTEROL SULFATE 1 AMPULE: .5; 3 SOLUTION RESPIRATORY (INHALATION) at 19:49

## 2019-06-28 RX ADMIN — Medication 1 CAPSULE: at 17:06

## 2019-06-28 RX ADMIN — MIRTAZAPINE 45 MG: 30 TABLET, FILM COATED ORAL at 20:34

## 2019-06-28 RX ADMIN — SEVELAMER CARBONATE 800 MG: 800 TABLET, FILM COATED ORAL at 09:00

## 2019-06-28 RX ADMIN — APIXABAN 2.5 MG: 2.5 TABLET, FILM COATED ORAL at 09:00

## 2019-06-28 RX ADMIN — CEFEPIME HYDROCHLORIDE 1 G: 1 INJECTION, POWDER, FOR SOLUTION INTRAMUSCULAR; INTRAVENOUS at 13:07

## 2019-06-28 RX ADMIN — Medication 1 CAPSULE: at 08:00

## 2019-06-28 RX ADMIN — MIDODRINE HYDROCHLORIDE 10 MG: 5 TABLET ORAL at 17:06

## 2019-06-28 RX ADMIN — SEVELAMER CARBONATE 800 MG: 800 TABLET, FILM COATED ORAL at 17:06

## 2019-06-28 RX ADMIN — Medication 7 MCG/MIN: at 05:46

## 2019-06-28 RX ADMIN — ZIPRASIDONE HYDROCHLORIDE 40 MG: 40 CAPSULE ORAL at 17:06

## 2019-06-28 RX ADMIN — Medication 10 ML: at 20:32

## 2019-06-28 RX ADMIN — ZIPRASIDONE HYDROCHLORIDE 40 MG: 40 CAPSULE ORAL at 09:00

## 2019-06-28 RX ADMIN — ESCITALOPRAM OXALATE 10 MG: 10 TABLET ORAL at 10:50

## 2019-06-28 RX ADMIN — LEVETIRACETAM 500 MG: 500 TABLET, FILM COATED ORAL at 09:00

## 2019-06-28 RX ADMIN — AMIODARONE HYDROCHLORIDE 200 MG: 200 TABLET ORAL at 20:34

## 2019-06-28 RX ADMIN — Medication 10 ML: at 20:31

## 2019-06-28 ASSESSMENT — PULMONARY FUNCTION TESTS
PIF_VALUE: 14
PIF_VALUE: 18
PIF_VALUE: 18
PIF_VALUE: 15
PIF_VALUE: 16
PIF_VALUE: 19

## 2019-06-28 ASSESSMENT — PAIN SCALES - GENERAL
PAINLEVEL_OUTOF10: 0

## 2019-06-28 NOTE — PROGRESS NOTES
Skin - normal coloration , no rashes    MEDICATIONS:    Scheduled Meds:   apixaban  2.5 mg Oral BID    lactobacillus  1 capsule Oral TID     amiodarone  200 mg Oral BID    levETIRAcetam  500 mg Oral Daily    ipratropium-albuterol  1 ampule Inhalation 4x daily    cefepime  1 g Intravenous Q24H    sodium chloride flush  10 mL Intravenous 2 times per day    escitalopram  10 mg Oral Daily    folic acid  1 mg Oral Daily    mirtazapine  45 mg Oral Nightly    pantoprazole  40 mg Oral QAM AC    sevelamer  800 mg Oral TID WC    simvastatin  10 mg Oral Nightly    ziprasidone  40 mg Oral BID WC    sodium chloride flush  10 mL Intravenous 2 times per day    insulin lispro  0-12 Units Subcutaneous TID WC    insulin lispro  0-6 Units Subcutaneous Nightly     Continuous Infusions:   norepinephrine 6 mcg/min (06/28/19 0720)    dextrose       PRN Meds:     sodium chloride flush 10 mL PRN   albuterol 2.5 mg Q2H PRN   midodrine 10 mg PRN   sodium chloride flush 10 mL PRN   magnesium hydroxide 30 mL Daily PRN   ondansetron 4 mg Q6H PRN   senna 1 tablet Daily PRN   acetaminophen 500 mg Q4H PRN   albuterol 2.5 mg As Directed RT PRN   glucose 15 g PRN   dextrose 12.5 g PRN   glucagon (rDNA) 1 mg PRN   dextrose 100 mL/hr PRN         VENT SETTINGS (Comprehensive) (if applicable):  Vent Information  Skin Assessment: Clean, dry, & intact  Vent Type: Servo i  Vent Mode: NIV/PC  Pressure Ordered: 10  Rate Set: 16 bmp  FiO2 : 30 %  PEEP/CPAP: 8  I Time/ I Time %: 0.8 s  Humidification Source: Heated wire  Humidification Temp: 31  Additional Respiratory  Assessments  Pulse: 85  Resp: 22  SpO2: 100 %  Humidification Source: Heated wire  Humidification Temp: 31  Oral Care Completed?: Yes  Oral Care: Mouth moisturizer, Mouth swabbed  Skin barrier applied: Yes     Laboratory findings:    Complete Blood Count:   Recent Labs     06/26/19  0443 06/27/19  0447 06/28/19  0539   WBC 4.7 4.1 3.7   HGB 10.0* 9.9* 10.1*   HCT 32.0* encephalopathy   - Neuro checks per unit   - Pain control PRN   - Geodon 40 mg BID held 2/2 somnolence    - Keppra 500 mg qd for PMHx seizures    - Lexapro 10 mg qd   - Ativan prn for agitation    - Remeron 45 mg nightly   - MRI brain pending due to patient's instability    - CT head 6/24: no acute intracranial abnormality    - DNR CCA with no intubation     Cardiology   - PMHx CHTN    - New onset A Fib    - Amiodarone 200 mg BID PO    - On Levophed drip, will change parameters to SBP>90   - Wean pressors as tolerated    - Midodrine 10 mg TID for hypotension    - on Eliquis   - Cardio following    - Thrombocytopenia: 70 this morning    - Echo 6/21: EF55%, dilated R ventricle with reduced function, mild MR, moderately enlarged R atrium, L atrium mod dilated, mod-severe tricuspid regurg     Respiratory   - Acute hypoxic hypercapnic respiratory failure likely multifactorial 2/2 combo of bilateral pleural effusion and right pleural effusion with possible pneumonia or possible chronic neuromuscular issues contributing to hypercapnia    - Requiring BiPap    - Duoneb ordered for PMHx asthma    - Chest XR 6/26: mild cardiomegaly and bilateral pulm congestion with small R basilar infiltrate     GI/Diet/Fluids   - Renal diet    - Protonix   - Bowel regimen in place   - IVF: none running     Renal   - ESRD, Hx RCC   - Nephro consulted and following    - Dialysis MWF    - Replace lytes as needed    - Hypokalemia noted 3.2    Infectious Disease    - ID consulted for cefepime recommendations for health care associated pneumonia    - Cefepime 1g qd    - Leukocytosis resolved    - Afebrile overnight     MSK   - SCDs in place and functioning    - PT/OT following     Endocrine   - Humalog - 1 unit last 24 hrs   - Hypoglycemia protocol in place    - Blood sugars have been controlled     Patricia Burger MD  Resident PGY-2  [address], [address]  6/28/2019, 8:11 AM    Attending Physician Statement  I have

## 2019-06-28 NOTE — PROGRESS NOTES
Soft, non tender. Bowel sounds hypoactive. No organomegaly  All four Extremities: No cyanosis, clubbing, edema, or effusions. Neurologic: Somnolent  Skin: Cool and dry with good turgor. Signs of peripheral arterial insufficiency. No ulcerations. No open wounds. Medical Decision Making -Laboratory:   I have independently reviewed/ordered the following labs:    CBC with Differential:   Recent Labs     197 19  0539   WBC 4.1 3.7   HGB 9.9* 10.1*   HCT 32.0* 32.5*   PLT See Reflexed IPF Result See Reflexed IPF Result   LYMPHOPCT 6* 9*   MONOPCT 14* 17*     BMP:   Recent Labs     19  0539   * 127*   K 3.3* 3.2*   CL 93* 92*   CO2 27 26   BUN 20 31*   CREATININE 2.28* 2.98*   MG 1.9 1.9     Hepatic Function Panel: No results for input(s): PROT, LABALBU, BILIDIR, IBILI, BILITOT, ALKPHOS, ALT, AST in the last 72 hours. No results for input(s): RPR in the last 72 hours. No results for input(s): HIV in the last 72 hours. No results for input(s): BC in the last 72 hours.   Lab Results   Component Value Date    PH 7.38 2013    RBC 3.05 2019    RBC 2.93 2012    WBC 3.7 2019     Lab Results   Component Value Date    CREATININE 2.98 2019    GLUCOSE 118 2019    GLUCOSE 77 2012       Medical Decision Making-Imagin-24 CT Brain  EXAMINATION:   CT OF THE HEAD WITHOUT CONTRAST  2019 9:21 am       TECHNIQUE:   CT of the head was performed without the administration of intravenous   contrast. Dose modulation, iterative reconstruction, and/or weight based   adjustment of the mA/kV was utilized to reduce the radiation dose to as low   as reasonably achievable.       COMPARISON:   2019       HISTORY:   ORDERING SYSTEM PROVIDED HISTORY: AMS   TECHNOLOGIST PROVIDED HISTORY:       Ordering Physician Provided Reason for Exam: John Blake is a 77 y.o.   gentleman with significant past medical history of end-stage renal disease on

## 2019-06-28 NOTE — PROGRESS NOTES
Dialysis Post Treatment Note  Patient tolerated treatment well. Denies complaints at this time. No complications noted t/o tx.    Vitals:    06/28/19 1245   BP: (!) 108/49   Pulse: 85   Resp: 22   Temp: 97 °F (36.1 °C)   SpO2: 100%     Pre-Weight = 74.5g  Post-weight = Weight: 162 lb 0.6 oz (73.5 kg)  Total Liters Processed = Total Liters Processed (l/min): 76.1 l/min  Rinseback Volume (mL) = Rinseback Volume (ml): 350 ml  Net Removal (mL) = 1100 mL  Length of treatment=3.25 hours

## 2019-06-28 NOTE — PLAN OF CARE
Cheri Rascon RN  Outcome: Ongoing  6/28/2019 0225 by Erica Trivedi RN  Outcome: Ongoing     Problem: Anxiety/Stress:  Goal: Level of anxiety will decrease  Description  Level of anxiety will decrease  6/28/2019 0830 by Mildred Chavarria RN  Outcome: Ongoing  6/28/2019 0225 by Erica Trivedi RN  Outcome: Ongoing     Problem: Aspiration:  Goal: Absence of aspiration  Description  Absence of aspiration  6/28/2019 0830 by Mildred Chavarria RN  Outcome: Ongoing  6/28/2019 0225 by Erica Trivedi RN  Outcome: Ongoing     Problem: Cardiac Output - Decreased:  Goal: Hemodynamic stability will improve  Description  Hemodynamic stability will improve  6/28/2019 0830 by Mildred Chavarria RN  Outcome: Ongoing  6/28/2019 0225 by Erica Trivedi RN  Outcome: Ongoing     Problem: Nutrition Deficit:  Goal: Ability to achieve adequate nutritional intake will improve  Description  Ability to achieve adequate nutritional intake will improve  6/28/2019 0830 by Mildred Chavarria RN  Outcome: Ongoing  6/28/2019 0225 by Erica Trivedi RN  Outcome: Ongoing     Problem: Serum Glucose Level - Abnormal:  Goal: Ability to maintain appropriate glucose levels will improve to within specified parameters  Description  Ability to maintain appropriate glucose levels will improve to within specified parameters  6/28/2019 0830 by Mildred Chavarria RN  Outcome: Ongoing  6/28/2019 0225 by Erica Trivedi RN  Outcome: Ongoing     Problem: Skin Integrity - Impaired:  Goal: Will show no infection signs and symptoms  Description  Will show no infection signs and symptoms  6/28/2019 0830 by Mildred Chavarria RN  Outcome: Ongoing  6/28/2019 0225 by Erica Trivedi RN  Outcome: Ongoing  Goal: Absence of new skin breakdown  Description  Absence of new skin breakdown  6/28/2019 0830 by Mildred Chavarria RN  Outcome: Ongoing  6/28/2019 0225 by Erica Trivedi RN  Outcome: Ongoing     Problem: Tissue Perfusion, Altered:  Goal: Circulatory function within specified

## 2019-06-29 ENCOUNTER — APPOINTMENT (OUTPATIENT)
Dept: GENERAL RADIOLOGY | Age: 69
DRG: 193 | End: 2019-06-29
Payer: MEDICARE

## 2019-06-29 LAB
ABSOLUTE EOS #: 0.03 K/UL (ref 0–0.4)
ABSOLUTE IMMATURE GRANULOCYTE: 0.03 K/UL (ref 0–0.3)
ABSOLUTE LYMPH #: 0.33 K/UL (ref 1–4.8)
ABSOLUTE MONO #: 0.48 K/UL (ref 0.1–0.8)
ALLEN TEST: POSITIVE
ANION GAP SERPL CALCULATED.3IONS-SCNC: 9 MMOL/L (ref 9–17)
BASOPHILS # BLD: 0 % (ref 0–2)
BASOPHILS ABSOLUTE: 0 K/UL (ref 0–0.2)
BUN BLDV-MCNC: 22 MG/DL (ref 8–23)
BUN/CREAT BLD: ABNORMAL (ref 9–20)
CALCIUM SERPL-MCNC: 9.2 MG/DL (ref 8.6–10.4)
CHLORIDE BLD-SCNC: 94 MMOL/L (ref 98–107)
CO2: 25 MMOL/L (ref 20–31)
CREAT SERPL-MCNC: 2.19 MG/DL (ref 0.7–1.2)
DIFFERENTIAL TYPE: ABNORMAL
EOSINOPHILS RELATIVE PERCENT: 1 % (ref 1–4)
FIO2: 30
GFR AFRICAN AMERICAN: 36 ML/MIN
GFR NON-AFRICAN AMERICAN: 30 ML/MIN
GFR SERPL CREATININE-BSD FRML MDRD: ABNORMAL ML/MIN/{1.73_M2}
GFR SERPL CREATININE-BSD FRML MDRD: ABNORMAL ML/MIN/{1.73_M2}
GLUCOSE BLD-MCNC: 114 MG/DL (ref 75–110)
GLUCOSE BLD-MCNC: 118 MG/DL (ref 70–99)
GLUCOSE BLD-MCNC: 146 MG/DL (ref 75–110)
GLUCOSE BLD-MCNC: 91 MG/DL (ref 75–110)
HCT VFR BLD CALC: 30.5 % (ref 40.7–50.3)
HEMOGLOBIN: 9.4 G/DL (ref 13–17)
IMMATURE GRANULOCYTES: 1 %
LYMPHOCYTES # BLD: 11 % (ref 24–44)
MCH RBC QN AUTO: 33.1 PG (ref 25.2–33.5)
MCHC RBC AUTO-ENTMCNC: 30.8 G/DL (ref 28.4–34.8)
MCV RBC AUTO: 107.4 FL (ref 82.6–102.9)
MODE: ABNORMAL
MONOCYTES # BLD: 16 % (ref 1–7)
MORPHOLOGY: ABNORMAL
MORPHOLOGY: ABNORMAL
NEGATIVE BASE EXCESS, ART: ABNORMAL (ref 0–2)
NRBC AUTOMATED: 0 PER 100 WBC
O2 DEVICE/FLOW/%: ABNORMAL
PATIENT TEMP: ABNORMAL
PDW BLD-RTO: 15.9 % (ref 11.8–14.4)
PLATELET # BLD: ABNORMAL K/UL (ref 138–453)
PLATELET ESTIMATE: ABNORMAL
PLATELET, FLUORESCENCE: 64 K/UL (ref 138–453)
PLATELET, IMMATURE FRACTION: 4.6 % (ref 1.1–10.3)
PMV BLD AUTO: ABNORMAL FL (ref 8.1–13.5)
POC HCO3: 29.2 MMOL/L (ref 21–28)
POC O2 SATURATION: 93 % (ref 94–98)
POC PCO2 TEMP: ABNORMAL MM HG
POC PCO2: 69.6 MM HG (ref 35–48)
POC PH TEMP: ABNORMAL
POC PH: 7.23 (ref 7.35–7.45)
POC PO2 TEMP: ABNORMAL MM HG
POC PO2: 81.7 MM HG (ref 83–108)
POSITIVE BASE EXCESS, ART: 1 (ref 0–3)
POTASSIUM SERPL-SCNC: 3.6 MMOL/L (ref 3.7–5.3)
RBC # BLD: 2.84 M/UL (ref 4.21–5.77)
RBC # BLD: ABNORMAL 10*6/UL
SAMPLE SITE: ABNORMAL
SEG NEUTROPHILS: 71 % (ref 36–66)
SEGMENTED NEUTROPHILS ABSOLUTE COUNT: 2.13 K/UL (ref 1.8–7.7)
SODIUM BLD-SCNC: 128 MMOL/L (ref 135–144)
TCO2 (CALC), ART: 31 MMOL/L (ref 22–29)
WBC # BLD: 3 K/UL (ref 3.5–11.3)
WBC # BLD: ABNORMAL 10*3/UL

## 2019-06-29 PROCEDURE — 6370000000 HC RX 637 (ALT 250 FOR IP): Performed by: INTERNAL MEDICINE

## 2019-06-29 PROCEDURE — 36600 WITHDRAWAL OF ARTERIAL BLOOD: CPT

## 2019-06-29 PROCEDURE — 99233 SBSQ HOSP IP/OBS HIGH 50: CPT | Performed by: INTERNAL MEDICINE

## 2019-06-29 PROCEDURE — 2580000003 HC RX 258: Performed by: STUDENT IN AN ORGANIZED HEALTH CARE EDUCATION/TRAINING PROGRAM

## 2019-06-29 PROCEDURE — 6360000002 HC RX W HCPCS: Performed by: STUDENT IN AN ORGANIZED HEALTH CARE EDUCATION/TRAINING PROGRAM

## 2019-06-29 PROCEDURE — 6370000000 HC RX 637 (ALT 250 FOR IP): Performed by: STUDENT IN AN ORGANIZED HEALTH CARE EDUCATION/TRAINING PROGRAM

## 2019-06-29 PROCEDURE — 85025 COMPLETE CBC W/AUTO DIFF WBC: CPT

## 2019-06-29 PROCEDURE — 80048 BASIC METABOLIC PNL TOTAL CA: CPT

## 2019-06-29 PROCEDURE — 94761 N-INVAS EAR/PLS OXIMETRY MLT: CPT

## 2019-06-29 PROCEDURE — 85055 RETICULATED PLATELET ASSAY: CPT

## 2019-06-29 PROCEDURE — 82947 ASSAY GLUCOSE BLOOD QUANT: CPT

## 2019-06-29 PROCEDURE — 2000000000 HC ICU R&B

## 2019-06-29 PROCEDURE — 6370000000 HC RX 637 (ALT 250 FOR IP): Performed by: PSYCHIATRY & NEUROLOGY

## 2019-06-29 PROCEDURE — 2580000003 HC RX 258: Performed by: INTERNAL MEDICINE

## 2019-06-29 PROCEDURE — 99291 CRITICAL CARE FIRST HOUR: CPT | Performed by: INTERNAL MEDICINE

## 2019-06-29 PROCEDURE — 2700000000 HC OXYGEN THERAPY PER DAY

## 2019-06-29 PROCEDURE — 71045 X-RAY EXAM CHEST 1 VIEW: CPT

## 2019-06-29 PROCEDURE — 94660 CPAP INITIATION&MGMT: CPT

## 2019-06-29 PROCEDURE — 94640 AIRWAY INHALATION TREATMENT: CPT

## 2019-06-29 PROCEDURE — 82803 BLOOD GASES ANY COMBINATION: CPT

## 2019-06-29 RX ORDER — MIDODRINE HYDROCHLORIDE 5 MG/1
10 TABLET ORAL
Status: DISCONTINUED | OUTPATIENT
Start: 2019-06-29 | End: 2019-07-05

## 2019-06-29 RX ADMIN — APIXABAN 2.5 MG: 2.5 TABLET, FILM COATED ORAL at 19:49

## 2019-06-29 RX ADMIN — Medication 1 CAPSULE: at 18:03

## 2019-06-29 RX ADMIN — Medication 1 CAPSULE: at 12:02

## 2019-06-29 RX ADMIN — AMIODARONE HYDROCHLORIDE 200 MG: 200 TABLET ORAL at 19:49

## 2019-06-29 RX ADMIN — ZIPRASIDONE HYDROCHLORIDE 40 MG: 40 CAPSULE ORAL at 18:03

## 2019-06-29 RX ADMIN — Medication 10 ML: at 08:50

## 2019-06-29 RX ADMIN — SEVELAMER CARBONATE 800 MG: 800 TABLET, FILM COATED ORAL at 18:03

## 2019-06-29 RX ADMIN — MIDODRINE HYDROCHLORIDE 10 MG: 5 TABLET ORAL at 12:02

## 2019-06-29 RX ADMIN — ZIPRASIDONE HYDROCHLORIDE 40 MG: 40 CAPSULE ORAL at 08:51

## 2019-06-29 RX ADMIN — MIDODRINE HYDROCHLORIDE 10 MG: 5 TABLET ORAL at 08:51

## 2019-06-29 RX ADMIN — ESCITALOPRAM OXALATE 10 MG: 10 TABLET ORAL at 08:52

## 2019-06-29 RX ADMIN — MIDODRINE HYDROCHLORIDE 10 MG: 5 TABLET ORAL at 18:03

## 2019-06-29 RX ADMIN — CEFEPIME HYDROCHLORIDE 1 G: 1 INJECTION, POWDER, FOR SOLUTION INTRAMUSCULAR; INTRAVENOUS at 11:26

## 2019-06-29 RX ADMIN — MIRTAZAPINE 45 MG: 30 TABLET, FILM COATED ORAL at 19:49

## 2019-06-29 RX ADMIN — APIXABAN 2.5 MG: 2.5 TABLET, FILM COATED ORAL at 08:52

## 2019-06-29 RX ADMIN — INSULIN LISPRO 2 UNITS: 100 INJECTION, SOLUTION INTRAVENOUS; SUBCUTANEOUS at 11:58

## 2019-06-29 RX ADMIN — LEVETIRACETAM 500 MG: 500 TABLET, FILM COATED ORAL at 08:51

## 2019-06-29 RX ADMIN — Medication 10 ML: at 08:51

## 2019-06-29 RX ADMIN — Medication 10 ML: at 19:46

## 2019-06-29 RX ADMIN — IPRATROPIUM BROMIDE AND ALBUTEROL SULFATE 1 AMPULE: .5; 3 SOLUTION RESPIRATORY (INHALATION) at 20:13

## 2019-06-29 RX ADMIN — SEVELAMER CARBONATE 800 MG: 800 TABLET, FILM COATED ORAL at 08:51

## 2019-06-29 RX ADMIN — PANTOPRAZOLE SODIUM 40 MG: 40 TABLET, DELAYED RELEASE ORAL at 08:51

## 2019-06-29 RX ADMIN — FOLIC ACID 1 MG: 1 TABLET ORAL at 08:52

## 2019-06-29 RX ADMIN — SEVELAMER CARBONATE 800 MG: 800 TABLET, FILM COATED ORAL at 12:02

## 2019-06-29 RX ADMIN — SIMVASTATIN 10 MG: 10 TABLET, FILM COATED ORAL at 19:49

## 2019-06-29 RX ADMIN — IPRATROPIUM BROMIDE AND ALBUTEROL SULFATE 1 AMPULE: .5; 3 SOLUTION RESPIRATORY (INHALATION) at 08:11

## 2019-06-29 RX ADMIN — Medication 1 CAPSULE: at 08:52

## 2019-06-29 RX ADMIN — IPRATROPIUM BROMIDE AND ALBUTEROL SULFATE 1 AMPULE: .5; 3 SOLUTION RESPIRATORY (INHALATION) at 15:30

## 2019-06-29 RX ADMIN — AMIODARONE HYDROCHLORIDE 200 MG: 200 TABLET ORAL at 08:52

## 2019-06-29 RX ADMIN — IPRATROPIUM BROMIDE AND ALBUTEROL SULFATE 1 AMPULE: .5; 3 SOLUTION RESPIRATORY (INHALATION) at 12:04

## 2019-06-29 ASSESSMENT — PULMONARY FUNCTION TESTS
PIF_VALUE: 19
PIF_VALUE: 20
PIF_VALUE: 16
PIF_VALUE: 4
PIF_VALUE: 20
PIF_VALUE: 21

## 2019-06-29 ASSESSMENT — PAIN SCALES - GENERAL
PAINLEVEL_OUTOF10: 0

## 2019-06-29 NOTE — PLAN OF CARE
Problem: Falls - Risk of:  Goal: Will remain free from falls  Description  Will remain free from falls  Outcome: Ongoing     Problem: Risk for Impaired Skin Integrity  Goal: Tissue integrity - skin and mucous membranes  Description  Structural intactness and normal physiological function of skin and  mucous membranes.   Outcome: Ongoing     Problem: Suicide risk  Goal: Provide patient with safe environment  Description  Provide patient with safe environment  Outcome: Ongoing     Problem: Confusion - Acute:  Goal: Absence of continued neurological deterioration signs and symptoms  Description  Absence of continued neurological deterioration signs and symptoms  Outcome: Ongoing     Problem: Injury - Risk of, Physical Injury:  Goal: Will remain free from falls  Description  Will remain free from falls  Outcome: Ongoing

## 2019-06-29 NOTE — PROGRESS NOTES
Critical Care Team - Daily Progress Note      Date and time: 6/29/2019 8:15 AM  Patient's name:  Itzel Salazar Record Number: [id number]  Patient's account/billing number: [de-identified]  Patient's YOB: 1950  Age: 77 y.o.   Date of Admission: 6/21/2019  8:24 PM  Length of stay during current admission: 7      Primary Care Physician: Michael Bowen PA-C  ICU Attending Physician: Dr. Susanna Das    Code Status: DNR-CCA        SUBJECTIVE:     OVERNIGHT EVENTS:         Patient seen and examined bedside  Levophed stopped yesterday at 2:30 PM  Blood pressure systolic at 80 today  Was on BiPAP last night with pH 7.230, PCO2 69, PO2 81, bicarb 29  Respiratory rate increased from 16-18 back to pressure control of 12, PEEP of 8 FiO2 of 30  Patient currently on high flow with flow rate 35 L and FiO2 60%  On midodrine 10, 3 times daily    AWAKE & FOLLOWING COMMANDS:  [] No   [x] Yes    CURRENT VENTILATION STATUS:     [] Ventilator  [x] BIPAP  [x] Nasal Cannula [] Room Air      IF INTUBATED, ET TUBE MARKING AT LOWER LIP:       cms    SECRETIONS Amount:  [x] Small [] Moderate  [] Large  [] None  Color:     [x] White [] Colored  [] Bloody    SEDATION:  RAAS Score:  [] Propofol gtt  [] Versed gtt  [] Ativan gtt   [x] No Sedation    PARALYZED:  [x] No    [] Yes    DIARRHEA:                [x] No                [] Yes  (C. Difficile status: [] positive                                                                                                                       [] negative                                                                                                                     [] pending)    VASOPRESSORS:  [] No    [x] Yes    If yes -   [x] Levophed       [] Dopamine     [] Vasopressin       [] Dobutamine  [] Phenylephrine         [] Epinephrine        OBJECTIVE:     VITAL SIGNS:  BP (!) 95/40   Pulse 102   Temp 99.3 °F (37.4 °C) (Axillary)   Resp 24   Ht 5' 9\" (1.753 m)   Wt 166 lb 3.6 oz Patient Active Problem List    Diagnosis Date Noted    Thrombocytopenia ([facility] 06/22/2019     Priority: High    Renal cell carcinoma ([facility] 08/26/2013     Priority: High    Hypertension 09/13/2011     Priority: High    Generalized weakness 08/10/2016     Priority: Low     Class: Acute    Elevated troponin     Pleural effusion, right     Acute respiratory failure with hypoxia and hypercapnia (HCC)     Atrial fibrillation ([facility] 06/22/2019    History of renal cell carcinoma 06/22/2019    Pneumonia due to organism 05/15/2018    Dehydration with hyponatremia 05/15/2018    Anemia of chronic kidney failure 05/15/2018    Encounter for palliative care     Hypoxia 02/12/2018    Wheelchair dependent 11/15/2017    Vascular dementia without behavioral disturbance 09/05/2017    Delirium due to another medical condition 09/05/2017    Frequent falls     Suicidal ideation 11/26/2016    Subtherapeutic serum dilantin level 11/26/2016    Seizure disorder ([facility] 11/26/2016    ESRD on hemodialysis ([facility] 08/30/2016    Oropharyngeal dysphagia 07/20/2016    Mixed hyperlipidemia 05/16/2016    Osteoarthritis of left shoulder 07/29/2015    Left-sided weakness 07/16/2015    Cerebral vascular disease     Schizoaffective disorder ([facility] 11/12/2014    Kyphosis of thoracolumbar region 02/24/2014    Hyperkalemia 12/26/2013    Dyspnea 12/24/2013    Normochromic normocytic anemia 08/26/2013    Altered mental status 06/29/2013    Lumbar spondylosis 09/13/2011    Cerebral infarction ([facility] 09/13/2011    Lumbosacral ligament sprain 09/13/2011          PLAN:     WEAN PER PROTOCOL:  [] No   [x] Yes  [] N/A    DISCONTINUE ANY LABS:   [x] No   [] Yes    ICU PROPHYLAXIS:  Stress ulcer:  [x] PPI Agent  [] [id number] [] Sucralfate  [] Other:  VTE:   [] Enoxaparin  [] Unfract.  Heparin Subcut  [x] EPC Cuffs    NUTRITION:  [] NPO [] Tube Feeding (Specify: ) [] TPN  [x] PO (Diet: Dietary Nutrition Supplements: Renal Oral

## 2019-06-29 NOTE — PLAN OF CARE
Problem: Gas Exchange - Impaired:  Goal: Levels of oxygenation will improve  Description  Levels of oxygenation will improve  6/28/2019 2159 by Alexandre Bravo RCP  Outcome: Ongoing     Problem: Airway Clearance - Ineffective:  Goal: Ability to maintain a clear airway will improve  Description  Ability to maintain a clear airway will improve  6/28/2019 2159 by Alexandre Bravo RCP  Outcome: Ongoing     Problem: Skin Integrity - Impaired:  Goal: Will show no infection signs and symptoms  Description  Will show no infection signs and symptoms  6/28/2019 2159 by Alexandre Bravo RCP  Outcome: Ongoing     Problem: Skin Integrity - Impaired:  Goal: Absence of new skin breakdown  Description  Absence of new skin breakdown  6/28/2019 2159 by Alexandre Bravo RCP  Outcome: Ongoing

## 2019-06-29 NOTE — PLAN OF CARE
Problem: Falls - Risk of:  Goal: Will remain free from falls  Description  Will remain free from falls  Outcome: Ongoing     Problem: Falls - Risk of:  Goal: Absence of physical injury  Description  Absence of physical injury  Outcome: Ongoing     Problem: Risk for Impaired Skin Integrity  Goal: Tissue integrity - skin and mucous membranes  Description  Structural intactness and normal physiological function of skin and  mucous membranes.   Outcome: Ongoing     Problem: Confusion - Acute:  Goal: Mental status will be restored to baseline  Description  Mental status will be restored to baseline  Outcome: Ongoing     Problem: Discharge Planning:  Goal: Discharged to appropriate level of care  Description  Discharged to appropriate level of care  Outcome: Ongoing     Problem: Injury - Risk of, Physical Injury:  Goal: Will remain free from falls  Description  Will remain free from falls  Outcome: Ongoing     Problem: Discharge Planning:  Goal: Discharged to appropriate level of care  Description  Discharged to appropriate level of care  Outcome: Ongoing     Problem: Injury - Risk of, Physical Injury:  Goal: Will remain free from falls  Description  Will remain free from falls  Outcome: Ongoing     Problem: Injury - Risk of, Physical Injury:  Goal: Absence of physical injury  Description  Absence of physical injury  Outcome: Ongoing     Problem: Psychomotor Activity - Altered:  Goal: Absence of psychomotor disturbance signs and symptoms  Description  Absence of psychomotor disturbance signs and symptoms  Outcome: Ongoing     Problem: Nutrition  Goal: Optimal nutrition therapy  Outcome: Ongoing     Problem: Gas Exchange - Impaired:  Goal: Levels of oxygenation will improve  Description  Levels of oxygenation will improve  Outcome: Ongoing

## 2019-06-30 LAB
ABSOLUTE EOS #: 0 K/UL (ref 0–0.4)
ABSOLUTE IMMATURE GRANULOCYTE: 0 K/UL (ref 0–0.3)
ABSOLUTE LYMPH #: 0.43 K/UL (ref 1–4.8)
ABSOLUTE MONO #: 0.34 K/UL (ref 0.1–0.8)
ALLEN TEST: ABNORMAL
ANION GAP SERPL CALCULATED.3IONS-SCNC: 10 MMOL/L (ref 9–17)
BASOPHILS # BLD: 0 % (ref 0–2)
BASOPHILS ABSOLUTE: 0 K/UL (ref 0–0.2)
BUN BLDV-MCNC: 33 MG/DL (ref 8–23)
BUN/CREAT BLD: ABNORMAL (ref 9–20)
CALCIUM SERPL-MCNC: 9.6 MG/DL (ref 8.6–10.4)
CARBOXYHEMOGLOBIN: 2.1 % (ref 0–5)
CHLORIDE BLD-SCNC: 94 MMOL/L (ref 98–107)
CO2: 24 MMOL/L (ref 20–31)
CREAT SERPL-MCNC: 2.99 MG/DL (ref 0.7–1.2)
DIFFERENTIAL TYPE: ABNORMAL
EOSINOPHILS RELATIVE PERCENT: 0 % (ref 1–4)
FIO2: ABNORMAL
GFR AFRICAN AMERICAN: 25 ML/MIN
GFR NON-AFRICAN AMERICAN: 21 ML/MIN
GFR SERPL CREATININE-BSD FRML MDRD: ABNORMAL ML/MIN/{1.73_M2}
GFR SERPL CREATININE-BSD FRML MDRD: ABNORMAL ML/MIN/{1.73_M2}
GLUCOSE BLD-MCNC: 127 MG/DL (ref 75–110)
GLUCOSE BLD-MCNC: 165 MG/DL (ref 75–110)
GLUCOSE BLD-MCNC: 69 MG/DL (ref 75–110)
GLUCOSE BLD-MCNC: 78 MG/DL (ref 75–110)
GLUCOSE BLD-MCNC: 92 MG/DL (ref 70–99)
GLUCOSE BLD-MCNC: 94 MG/DL (ref 75–110)
HCO3 VENOUS: 25.4 MMOL/L (ref 24–30)
HCT VFR BLD CALC: 30.6 % (ref 40.7–50.3)
HEMOGLOBIN: 9.5 G/DL (ref 13–17)
IMMATURE GRANULOCYTES: 0 %
LYMPHOCYTES # BLD: 14 % (ref 24–44)
MCH RBC QN AUTO: 33.2 PG (ref 25.2–33.5)
MCHC RBC AUTO-ENTMCNC: 31 G/DL (ref 28.4–34.8)
MCV RBC AUTO: 107 FL (ref 82.6–102.9)
METHEMOGLOBIN: ABNORMAL % (ref 0–1.5)
MODE: ABNORMAL
MONOCYTES # BLD: 11 % (ref 1–7)
MORPHOLOGY: ABNORMAL
MORPHOLOGY: ABNORMAL
NEGATIVE BASE EXCESS, VEN: 2.6 MMOL/L (ref 0–2)
NOTIFICATION TIME: ABNORMAL
NOTIFICATION: ABNORMAL
NRBC AUTOMATED: 0 PER 100 WBC
O2 DEVICE/FLOW/%: ABNORMAL
O2 SAT, VEN: 90.4 % (ref 60–85)
OXYHEMOGLOBIN: ABNORMAL % (ref 95–98)
PATIENT TEMP: 37
PCO2, VEN, TEMP ADJ: ABNORMAL MMHG (ref 39–55)
PCO2, VEN: 66.2 (ref 39–55)
PDW BLD-RTO: 15.5 % (ref 11.8–14.4)
PEEP/CPAP: ABNORMAL
PH VENOUS: 7.21 (ref 7.32–7.42)
PH, VEN, TEMP ADJ: ABNORMAL (ref 7.32–7.42)
PLATELET # BLD: ABNORMAL K/UL (ref 138–453)
PLATELET ESTIMATE: ABNORMAL
PLATELET, FLUORESCENCE: 64 K/UL (ref 138–453)
PLATELET, IMMATURE FRACTION: 5 % (ref 1.1–10.3)
PMV BLD AUTO: ABNORMAL FL (ref 8.1–13.5)
PO2, VEN, TEMP ADJ: ABNORMAL MMHG (ref 30–50)
PO2, VEN: 55.9 (ref 30–50)
POSITIVE BASE EXCESS, VEN: ABNORMAL MMOL/L (ref 0–2)
POTASSIUM SERPL-SCNC: 3.6 MMOL/L (ref 3.7–5.3)
PSV: ABNORMAL
PT. POSITION: ABNORMAL
RBC # BLD: 2.86 M/UL (ref 4.21–5.77)
RBC # BLD: ABNORMAL 10*6/UL
RESPIRATORY RATE: ABNORMAL
SAMPLE SITE: ABNORMAL
SEG NEUTROPHILS: 75 % (ref 36–66)
SEGMENTED NEUTROPHILS ABSOLUTE COUNT: 2.33 K/UL (ref 1.8–7.7)
SET RATE: ABNORMAL
SODIUM BLD-SCNC: 128 MMOL/L (ref 135–144)
TEXT FOR RESPIRATORY: ABNORMAL
TOTAL HB: ABNORMAL G/DL (ref 12–16)
TOTAL RATE: ABNORMAL
VT: ABNORMAL
WBC # BLD: 3.1 K/UL (ref 3.5–11.3)
WBC # BLD: ABNORMAL 10*3/UL

## 2019-06-30 PROCEDURE — 6370000000 HC RX 637 (ALT 250 FOR IP): Performed by: STUDENT IN AN ORGANIZED HEALTH CARE EDUCATION/TRAINING PROGRAM

## 2019-06-30 PROCEDURE — 2580000003 HC RX 258: Performed by: INTERNAL MEDICINE

## 2019-06-30 PROCEDURE — 85055 RETICULATED PLATELET ASSAY: CPT

## 2019-06-30 PROCEDURE — 94660 CPAP INITIATION&MGMT: CPT

## 2019-06-30 PROCEDURE — 94640 AIRWAY INHALATION TREATMENT: CPT

## 2019-06-30 PROCEDURE — P9047 ALBUMIN (HUMAN), 25%, 50ML: HCPCS | Performed by: INTERNAL MEDICINE

## 2019-06-30 PROCEDURE — 6360000002 HC RX W HCPCS: Performed by: INTERNAL MEDICINE

## 2019-06-30 PROCEDURE — 2580000003 HC RX 258: Performed by: STUDENT IN AN ORGANIZED HEALTH CARE EDUCATION/TRAINING PROGRAM

## 2019-06-30 PROCEDURE — 80048 BASIC METABOLIC PNL TOTAL CA: CPT

## 2019-06-30 PROCEDURE — 85025 COMPLETE CBC W/AUTO DIFF WBC: CPT

## 2019-06-30 PROCEDURE — 6370000000 HC RX 637 (ALT 250 FOR IP): Performed by: INTERNAL MEDICINE

## 2019-06-30 PROCEDURE — 2000000000 HC ICU R&B

## 2019-06-30 PROCEDURE — 99233 SBSQ HOSP IP/OBS HIGH 50: CPT | Performed by: INTERNAL MEDICINE

## 2019-06-30 PROCEDURE — 36415 COLL VENOUS BLD VENIPUNCTURE: CPT

## 2019-06-30 PROCEDURE — 82947 ASSAY GLUCOSE BLOOD QUANT: CPT

## 2019-06-30 PROCEDURE — 94762 N-INVAS EAR/PLS OXIMTRY CONT: CPT

## 2019-06-30 PROCEDURE — 82805 BLOOD GASES W/O2 SATURATION: CPT

## 2019-06-30 PROCEDURE — 2700000000 HC OXYGEN THERAPY PER DAY

## 2019-06-30 PROCEDURE — 6370000000 HC RX 637 (ALT 250 FOR IP): Performed by: PSYCHIATRY & NEUROLOGY

## 2019-06-30 RX ORDER — ALBUMIN (HUMAN) 12.5 G/50ML
25 SOLUTION INTRAVENOUS ONCE
Status: COMPLETED | OUTPATIENT
Start: 2019-06-30 | End: 2019-06-30

## 2019-06-30 RX ORDER — ATROPINE SULFATE 0.4 MG/ML
AMPUL (ML) INJECTION
Status: DISPENSED
Start: 2019-06-30 | End: 2019-06-30

## 2019-06-30 RX ADMIN — Medication 10 ML: at 09:00

## 2019-06-30 RX ADMIN — Medication 1 CAPSULE: at 11:57

## 2019-06-30 RX ADMIN — MIRTAZAPINE 45 MG: 30 TABLET, FILM COATED ORAL at 21:30

## 2019-06-30 RX ADMIN — MIDODRINE HYDROCHLORIDE 10 MG: 5 TABLET ORAL at 08:59

## 2019-06-30 RX ADMIN — MIDODRINE HYDROCHLORIDE 10 MG: 5 TABLET ORAL at 18:04

## 2019-06-30 RX ADMIN — Medication 10 ML: at 21:34

## 2019-06-30 RX ADMIN — LEVETIRACETAM 500 MG: 500 TABLET, FILM COATED ORAL at 09:00

## 2019-06-30 RX ADMIN — IPRATROPIUM BROMIDE AND ALBUTEROL SULFATE 1 AMPULE: .5; 3 SOLUTION RESPIRATORY (INHALATION) at 08:06

## 2019-06-30 RX ADMIN — Medication 1 CAPSULE: at 08:59

## 2019-06-30 RX ADMIN — SEVELAMER CARBONATE 800 MG: 800 TABLET, FILM COATED ORAL at 18:04

## 2019-06-30 RX ADMIN — ESCITALOPRAM OXALATE 10 MG: 10 TABLET ORAL at 09:00

## 2019-06-30 RX ADMIN — Medication 1 CAPSULE: at 18:04

## 2019-06-30 RX ADMIN — CEFEPIME HYDROCHLORIDE 1 G: 1 INJECTION, POWDER, FOR SOLUTION INTRAMUSCULAR; INTRAVENOUS at 10:49

## 2019-06-30 RX ADMIN — DEXTROSE MONOHYDRATE 12.5 G: 500 INJECTION PARENTERAL at 18:21

## 2019-06-30 RX ADMIN — ALBUMIN (HUMAN) 25 G: 0.25 INJECTION, SOLUTION INTRAVENOUS at 13:06

## 2019-06-30 RX ADMIN — FOLIC ACID 1 MG: 1 TABLET ORAL at 09:00

## 2019-06-30 RX ADMIN — ZIPRASIDONE HYDROCHLORIDE 40 MG: 40 CAPSULE ORAL at 18:32

## 2019-06-30 RX ADMIN — SIMVASTATIN 10 MG: 10 TABLET, FILM COATED ORAL at 21:30

## 2019-06-30 RX ADMIN — MIDODRINE HYDROCHLORIDE 10 MG: 5 TABLET ORAL at 11:58

## 2019-06-30 RX ADMIN — PANTOPRAZOLE SODIUM 40 MG: 40 TABLET, DELAYED RELEASE ORAL at 09:00

## 2019-06-30 RX ADMIN — APIXABAN 2.5 MG: 2.5 TABLET, FILM COATED ORAL at 09:00

## 2019-06-30 RX ADMIN — APIXABAN 2.5 MG: 2.5 TABLET, FILM COATED ORAL at 21:30

## 2019-06-30 RX ADMIN — AMIODARONE HYDROCHLORIDE 200 MG: 200 TABLET ORAL at 09:00

## 2019-06-30 RX ADMIN — SEVELAMER CARBONATE 800 MG: 800 TABLET, FILM COATED ORAL at 11:58

## 2019-06-30 RX ADMIN — AMIODARONE HYDROCHLORIDE 200 MG: 200 TABLET ORAL at 21:30

## 2019-06-30 RX ADMIN — ZIPRASIDONE HYDROCHLORIDE 40 MG: 40 CAPSULE ORAL at 09:00

## 2019-06-30 RX ADMIN — SEVELAMER CARBONATE 800 MG: 800 TABLET, FILM COATED ORAL at 09:00

## 2019-06-30 ASSESSMENT — PAIN SCALES - GENERAL
PAINLEVEL_OUTOF10: 0

## 2019-06-30 ASSESSMENT — PULMONARY FUNCTION TESTS
PIF_VALUE: 21
PIF_VALUE: 19
PIF_VALUE: 19
PIF_VALUE: 16
PIF_VALUE: 20
PIF_VALUE: 19

## 2019-06-30 NOTE — PROGRESS NOTES
PROVIDE ADEQUATE OXYGENATION WITH ACCEPTABLE SP02/ABG'S    [x]  IDENTIFY APPROPRIATE OXYGEN THERAPY  [x]   MONITOR SP02/ABG'S AS NEEDED   [x]   PATIENT EDUCATION AS NEEDED    BRONCHOSPASM/BRONCHOCONSTRICTION     [x]         IMPROVE AERATION/BREATH SOUNDS  [x]   ADMINISTER BRONCHODILATOR THERAPY AS APPROPRIATE  [x]   ASSESS BREATH SOUNDS  [x]   IMPLEMENT AEROSOL/MDI PROTOCOL  [x]   PATIENT EDUCATION AS NEEDED    NON INVASIVE VENTILATION  PROVIDE OPTIMAL VENTILATION/ACCEPTABLE SP02  IMPLEMENT NON INVASIVE VENTILATION PROTOCOL  ASSESSMENT SKIN INTEGRITY  PATIENT EDUCATION AS NEEDED  BIPAP AS NEEDED

## 2019-06-30 NOTE — PROGRESS NOTES
3. 33 3.54 3.75 46 - 49 2.70 2.92 3.14 3.37 3.61 3.85 4.10 4.36   50 - 53 2.31 2.48 2.66 2.85 3.04 3.24 3.45 3.66 50 - 53 2.58 2.80 3.02 3.25 3.49 3.73 3.98 4.24   54 - 57 2.21 2.38 2.57 2.75 2.95 3.14 3.35 3.56 54 - 57 2.46 2.67 2.89 3.12 3.36 3.60 3.85 4.11   58 - 61 2.10 2.28 2.46 2.65 2.84 3.04 3.24 3.45 58 - 61 2.32 2.54 2.76 2.99 3.23 3.47 3.72 3.98   62 - 65 1.99 2.17 2.35 2.54 2.73 2.93 3.13 3.34 62 - 65 2.19 2.40 2.62 2.85 3.09 3.33 3.58 3.84   66 - 69 1.88 2.05 2.23 2.42 2.61 2.81 3.02 3.23 66 - 69 2.04 2.26 2.48 2.71 2.95 3.19 3.44 3.70   70+ 1.82 1.99 2.17 2.36 2.55 2.75 2.95 3.16 70+ 1.97 2.19 2.41 2.64 2.87 3.12 3.37 3.62             Predicted Peak Expiratory Flow Rate                                       Height (in)  Female       Height (in) Male           Age 64 63 56 61 58 73 78 74 Age            21 344 357 372 387 402 417 432 446  60 62 64 66 68 70 72 74 76   25 337 352 366 381 396 411 426 441 25 447 476 505 533 562 591 [phone]   30 329 344 359 374 389 404 419 434 30 437 466 494 523 [phone] 667   35 322 337 351 366 381 396 411 426 35 426 455 484 512 541 [phone] 657   40 314 329 344 359 374 389 404 419 40 416 445 473 502 531 559 588 617 647   45 307 322 336 351 366 381 396 411 45 405 434 463 491 520 549 577 606 636   50 299 314 329 344 359 374 389 404 50 395 424 452 481 510 538 567 596 625   55 292 307 321 336 351 366 381 396 55 384 413 442 470 499 528 556 585 615   60 284 299 314 329 344 359 374 389 60 374 403 431 460 489 517 546 575 605   65 277 292 306 321 336 351 366 381 65 363 392 421 449 478 507 535 564 594   70 269 284 299 314 329 344 359 374 70 353 382 410 439 468 496 525 554 583   75 261 274 289 305 319 334 348 364 75 344 372 400 429 458 487 515 544 573   80 253 266 282 296 312 327 342 356 80 335 362 390 419 448 476 [phone]

## 2019-06-30 NOTE — PROGRESS NOTES
11/26/2016    ESRD on hemodialysis ([facility] 08/30/2016    Oropharyngeal dysphagia 07/20/2016    Mixed hyperlipidemia 05/16/2016    Osteoarthritis of left shoulder 07/29/2015    Left-sided weakness 07/16/2015    Cerebral vascular disease     [facility]ive [facility] ([facility] 11/12/2014    Kyphosis of thoracolumbar region 02/24/2014    Hyperkalemia 12/26/2013    Dyspnea 12/24/2013    Normochromic normocytic anemia 08/26/2013    Altered mental status 06/29/2013    Lumbar spondylosis 09/13/2011    Cerebral infarction ([facility] 09/13/2011    Lumbosacral ligament sprain 09/13/2011          PLAN:     WEAN PER PROTOCOL:  [] No   [x] Yes  [] N/A    DISCONTINUE ANY LABS:   [x] No   [] Yes    ICU PROPHYLAXIS:  Stress ulcer:  [] PPI Agent  [] [id number] [] Sucralfate  [] Other:  VTE:   [] Enoxaparin  [] Unfract.  Heparin Subcut  [] EPC Cuffs    NUTRITION:  [] NPO [] Tube Feeding (Specify: ) [] TPN  [x] PO (Diet: Dietary Nutrition Supplements: Renal Oral Supplement  DIET DYSPHAGIA I PUREED;)    HOME MEDICATIONS RECONCILED: [x] No  [] Yes    INSULIN DRIP:   [x] No   [] Yes    CONSULTATION NEEDED:  [] No   [x] Yes    FAMILY UPDATED:    [x] No   [] Yes    TRANSFER OUT OF ICU:   [x] No   [] Yes    ADDITIONAL PLAN:    Neuro              - Metabolic encephalopathy              - Neuro checks per unit              - Pain control PRN              - Geodon 40 mg BID held 2/2 somnolence               - Keppra 500 mg qd for PMHx seizures and during dialysis              - Lexapro 10 mg qd              - Ativan prn for agitation               - Remeron 45 mg nightly               - CT head 6/24: no acute intracranial abnormality               - DNR CCA with no intubation      Cardiology              - PMHx CHTN               - New onset A Fib               - Amiodarone 200 mg BID PO               - Has not required Levophed overnight    - Keep SBP > 90               - Midodrine 10 mg TID for hypotension               - Eliquis 2.5 bid today, labs seen, chart reviewed, events noted since yesterday. He continued to require noninvasive ventilation and was off only for an hour at the most on high flow 45% and 25 L when he is off noninvasive ventilation, very poor cough secretion and upper airway when off BiPAP and somnolent. Sodium is stable at 128 hemoglobin is stable. He is not able to make the decision and it has been discussed with the daughter about the poor overall guarded prognosis, he is DNR CC arrest no intubation currently. Will discuss with family again today and continue with noninvasive ventilation and high flow when able to take off noninvasive ventilation, hemodialysis tomorrow.         Luis Ulloa MD  6/30/2019 12:28 PM

## 2019-06-30 NOTE — PLAN OF CARE
Problem: Falls - Risk of:  Goal: Will remain free from falls  Description  Will remain free from falls  Outcome: Ongoing     Problem: Falls - Risk of:  Goal: Absence of physical injury  Description  Absence of physical injury  Outcome: Ongoing     Problem: Risk for Impaired Skin Integrity  Goal: Tissue integrity - skin and mucous membranes  Description  Structural intactness and normal physiological function of skin and  mucous membranes.   Outcome: Ongoing     Problem: Discharge Planning:  Goal: Participates in care planning  Description  Participates in care planning  Outcome: Ongoing  Goal: Discharged to appropriate level of care  Description  Discharged to appropriate level of care  Outcome: Ongoing     Problem: Injury - Risk of, Physical Injury:  Goal: Will remain free from falls  Description  Will remain free from falls  Outcome: Ongoing  Goal: Absence of physical injury  Description  Absence of physical injury  Outcome: Ongoing     Problem: Discharge Planning:  Goal: Participates in care planning  Description  Participates in care planning  Outcome: Ongoing  Goal: Discharged to appropriate level of care  Description  Discharged to appropriate level of care  Outcome: Ongoing     Problem: Injury - Risk of, Physical Injury:  Goal: Will remain free from falls  Description  Will remain free from falls  Outcome: Ongoing  Goal: Absence of physical injury  Description  Absence of physical injury  Outcome: Ongoing     Problem: Mood - Altered:  Goal: Verbalizations of feeling emotionally comfortable while being cared for will increase  Description  Verbalizations of feeling emotionally comfortable while being cared for will increase  Outcome: Ongoing     Problem: Sleep Pattern Disturbance:  Goal: Appears well-rested  Description  Appears well-rested  Outcome: Ongoing     Problem: Gas Exchange - Impaired:  Goal: Levels of oxygenation will improve  Description  Levels of oxygenation will improve  Outcome: Ongoing

## 2019-06-30 NOTE — PROGRESS NOTES
15 100 % --   06/30/19 1445 (!) 109/41 97.7 °F (36.5 °C) -- 80 13 -- 161 lb 9.6 oz (73.3 kg)   06/30/19 1430 94/67 -- -- 72 14 -- --   06/30/19 1415 (!) 91/43 -- -- 77 13 -- --   06/30/19 1400 (!) 94/45 -- -- 81 15 100 % --   06/30/19 1300 (!) 85/39 -- -- 80 15 100 % --   06/30/19 1225 (!) 98/44 97.7 °F (36.5 °C) -- 80 -- -- 165 lb 5.5 oz (75 kg)   06/30/19 1206 -- -- -- 84 16 100 % --   06/30/19 1200 (!) 98/44 97.7 °F (36.5 °C) Axillary 85 16 100 % --   06/30/19 1100 (!) 106/48 -- -- 80 14 100 % --   06/30/19 1000 (!) 96/36 -- -- 82 16 100 % --   06/30/19 0944 -- -- -- 86 18 100 % --     General Appearance: Somnolent. On BIPAP. Head:  Normocephalic, no trauma  Eyes: Pupils equal, round, reactive to light and accommodation; extraocular movements intact; sclera anicteric; conjunctivae pink. No embolic phenomena. ENT: Oropharynx clear, without erythema, exudate, or thrush. No tenderness of sinuses. Mouth/throat: mucosa pink and moist. No lesions. Dentition in good repair. Neck:Supple, without lymphadenopathy. Thyroid normal, No bruits. Pulmonary/Chest: minimal aeration, scattered rhonchi, bilateral dullness to percussion. Cardiovascular: Irregular rate and rhythm without murmurs, rubs, or gallops. Abdomen: Soft, non tender. Bowel sounds hypoactive. No organomegaly  All four Extremities: No cyanosis, clubbing, edema, or effusions. Neurologic: Somnolent  Skin: Cool and dry with good turgor. Signs of peripheral arterial insufficiency. No ulcerations. No open wounds.     Medical Decision Making -Laboratory:   I have independently reviewed/ordered the following labs:    CBC with Differential:   Recent Labs     06/29/19  0432 06/30/19 0429   WBC 3.0* 3.1*   HGB 9.4* 9.5*   HCT 30.5* 30.6*   PLT See Reflexed IPF Result See Reflexed IPF Result   LYMPHOPCT 11* 14*   MONOPCT 16* 11*     BMP:   Recent Labs     06/28/19  0539 06/29/19 0432 06/30/19 0429   * 128* 128*   K 3.2* 3.6* 3.6*   CL 92* 94* 94*   CO2 26 Adenovirus PCR Latest Ref Range: Not Detected  Not Detected   B Pertussis by PCR Latest Ref Range: Not Detected  Not Detected   Chlamydia pneumoniae By PCR Latest Ref Range: Not Detected  Not Detected   Coronavirus 229E PCR Latest Ref Range: Not Detected  Not Detected   Coronavirus HKU1 PCR Latest Ref Range: Not Detected  Not Detected   Coronavirus NL63 PCR Latest Ref Range: Not Detected  Not Detected   Coronavirus OC Latest Ref Range: Not Detected  Not Detected   Human Metapneumovirus PCR Latest Ref Range: Not Detected  Not Detected   Influenza A by PCR Latest Ref Range: Not Detected  Not Detected   Influenza A H1 (2009) PCR Latest Ref Range: Not Detected  NOT REPORTED   Influenza A H1 PCR Latest Ref Range: Not Detected  NOT REPORTED   Influenza A H3 PCR Latest Ref Range: Not Detected  NOT REPORTED   Influenza B by PCR Latest Ref Range: Not Detected  Not Detected   Parainfluenza 1 PCR Latest Ref Range: Not Detected  Not Detected   Parainfluenza 2 PCR Latest Ref Range: Not Detected  Not Detected   Parainfluenza 3 PCR Latest Ref Range: Not Detected  Not Detected   Parainfluenza 4 PCR Latest Ref Range: Not Detected  Not Detected   Resp Syncytial Virus PCR Latest Ref Range: Not Detected  Not Detected   Rhino/Enterovirus PCR Latest Ref Range: Not Detected  Not Detected   Mycoplasma pneumo by PCR Latest Ref Range: Not Detected  Not Detected     Medical Decision Making-Other:     Note: Daily decision making includes:  Review of labs, medications, personal examination of radiologic studies  Review of Infection Control Prevention measures. Review of Antibiotic Stewardship data, when applicable. Discussion with nursing, wound care and discharge planning Staff, as applicable  Review of discharge planning  Determination of need for outpatient follow up  Determination of prognosis: Guarded  Review of medication administration as ordered.   Review of Large amounts of data      Thank you for allowing us to participate in the

## 2019-06-30 NOTE — PLAN OF CARE
Problem: Falls - Risk of:  Goal: Will remain free from falls  Description  Will remain free from falls  6/30/2019 0308 by Hussein Fox RN  Outcome: Ongoing     Problem: Risk for Impaired Skin Integrity  Goal: Tissue integrity - skin and mucous membranes  Description  Structural intactness and normal physiological function of skin and  mucous membranes.   6/30/2019 0308 by Hussein Fox RN  Outcome: Ongoing     Problem: Confusion - Acute:  Goal: Absence of continued neurological deterioration signs and symptoms  Description  Absence of continued neurological deterioration signs and symptoms  Outcome: Ongoing     Problem: Injury - Risk of, Physical Injury:  Goal: Will remain free from falls  Description  Will remain free from falls  6/30/2019 0308 by Hussein Fox RN  Outcome: Ongoing     Problem: Mood - Altered:  Goal: Mood stable  Description  Mood stable  Outcome: Ongoing     Problem: Sensory Perception - Impaired:  Goal: Demonstrations of improved sensory functioning will increase  Description  Demonstrations of improved sensory functioning will increase  Outcome: Ongoing     Problem: Sleep Pattern Disturbance:  Goal: Appears well-rested  Description  Appears well-rested  Outcome: Ongoing

## 2019-07-01 ENCOUNTER — APPOINTMENT (OUTPATIENT)
Dept: DIALYSIS | Age: 69
DRG: 193 | End: 2019-07-01
Payer: MEDICARE

## 2019-07-01 LAB
ABSOLUTE EOS #: 0 K/UL (ref 0–0.4)
ABSOLUTE IMMATURE GRANULOCYTE: 0 K/UL (ref 0–0.3)
ABSOLUTE LYMPH #: 0.35 K/UL (ref 1–4.8)
ABSOLUTE MONO #: 0.65 K/UL (ref 0.1–0.8)
ALBUMIN SERPL-MCNC: 3.6 G/DL (ref 3.5–5.2)
ALLEN TEST: ABNORMAL
ALLEN TEST: POSITIVE
ANION GAP SERPL CALCULATED.3IONS-SCNC: 14 MMOL/L (ref 9–17)
BASOPHILS # BLD: 0 % (ref 0–2)
BASOPHILS ABSOLUTE: 0 K/UL (ref 0–0.2)
BUN BLDV-MCNC: 44 MG/DL (ref 8–23)
BUN/CREAT BLD: ABNORMAL (ref 9–20)
CALCIUM SERPL-MCNC: 9.4 MG/DL (ref 8.6–10.4)
CARBOXYHEMOGLOBIN: 1.8 % (ref 0–5)
CHLORIDE BLD-SCNC: 92 MMOL/L (ref 98–107)
CO2: 23 MMOL/L (ref 20–31)
CREAT SERPL-MCNC: 3.56 MG/DL (ref 0.7–1.2)
DIFFERENTIAL TYPE: ABNORMAL
EOSINOPHILS RELATIVE PERCENT: 0 % (ref 1–4)
FIO2: 45
FIO2: ABNORMAL
GFR AFRICAN AMERICAN: 21 ML/MIN
GFR NON-AFRICAN AMERICAN: 17 ML/MIN
GFR SERPL CREATININE-BSD FRML MDRD: ABNORMAL ML/MIN/{1.73_M2}
GFR SERPL CREATININE-BSD FRML MDRD: ABNORMAL ML/MIN/{1.73_M2}
GLUCOSE BLD-MCNC: 158 MG/DL (ref 75–110)
GLUCOSE BLD-MCNC: 82 MG/DL (ref 75–110)
GLUCOSE BLD-MCNC: 83 MG/DL (ref 75–110)
GLUCOSE BLD-MCNC: 86 MG/DL (ref 75–110)
GLUCOSE BLD-MCNC: 91 MG/DL (ref 75–110)
GLUCOSE BLD-MCNC: 92 MG/DL (ref 70–99)
GLUCOSE BLD-MCNC: 96 MG/DL (ref 75–110)
HCO3 VENOUS: 23.7 MMOL/L (ref 24–30)
HCT VFR BLD CALC: 31.9 % (ref 40.7–50.3)
HEMOGLOBIN: 9.7 G/DL (ref 13–17)
IMMATURE GRANULOCYTES: 0 %
INTERVENTION: NORMAL
LYMPHOCYTES # BLD: 7 % (ref 24–44)
MCH RBC QN AUTO: 32.3 PG (ref 25.2–33.5)
MCHC RBC AUTO-ENTMCNC: 30.4 G/DL (ref 28.4–34.8)
MCV RBC AUTO: 106.3 FL (ref 82.6–102.9)
METHEMOGLOBIN: ABNORMAL % (ref 0–1.5)
MODE: ABNORMAL
MODE: ABNORMAL
MONOCYTES # BLD: 13 % (ref 1–7)
MORPHOLOGY: ABNORMAL
MORPHOLOGY: ABNORMAL
NEGATIVE BASE EXCESS, ART: 1 (ref 0–2)
NEGATIVE BASE EXCESS, VEN: 4.4 MMOL/L (ref 0–2)
NOTIFICATION TIME: ABNORMAL
NOTIFICATION: ABNORMAL
NRBC AUTOMATED: 0 PER 100 WBC
O2 DEVICE/FLOW/%: ABNORMAL
O2 DEVICE/FLOW/%: ABNORMAL
O2 SAT, VEN: 98.1 % (ref 60–85)
OXYHEMOGLOBIN: ABNORMAL % (ref 95–98)
PATIENT TEMP: 37
PATIENT TEMP: ABNORMAL
PCO2, VEN, TEMP ADJ: ABNORMAL MMHG (ref 39–55)
PCO2, VEN: 62.8 (ref 39–55)
PDW BLD-RTO: 15.5 % (ref 11.8–14.4)
PEEP/CPAP: ABNORMAL
PH VENOUS: 7.2 (ref 7.32–7.42)
PH, VEN, TEMP ADJ: ABNORMAL (ref 7.32–7.42)
PHOSPHORUS: 3.7 MG/DL (ref 2.5–4.5)
PLATELET # BLD: ABNORMAL K/UL (ref 138–453)
PLATELET ESTIMATE: ABNORMAL
PLATELET, FLUORESCENCE: 77 K/UL (ref 138–453)
PLATELET, IMMATURE FRACTION: 5.5 % (ref 1.1–10.3)
PMV BLD AUTO: ABNORMAL FL (ref 8.1–13.5)
PO2, VEN, TEMP ADJ: ABNORMAL MMHG (ref 30–50)
PO2, VEN: 117 (ref 30–50)
POC HCO3: 27.4 MMOL/L (ref 21–28)
POC O2 SATURATION: 99 % (ref 94–98)
POC PCO2 TEMP: ABNORMAL MM HG
POC PCO2: 66.5 MM HG (ref 35–48)
POC PH TEMP: ABNORMAL
POC PH: 7.22 (ref 7.35–7.45)
POC PO2 TEMP: ABNORMAL MM HG
POC PO2: 158.5 MM HG (ref 83–108)
POSITIVE BASE EXCESS, ART: ABNORMAL (ref 0–3)
POSITIVE BASE EXCESS, VEN: ABNORMAL MMOL/L (ref 0–2)
POTASSIUM SERPL-SCNC: 3.6 MMOL/L (ref 3.7–5.3)
PSV: ABNORMAL
PT. POSITION: ABNORMAL
RBC # BLD: 3 M/UL (ref 4.21–5.77)
RBC # BLD: ABNORMAL 10*6/UL
RESPIRATORY RATE: ABNORMAL
SAMPLE SITE: ABNORMAL
SAMPLE SITE: ABNORMAL
SEG NEUTROPHILS: 80 % (ref 36–66)
SEGMENTED NEUTROPHILS ABSOLUTE COUNT: 4 K/UL (ref 1.8–7.7)
SET RATE: ABNORMAL
SODIUM BLD-SCNC: 129 MMOL/L (ref 135–144)
TCO2 (CALC), ART: 29 MMOL/L (ref 22–29)
TEXT FOR RESPIRATORY: ABNORMAL
TOTAL HB: ABNORMAL G/DL (ref 12–16)
TOTAL RATE: ABNORMAL
VT: ABNORMAL
WBC # BLD: 5 K/UL (ref 3.5–11.3)
WBC # BLD: ABNORMAL 10*3/UL

## 2019-07-01 PROCEDURE — P9047 ALBUMIN (HUMAN), 25%, 50ML: HCPCS | Performed by: INTERNAL MEDICINE

## 2019-07-01 PROCEDURE — 82803 BLOOD GASES ANY COMBINATION: CPT

## 2019-07-01 PROCEDURE — 6370000000 HC RX 637 (ALT 250 FOR IP): Performed by: STUDENT IN AN ORGANIZED HEALTH CARE EDUCATION/TRAINING PROGRAM

## 2019-07-01 PROCEDURE — 85025 COMPLETE CBC W/AUTO DIFF WBC: CPT

## 2019-07-01 PROCEDURE — 2700000000 HC OXYGEN THERAPY PER DAY

## 2019-07-01 PROCEDURE — 36600 WITHDRAWAL OF ARTERIAL BLOOD: CPT

## 2019-07-01 PROCEDURE — 6360000002 HC RX W HCPCS: Performed by: INTERNAL MEDICINE

## 2019-07-01 PROCEDURE — 82805 BLOOD GASES W/O2 SATURATION: CPT

## 2019-07-01 PROCEDURE — 36415 COLL VENOUS BLD VENIPUNCTURE: CPT

## 2019-07-01 PROCEDURE — 99291 CRITICAL CARE FIRST HOUR: CPT | Performed by: INTERNAL MEDICINE

## 2019-07-01 PROCEDURE — 6370000000 HC RX 637 (ALT 250 FOR IP): Performed by: INTERNAL MEDICINE

## 2019-07-01 PROCEDURE — 84100 ASSAY OF PHOSPHORUS: CPT

## 2019-07-01 PROCEDURE — 85055 RETICULATED PLATELET ASSAY: CPT

## 2019-07-01 PROCEDURE — 2000000000 HC ICU R&B

## 2019-07-01 PROCEDURE — 90935 HEMODIALYSIS ONE EVALUATION: CPT

## 2019-07-01 PROCEDURE — 2580000003 HC RX 258: Performed by: INTERNAL MEDICINE

## 2019-07-01 PROCEDURE — 82947 ASSAY GLUCOSE BLOOD QUANT: CPT

## 2019-07-01 PROCEDURE — 80048 BASIC METABOLIC PNL TOTAL CA: CPT

## 2019-07-01 PROCEDURE — 94762 N-INVAS EAR/PLS OXIMTRY CONT: CPT

## 2019-07-01 PROCEDURE — 94660 CPAP INITIATION&MGMT: CPT

## 2019-07-01 PROCEDURE — 6370000000 HC RX 637 (ALT 250 FOR IP): Performed by: PSYCHIATRY & NEUROLOGY

## 2019-07-01 PROCEDURE — 82040 ASSAY OF SERUM ALBUMIN: CPT

## 2019-07-01 PROCEDURE — 2580000003 HC RX 258: Performed by: STUDENT IN AN ORGANIZED HEALTH CARE EDUCATION/TRAINING PROGRAM

## 2019-07-01 PROCEDURE — 92610 EVALUATE SWALLOWING FUNCTION: CPT

## 2019-07-01 PROCEDURE — 99233 SBSQ HOSP IP/OBS HIGH 50: CPT | Performed by: INTERNAL MEDICINE

## 2019-07-01 RX ORDER — ALBUMIN (HUMAN) 12.5 G/50ML
25 SOLUTION INTRAVENOUS ONCE
Status: COMPLETED | OUTPATIENT
Start: 2019-07-01 | End: 2019-07-01

## 2019-07-01 RX ORDER — MIDODRINE HYDROCHLORIDE 5 MG/1
10 TABLET ORAL PRN
Status: DISCONTINUED | OUTPATIENT
Start: 2019-07-01 | End: 2019-07-05

## 2019-07-01 RX ADMIN — SENNOSIDES 8.6 MG: 8.6 TABLET, FILM COATED ORAL at 08:07

## 2019-07-01 RX ADMIN — MIDODRINE HYDROCHLORIDE 10 MG: 5 TABLET ORAL at 10:08

## 2019-07-01 RX ADMIN — ESCITALOPRAM OXALATE 10 MG: 10 TABLET ORAL at 08:07

## 2019-07-01 RX ADMIN — CEFEPIME HYDROCHLORIDE 1 G: 1 INJECTION, POWDER, FOR SOLUTION INTRAMUSCULAR; INTRAVENOUS at 13:09

## 2019-07-01 RX ADMIN — ALBUMIN (HUMAN) 25 G: 0.25 INJECTION, SOLUTION INTRAVENOUS at 10:03

## 2019-07-01 RX ADMIN — DARBEPOETIN ALFA 40 MCG: 40 INJECTION, SOLUTION INTRAVENOUS; SUBCUTANEOUS at 13:06

## 2019-07-01 RX ADMIN — MIDODRINE HYDROCHLORIDE 10 MG: 5 TABLET ORAL at 08:06

## 2019-07-01 RX ADMIN — LEVETIRACETAM 500 MG: 500 TABLET, FILM COATED ORAL at 08:06

## 2019-07-01 RX ADMIN — DOXERCALCIFEROL 3.5 MCG: 4 INJECTION, SOLUTION INTRAVENOUS at 13:08

## 2019-07-01 RX ADMIN — Medication 1 CAPSULE: at 11:40

## 2019-07-01 RX ADMIN — APIXABAN 2.5 MG: 2.5 TABLET, FILM COATED ORAL at 08:07

## 2019-07-01 RX ADMIN — ACETAMINOPHEN 500 MG: 500 TABLET ORAL at 02:52

## 2019-07-01 RX ADMIN — APIXABAN 2.5 MG: 2.5 TABLET, FILM COATED ORAL at 21:13

## 2019-07-01 RX ADMIN — AMIODARONE HYDROCHLORIDE 200 MG: 200 TABLET ORAL at 08:07

## 2019-07-01 RX ADMIN — MIDODRINE HYDROCHLORIDE 10 MG: 5 TABLET ORAL at 17:16

## 2019-07-01 RX ADMIN — Medication 1 CAPSULE: at 16:13

## 2019-07-01 RX ADMIN — ZIPRASIDONE HYDROCHLORIDE 40 MG: 40 CAPSULE ORAL at 16:13

## 2019-07-01 RX ADMIN — Medication 10 ML: at 21:13

## 2019-07-01 RX ADMIN — ZIPRASIDONE HYDROCHLORIDE 40 MG: 40 CAPSULE ORAL at 08:07

## 2019-07-01 RX ADMIN — MIRTAZAPINE 45 MG: 30 TABLET, FILM COATED ORAL at 21:13

## 2019-07-01 RX ADMIN — Medication 10 ML: at 08:06

## 2019-07-01 RX ADMIN — FOLIC ACID 1 MG: 1 TABLET ORAL at 08:06

## 2019-07-01 RX ADMIN — Medication 1 CAPSULE: at 08:07

## 2019-07-01 RX ADMIN — INSULIN LISPRO 2 UNITS: 100 INJECTION, SOLUTION INTRAVENOUS; SUBCUTANEOUS at 12:25

## 2019-07-01 RX ADMIN — Medication 10 ML: at 08:08

## 2019-07-01 RX ADMIN — SEVELAMER CARBONATE 800 MG: 800 TABLET, FILM COATED ORAL at 16:13

## 2019-07-01 RX ADMIN — MIDODRINE HYDROCHLORIDE 10 MG: 5 TABLET ORAL at 11:40

## 2019-07-01 RX ADMIN — AMIODARONE HYDROCHLORIDE 200 MG: 200 TABLET ORAL at 21:13

## 2019-07-01 RX ADMIN — SEVELAMER CARBONATE 800 MG: 800 TABLET, FILM COATED ORAL at 08:07

## 2019-07-01 RX ADMIN — SEVELAMER CARBONATE 800 MG: 800 TABLET, FILM COATED ORAL at 11:40

## 2019-07-01 RX ADMIN — PANTOPRAZOLE SODIUM 40 MG: 40 TABLET, DELAYED RELEASE ORAL at 08:08

## 2019-07-01 RX ADMIN — SIMVASTATIN 10 MG: 10 TABLET, FILM COATED ORAL at 21:13

## 2019-07-01 ASSESSMENT — PULMONARY FUNCTION TESTS
PIF_VALUE: 14
PIF_VALUE: 15
PIF_VALUE: 15
PIF_VALUE: 20
PIF_VALUE: 18
PIF_VALUE: 15
PIF_VALUE: 26
PIF_VALUE: 15
PIF_VALUE: 26

## 2019-07-01 ASSESSMENT — PAIN SCALES - GENERAL
PAINLEVEL_OUTOF10: 0

## 2019-07-01 ASSESSMENT — PAIN - FUNCTIONAL ASSESSMENT: PAIN_FUNCTIONAL_ASSESSMENT: 0-10

## 2019-07-01 NOTE — PROGRESS NOTES
Critical Care Team - Daily Progress Note      Date and time: 7/1/2019 7:03 AM  Patient's name:  Itzel Salazar Record Number: [id number]  Patient's account/billing number: [de-identified]  Patient's YOB: 1950  Age: 77 y.o. Date of Admission: 6/21/2019  8:24 PM  Length of stay during current admission: 9      Primary Care Physician: Rachel Pettit PA-C  ICU Attending Physician: Dr. Case Cartagena     Code Status: DNR-CCA - No intubation     Reason for ICU admission: New Onset A fib, possible pneumonia       SUBJECTIVE:     OVERNIGHT EVENTS:         Pt seen and examined bedside. Asleep, can be awoken, AAOX2. Does not know the date. Does not appear in acute distress but does appear uncomfortable. Required BIPAP overnight is currently saturating on HFNC. Blood pressure controlled on midodrine TID. Follows commands. Afebrile.      AWAKE & FOLLOWING COMMANDS:  [] No   [x] Yes    CURRENT VENTILATION STATUS:     [] Ventilator  [] BIPAP  [x] Nasal Cannula [] Room Air      SEDATION:  RAAS Score:  [] Propofol gtt  [] Versed gtt  [] Ativan gtt   [x] No Sedation    PARALYZED:  [x] No    [] Yes    DIARRHEA:                [x] No                [] Yes  (C. Difficile status: [] positive                                                                                                                       [] negative                                                                                                                     [] pending)    VASOPRESSORS:  [x] No    [] Yes    If yes -   [] Levophed       [] Dopamine     [] Vasopressin       [] Dobutamine  [] Phenylephrine         [] Epinephrine    CENTRAL LINES:     [] No   [] Yes   (Date of Insertion:   )           If yes -     [] Right IJ     [] Left IJ [] Right Femoral [] Left Femoral                   [] Right Subclavian [] Left Subclavian      URINE OUTPUT:            [] Good   [] Low              [x] Anuric      OBJECTIVE:     VITAL SIGNS:  BP (!) 90/39 Pulse 89   Temp 98.4 °F (36.9 °C) (Axillary)   Resp 19   Ht 5' 9\" (1.753 m)   Wt 164 lb 0.4 oz (74.4 kg)   SpO2 98%   BMI 24.22 kg/m²     Tmax over 24 hours:  Temp (24hrs), Av.7 °F (36.5 °C), Min:97.2 °F (36.2 °C), Max:98.4 °F (36.9 °C)      Patient Vitals for the past 6 hrs:   BP Temp Temp src Pulse Resp SpO2 Weight   19 0600 (!) 90/39 -- -- 89 19 98 % --   19 0550 -- -- -- -- -- -- 164 lb 0.4 oz (74.4 kg)   19 0500 -- 98.4 °F (36.9 °C) Axillary -- -- -- --   19 0400 (!) 104/51 -- -- 84 19 100 % --   19 0336 -- -- -- 87 18 100 % --   19 0300 (!) 108/58 -- -- 84 19 100 % --   19 0200 (!) 117/56 -- -- 85 17 100 % --   19 0121 -- -- -- -- 18 100 % --         Intake/Output Summary (Last 24 hours) at 2019 0703  Last data filed at 2019 0026  Gross per 24 hour   Intake 262 ml   Output 2400 ml   Net -2138 ml     Wt Readings from Last 2 Encounters:   19 164 lb 0.4 oz (74.4 kg)   19 165 lb (74.8 kg)     Body mass index is 24.22 kg/m².         PHYSICAL EXAMINATION:  General appearance - in no distress  Mental status - disoriented to time, drowsy, follows commands   Eyes - pupils equal and reactive, extraocular eye movements intact  Ears - right ear normal, left ear normal  Nose - normal and patent, no erythema, discharge or polyps  Mouth - mucous membranes moist, pharynx normal without lesions  Neck - supple, no significant adenopathy  Chest - clear to auscultation, no wheezes, rales or rhonchi, symmetric air entry  Heart - normal rate and regular rhythm, no murmurs noted  Abdomen - soft, nontender, nondistended, no masses or organomegaly  Neurological - no focal findings or movement disorder noted  Extremities - peripheral pulses intact but weak, no pedal edema, no clubbing or cyanosis  Skin - normal coloration and turgor    MEDICATIONS:    Scheduled Meds:   midodrine  10 mg Oral TID WC    doxercalciferol  3.5 mcg Intravenous Q MWF    apixaban resuscitation or intubation. Total critical care time caring for this patient with life threatening, unstable organ failure, including direct patient contact, management of life support systems, review of data including imaging and labs, discussions with other team members and physicians at least 27  Min so far today, excluding procedures.           Jeet Naranjo MD  7/1/2019 3:27 PM

## 2019-07-01 NOTE — PLAN OF CARE
PROVIDE ADEQUATE OXYGENATION WITH ACCEPTABLE SP02/ABG'S    [x]  IDENTIFY APPROPRIATE OXYGEN THERAPY  [x]   MONITOR SP02/ABG'S AS NEEDED   [x]   PATIENT EDUCATION AS NEEDED    NONINVASIVE VENTILATION    PROVIDE OPTIMAL VENTILATION/ACCEPTABLE SPO2   IMPLEMENT NONINVASIVE VENTILATION PROTOCOL   MAINTAIN ACCEPTABLE SPO2   ASSESS SKIN INTEGRITY/BREAKDOWN SCORE   PATIENT EDUCATION AS NEEDED   BIPAP AS NEEDED

## 2019-07-01 NOTE — PROGRESS NOTES
B Pertussis by PCR Latest Ref Range: Not Detected  Not Detected   Chlamydia pneumoniae By PCR Latest Ref Range: Not Detected  Not Detected   Coronavirus 229E PCR Latest Ref Range: Not Detected  Not Detected   Coronavirus HKU1 PCR Latest Ref Range: Not Detected  Not Detected   Coronavirus NL63 PCR Latest Ref Range: Not Detected  Not Detected   Coronavirus OC Latest Ref Range: Not Detected  Not Detected   Human Metapneumovirus PCR Latest Ref Range: Not Detected  Not Detected   Influenza A by PCR Latest Ref Range: Not Detected  Not Detected   Influenza A H1 (2009) PCR Latest Ref Range: Not Detected  NOT REPORTED   Influenza A H1 PCR Latest Ref Range: Not Detected  NOT REPORTED   Influenza A H3 PCR Latest Ref Range: Not Detected  NOT REPORTED   Influenza B by PCR Latest Ref Range: Not Detected  Not Detected   Parainfluenza 1 PCR Latest Ref Range: Not Detected  Not Detected   Parainfluenza 2 PCR Latest Ref Range: Not Detected  Not Detected   Parainfluenza 3 PCR Latest Ref Range: Not Detected  Not Detected   Parainfluenza 4 PCR Latest Ref Range: Not Detected  Not Detected   Resp Syncytial Virus PCR Latest Ref Range: Not Detected  Not Detected   Rhino/Enterovirus PCR Latest Ref Range: Not Detected  Not Detected   Mycoplasma pneumo by PCR Latest Ref Range: Not Detected  Not Detected     Medical Decision Making-Other:     Note: Daily decision making includes:  Review of labs, medications, personal examination of radiologic studies  Review of Infection Control Prevention measures. Review of Antibiotic Stewardship data, when applicable. Discussion with nursing, wound care and discharge planning Staff, as applicable  Review of discharge planning  Determination of need for outpatient follow up  Determination of prognosis: Guarded  Review of medication administration as ordered. Review of Large amounts of data      Thank you for allowing us to participate in the care of this patient. Please call with questions.     Karine Do Kady Ulloa MD  Pager: [phone] - Office: [phone]

## 2019-07-01 NOTE — PROGRESS NOTES
Speech Language Pathology  Facility/Department: [address] SICU   CLINICAL BEDSIDE SWALLOW EVALUATION    NAME: Tracey Rossi  : 1950  MRN: [id number]    ADMISSION DATE: 2019  ADMITTING DIAGNOSIS: has Hypertension; Lumbar spondylosis; Cerebral infarction ([address] Lumbosacral ligament sprain; Normochromic normocytic anemia; Renal cell carcinoma ([address] Altered mental status; Dyspnea; Hyperkalemia; Kyphosis of thoracolumbar region; Schizoaffective disorder ([address] Cerebral vascular disease; Left-sided weakness; Osteoarthritis of left shoulder; Mixed hyperlipidemia; Oropharyngeal dysphagia; Generalized weakness; ESRD on hemodialysis ([address] Suicidal ideation; Subtherapeutic serum dilantin level; Seizure disorder ([address] Frequent falls; Vascular dementia without behavioral disturbance; Delirium due to another medical condition; Wheelchair dependent; Hypoxia; Encounter for palliative care; Pneumonia due to organism; Dehydration with hyponatremia; Anemia of chronic kidney failure; Atrial fibrillation ([address] History of renal cell carcinoma; Thrombocytopenia ([address] Elevated troponin; Pleural effusion, right; and Acute respiratory failure with hypoxia and hypercapnia (HCC) on their problem list.    Recent Chest Xray: (  19  )    Impression   Persistent findings of pulmonary edema with pleural effusions and atelectasis.             Date of Eval: 2019  Evaluating Therapist: Emanuel Schwarz    Current Diet level:  Current Diet : NPO  Current Liquid Diet : NPO      Primary Complaint: Per chart, Tracey Rossi is a 77 y.o. gentleman with significant past medical history of end-stage renal disease on dialysis, , dry weight 160 Lbs, history of hypertension, history of renal cell carcinoma status post embolization, asthma presented with generalized weakness starting approximately 2 days. Per patient he has been feeling weak after dialysis and had not recovered since.  Admitted to have

## 2019-07-02 LAB
ABSOLUTE EOS #: 0.03 K/UL (ref 0–0.4)
ABSOLUTE IMMATURE GRANULOCYTE: 0 K/UL (ref 0–0.3)
ABSOLUTE LYMPH #: 0.56 K/UL (ref 1–4.8)
ABSOLUTE MONO #: 0.36 K/UL (ref 0.1–0.8)
ALBUMIN SERPL-MCNC: 3.5 G/DL (ref 3.5–5.2)
ALBUMIN/GLOBULIN RATIO: 1.6 (ref 1–2.5)
ALP BLD-CCNC: 98 U/L (ref 40–129)
ALT SERPL-CCNC: 7 U/L (ref 5–41)
AMMONIA: 17 UMOL/L (ref 16–60)
ANION GAP SERPL CALCULATED.3IONS-SCNC: 9 MMOL/L (ref 9–17)
AST SERPL-CCNC: 12 U/L
BASOPHILS # BLD: 0 % (ref 0–2)
BASOPHILS ABSOLUTE: 0 K/UL (ref 0–0.2)
BILIRUB SERPL-MCNC: 0.52 MG/DL (ref 0.3–1.2)
BILIRUBIN DIRECT: 0.21 MG/DL
BILIRUBIN, INDIRECT: 0.31 MG/DL (ref 0–1)
BUN BLDV-MCNC: 26 MG/DL (ref 8–23)
BUN/CREAT BLD: ABNORMAL (ref 9–20)
CALCIUM SERPL-MCNC: 9.3 MG/DL (ref 8.6–10.4)
CHLORIDE BLD-SCNC: 95 MMOL/L (ref 98–107)
CO2: 26 MMOL/L (ref 20–31)
CORTISOL COLLECTION INFO: NORMAL
CORTISOL: 8.3 UG/DL (ref 2.7–18.4)
CREAT SERPL-MCNC: 2.51 MG/DL (ref 0.7–1.2)
DIFFERENTIAL TYPE: ABNORMAL
EOSINOPHILS RELATIVE PERCENT: 1 % (ref 1–4)
GFR AFRICAN AMERICAN: 31 ML/MIN
GFR NON-AFRICAN AMERICAN: 26 ML/MIN
GFR SERPL CREATININE-BSD FRML MDRD: ABNORMAL ML/MIN/{1.73_M2}
GFR SERPL CREATININE-BSD FRML MDRD: ABNORMAL ML/MIN/{1.73_M2}
GLOBULIN: ABNORMAL G/DL (ref 1.5–3.8)
GLUCOSE BLD-MCNC: 108 MG/DL (ref 75–110)
GLUCOSE BLD-MCNC: 77 MG/DL (ref 70–99)
GLUCOSE BLD-MCNC: 92 MG/DL (ref 75–110)
GLUCOSE BLD-MCNC: 99 MG/DL (ref 75–110)
HCT VFR BLD CALC: 27.9 % (ref 40.7–50.3)
HEMOGLOBIN: 8.7 G/DL (ref 13–17)
IMMATURE GRANULOCYTES: 0 %
LYMPHOCYTES # BLD: 17 % (ref 24–44)
MAGNESIUM: 1.7 MG/DL (ref 1.6–2.6)
MCH RBC QN AUTO: 33.1 PG (ref 25.2–33.5)
MCHC RBC AUTO-ENTMCNC: 31.2 G/DL (ref 28.4–34.8)
MCV RBC AUTO: 106.1 FL (ref 82.6–102.9)
MONOCYTES # BLD: 11 % (ref 1–7)
MORPHOLOGY: ABNORMAL
MORPHOLOGY: ABNORMAL
NRBC AUTOMATED: 0 PER 100 WBC
PDW BLD-RTO: 15.4 % (ref 11.8–14.4)
PLATELET # BLD: 68 K/UL (ref 138–453)
PLATELET ESTIMATE: ABNORMAL
PMV BLD AUTO: 11.7 FL (ref 8.1–13.5)
POTASSIUM SERPL-SCNC: 3.4 MMOL/L (ref 3.7–5.3)
RBC # BLD: 2.63 M/UL (ref 4.21–5.77)
RBC # BLD: ABNORMAL 10*6/UL
SEG NEUTROPHILS: 71 % (ref 36–66)
SEGMENTED NEUTROPHILS ABSOLUTE COUNT: 2.35 K/UL (ref 1.8–7.7)
SODIUM BLD-SCNC: 130 MMOL/L (ref 135–144)
TOTAL PROTEIN: 5.7 G/DL (ref 6.4–8.3)
WBC # BLD: 3.3 K/UL (ref 3.5–11.3)
WBC # BLD: ABNORMAL 10*3/UL

## 2019-07-02 PROCEDURE — 6370000000 HC RX 637 (ALT 250 FOR IP): Performed by: INTERNAL MEDICINE

## 2019-07-02 PROCEDURE — 94762 N-INVAS EAR/PLS OXIMTRY CONT: CPT

## 2019-07-02 PROCEDURE — 6370000000 HC RX 637 (ALT 250 FOR IP): Performed by: STUDENT IN AN ORGANIZED HEALTH CARE EDUCATION/TRAINING PROGRAM

## 2019-07-02 PROCEDURE — 2700000000 HC OXYGEN THERAPY PER DAY

## 2019-07-02 PROCEDURE — 2580000003 HC RX 258: Performed by: STUDENT IN AN ORGANIZED HEALTH CARE EDUCATION/TRAINING PROGRAM

## 2019-07-02 PROCEDURE — 2580000003 HC RX 258: Performed by: INTERNAL MEDICINE

## 2019-07-02 PROCEDURE — 6370000000 HC RX 637 (ALT 250 FOR IP): Performed by: PSYCHIATRY & NEUROLOGY

## 2019-07-02 PROCEDURE — 93970 EXTREMITY STUDY: CPT

## 2019-07-02 PROCEDURE — 99291 CRITICAL CARE FIRST HOUR: CPT | Performed by: INTERNAL MEDICINE

## 2019-07-02 PROCEDURE — 6360000002 HC RX W HCPCS: Performed by: STUDENT IN AN ORGANIZED HEALTH CARE EDUCATION/TRAINING PROGRAM

## 2019-07-02 PROCEDURE — 85025 COMPLETE CBC W/AUTO DIFF WBC: CPT

## 2019-07-02 PROCEDURE — 82140 ASSAY OF AMMONIA: CPT

## 2019-07-02 PROCEDURE — 2500000003 HC RX 250 WO HCPCS: Performed by: STUDENT IN AN ORGANIZED HEALTH CARE EDUCATION/TRAINING PROGRAM

## 2019-07-02 PROCEDURE — 2000000000 HC ICU R&B

## 2019-07-02 PROCEDURE — 80076 HEPATIC FUNCTION PANEL: CPT

## 2019-07-02 PROCEDURE — 82533 TOTAL CORTISOL: CPT

## 2019-07-02 PROCEDURE — 80048 BASIC METABOLIC PNL TOTAL CA: CPT

## 2019-07-02 PROCEDURE — 99233 SBSQ HOSP IP/OBS HIGH 50: CPT | Performed by: INTERNAL MEDICINE

## 2019-07-02 PROCEDURE — 83735 ASSAY OF MAGNESIUM: CPT

## 2019-07-02 PROCEDURE — 82947 ASSAY GLUCOSE BLOOD QUANT: CPT

## 2019-07-02 PROCEDURE — 94660 CPAP INITIATION&MGMT: CPT

## 2019-07-02 RX ORDER — MAGNESIUM SULFATE 1 G/100ML
1 INJECTION INTRAVENOUS ONCE
Status: COMPLETED | OUTPATIENT
Start: 2019-07-02 | End: 2019-07-02

## 2019-07-02 RX ORDER — POTASSIUM CHLORIDE 20 MEQ/1
40 TABLET, EXTENDED RELEASE ORAL PRN
Status: DISCONTINUED | OUTPATIENT
Start: 2019-07-02 | End: 2019-07-02

## 2019-07-02 RX ORDER — POTASSIUM CHLORIDE 7.45 MG/ML
10 INJECTION INTRAVENOUS PRN
Status: DISCONTINUED | OUTPATIENT
Start: 2019-07-02 | End: 2019-07-02

## 2019-07-02 RX ADMIN — APIXABAN 2.5 MG: 2.5 TABLET, FILM COATED ORAL at 20:21

## 2019-07-02 RX ADMIN — ACETAMINOPHEN 500 MG: 500 TABLET ORAL at 11:37

## 2019-07-02 RX ADMIN — SIMVASTATIN 10 MG: 10 TABLET, FILM COATED ORAL at 20:21

## 2019-07-02 RX ADMIN — ZIPRASIDONE HYDROCHLORIDE 40 MG: 40 CAPSULE ORAL at 20:21

## 2019-07-02 RX ADMIN — Medication 10 ML: at 20:22

## 2019-07-02 RX ADMIN — ESCITALOPRAM OXALATE 10 MG: 10 TABLET ORAL at 09:50

## 2019-07-02 RX ADMIN — FOLIC ACID 1 MG: 1 TABLET ORAL at 09:50

## 2019-07-02 RX ADMIN — MAGNESIUM SULFATE HEPTAHYDRATE 1 G: 1 INJECTION, SOLUTION INTRAVENOUS at 11:16

## 2019-07-02 RX ADMIN — Medication 10 ML: at 09:53

## 2019-07-02 RX ADMIN — Medication 10 ML: at 20:21

## 2019-07-02 RX ADMIN — Medication 1 CAPSULE: at 09:00

## 2019-07-02 RX ADMIN — MIRTAZAPINE 45 MG: 30 TABLET, FILM COATED ORAL at 20:21

## 2019-07-02 RX ADMIN — SEVELAMER CARBONATE 800 MG: 800 TABLET, FILM COATED ORAL at 11:37

## 2019-07-02 RX ADMIN — MIDODRINE HYDROCHLORIDE 10 MG: 5 TABLET ORAL at 09:00

## 2019-07-02 RX ADMIN — PANTOPRAZOLE SODIUM 40 MG: 40 TABLET, DELAYED RELEASE ORAL at 09:00

## 2019-07-02 RX ADMIN — Medication 2 MCG/MIN: at 08:16

## 2019-07-02 RX ADMIN — ACETAMINOPHEN 500 MG: 500 TABLET ORAL at 02:43

## 2019-07-02 RX ADMIN — LEVETIRACETAM 500 MG: 500 TABLET, FILM COATED ORAL at 09:30

## 2019-07-02 RX ADMIN — AMIODARONE HYDROCHLORIDE 200 MG: 200 TABLET ORAL at 20:21

## 2019-07-02 RX ADMIN — ZIPRASIDONE HYDROCHLORIDE 40 MG: 40 CAPSULE ORAL at 09:00

## 2019-07-02 RX ADMIN — MIDODRINE HYDROCHLORIDE 10 MG: 5 TABLET ORAL at 11:37

## 2019-07-02 RX ADMIN — AMIODARONE HYDROCHLORIDE 200 MG: 200 TABLET ORAL at 09:50

## 2019-07-02 RX ADMIN — SEVELAMER CARBONATE 800 MG: 800 TABLET, FILM COATED ORAL at 09:00

## 2019-07-02 RX ADMIN — Medication 10 ML: at 09:50

## 2019-07-02 ASSESSMENT — PULMONARY FUNCTION TESTS
PIF_VALUE: 15
PIF_VALUE: 16
PIF_VALUE: 17
PIF_VALUE: 18
PIF_VALUE: 14
PIF_VALUE: 15
PIF_VALUE: 16
PIF_VALUE: 15
PIF_VALUE: 14
PIF_VALUE: 15
PIF_VALUE: 14
PIF_VALUE: 15
PIF_VALUE: 14
PIF_VALUE: 15
PIF_VALUE: 1
PIF_VALUE: 16
PIF_VALUE: 18
PIF_VALUE: 14
PIF_VALUE: 15
PIF_VALUE: 13
PIF_VALUE: 15
PIF_VALUE: 15
PIF_VALUE: 16
PIF_VALUE: 14

## 2019-07-02 ASSESSMENT — PAIN SCALES - GENERAL
PAINLEVEL_OUTOF10: 0

## 2019-07-02 NOTE — CONSULTS
PALLIATIVE CARE TEAM    Patient: Brianna Amezcua  Room: 0106/0106-01    Reason For Consult   Goals of care evaluation  Distress management  Symptom Management  Guidance and support  Facilitate communications  Assistance in coordinating care  Recommendations for the above    Impression: Brianna Amezcua is a 77y.o. year old male with  has a past medical history of Atrial fibrillation ([facility] Bell's palsy, Chronic kidney disease, Contusion, Depression, Diabetes mellitus ([facility] [address] [facility] Diarrhea, Frequent falls, Hemodialysis patient ([facility] [address] [facility] Hyperkalemia, Hyperlipidemia, Hypertension, Neuropathy, Obesity, Osteoarthritis, Pneumonia, Renal cancer ([facility] [address] Renal failure, Seizures ([facility] [address] Sprain of hip or thigh, left, Unspecified cerebral artery occlusion with cerebral infarction, and Wears dentures. .  Currently hospitalized for the management of respiratory arrest.  The Palliative Care Team is following to assist with support.      Code Status  DNR-CCA    Vital Signs:  BP (!) 99/45   Pulse 80   Temp 98.6 °F (37 °C) (Axillary)   Resp 20   Ht 5' 2\" (1.575 m)   Wt 164 lb 7.4 oz (74.6 kg)   SpO2 100%   BMI 30.08 kg/m²     Patient Active Problem List   Diagnosis    Hypertension    Lumbar spondylosis    Cerebral infarction (HCC)    Lumbosacral ligament sprain    Normochromic normocytic anemia    Renal cell carcinoma (HCC)    Altered mental status    Dyspnea    Hyperkalemia    Kyphosis of thoracolumbar region    Schizoaffective disorder ([facility]    Cerebral vascular disease    Left-sided weakness    Osteoarthritis of left shoulder    Mixed hyperlipidemia    Oropharyngeal dysphagia    Generalized weakness    ESRD on hemodialysis (HCC)    Suicidal ideation    Subtherapeutic serum dilantin level    Seizure disorder (HCC)    Frequent falls    Vascular dementia without behavioral disturbance    Delirium due to another medical condition    Wheelchair dependent    Hypoxia    Encounter for palliative care    Pneumonia

## 2019-07-02 NOTE — PLAN OF CARE
stability will improve  Description  Hemodynamic stability will improve  Outcome: Ongoing     Problem: Nutrition Deficit:  Goal: Ability to achieve adequate nutritional intake will improve  Description  Ability to achieve adequate nutritional intake will improve  Outcome: Ongoing     Problem: Serum Glucose Level - Abnormal:  Goal: Ability to maintain appropriate glucose levels will improve to within specified parameters  Description  Ability to maintain appropriate glucose levels will improve to within specified parameters  Outcome: Ongoing     Problem: Skin Integrity - Impaired:  Goal: Will show no infection signs and symptoms  Description  Will show no infection signs and symptoms  Outcome: Ongoing  Goal: Absence of new skin breakdown  Description  Absence of new skin breakdown  Outcome: Ongoing     Problem: Tissue Perfusion, Altered:  Goal: Circulatory function within specified parameters  Description  Circulatory function within specified parameters  Outcome: Ongoing     Problem: Tissue Perfusion - Cardiopulmonary, Altered:  Goal: Absence of angina  Description  Absence of angina  Outcome: Ongoing  Goal: Hemodynamic stability will improve  Description  Hemodynamic stability will improve  Outcome: Ongoing

## 2019-07-02 NOTE — PROGRESS NOTES
Critical Care Team - Daily Progress Note      Date and time: 7/2/2019 8:53 AM  Patient's name:  Noel Nguyen  Medical Record Number: [id number]  Patient's account/billing number: [de-identified]  Patient's YOB: 1950  Age: 77 y.o. Date of Admission: 6/21/2019  8:24 PM  Length of stay during current admission: 10      Primary Care Physician: Seamus Colbert PA-C  ICU Attending Physician: Dr. Ingrid Edwards     Code Status: DNR-CCA    Reason for ICU admission:   Chief Complaint   Patient presents with    Shortness of Breath    Atrial Fibrillation     new onset         SUBJECTIVE:     OVERNIGHT EVENTS:         Pt seen and evaluated a bedside. On BiPAP this AM  Following commands intermittently  Mentation waxing and weaning  SBP in 90s this AM and MAP 52  Patient was started on Levophed 2 mcg   Patient was dialyzed yesterday and tolerated well.   However no significant improvement in respiratory status    AWAKE & FOLLOWING COMMANDS:  [] No   [x] Yes    CURRENT VENTILATION STATUS:     [] Ventilator  [x] BIPAP  [] Nasal Cannula [] Room Air      IF INTUBATED, ET TUBE MARKING AT LOWER LIP:       cms    SECRETIONS Amount:  [] Small [] Moderate  [] Large  [x] None  Color:     [] White [] Colored  [] Bloody    SEDATION:  RAAS Score:  [] Propofol gtt  [] Versed gtt  [] Ativan gtt   [x] No Sedation    PARALYZED:  [x] No    [] Yes    DIARRHEA:                [x] No                [] Yes  (C. Difficile status: [] positive                                                                                                                       [] negative                                                                                                                     [] pending)    VASOPRESSORS:  [] No    [x] Yes    If yes -   [x] Levophed       [] Dopamine     [] Vasopressin       [] Dobutamine  [] Phenylephrine         [] Epinephrine    CENTRAL LINES:     [] No   [x] Yes   (Date of Insertion:   )           If yes - [] Right IJ     [] Left IJ [] Right Femoral [] Left Femoral                   [] Right Subclavian [] Left Subclavian       TIRADO'S CATHETER:   [] No   [] Yes  (Date of Insertion:   )     URINE OUTPUT:            [] Good   [] Low              [] Anuric      OBJECTIVE:     VITAL SIGNS:  BP (!) 98/41   Pulse 99   Temp 98.1 °F (36.7 °C) (Oral)   Resp 22   Ht 5' 9\" (1.753 m)   Wt 164 lb 7.4 oz (74.6 kg)   SpO2 95%   BMI 24.29 kg/m²   Tmax over 24 hours:  Temp (24hrs), Av.3 °F (36.8 °C), Min:97.9 °F (36.6 °C), Max:98.8 °F (37.1 °C)      Patient Vitals for the past 8 hrs:   BP Temp Temp src Pulse Resp SpO2 Weight   19 0732 -- -- -- 99 22 95 % --   19 0700 (!) 98/41 -- -- 88 21 99 % --   19 0600 (!) 98/39 -- -- 90 20 97 % --   19 0546 -- -- -- -- -- -- 164 lb 7.4 oz (74.6 kg)   19 0500 (!) 103/48 -- -- 88 17 100 % --   19 0400 (!) 103/46 98.1 °F (36.7 °C) Oral 86 18 100 % --   19 0350 -- -- -- -- 20 100 % --   19 0344 -- -- -- 82 20 100 % --   19 0300 (!) 109/45 -- -- 85 18 100 % --   19 0200 (!) 107/43 -- -- 86 18 100 % --   19 0100 (!) 105/41 -- -- 83 19 100 % --         Intake/Output Summary (Last 24 hours) at 2019 0853  Last data filed at 2019 0350  Gross per 24 hour   Intake 1250 ml   Output 1040 ml   Net 210 ml     Date 19 0000 - 19 2359   Shift 6632-9192 6669-7076 8978-3864 24 Hour Total   INTAKE   I.V.(mL/kg) 41(0.5)   41(0.5)   Shift Total(mL/kg) 41(0.5)   41(0.5)   OUTPUT   Shift Total(mL/kg)       Weight (kg) 74.6 74.6 74.6 74.6     Wt Readings from Last 3 Encounters:   19 164 lb 7.4 oz (74.6 kg)   19 165 lb (74.8 kg)   19 152 lb (68.9 kg)     Body mass index is 24.29 kg/m². PHYSICAL EXAM:  Constitutional: Appears well, no distress, BiPAP   EENT: sclera clear, anicteric, no lesions, neck supple with midline trachea.   Neck: Supple, symmetrical, trachea midline, no adenopathy, thyroid symmetric, input(s): IONCA in the last 72 hours. Urinalysis: No results found for: NITRU, COLORU, PHUR, LABCAST, WBCUA, RBCUA, MUCUS, TRICHOMONAS, YEAST, BACTERIA, CLARITYU, SPECGRAV, LEUKOCYTESUR, UROBILINOGEN, BILIRUBINUR, BLOODU, GLUCOSEU, KETUA, AMORPHOUS    CARDIAC ENZYMES: No results for input(s): CKMB, CKMBINDEX, TROPONINI in the last 72 hours. Invalid input(s): CKTOTAL;3  BNP: No results for input(s): BNP in the last 72 hours. LFTS  Recent Labs     07/01/19  0548   LABALBU 3.6       AMYLASE/LIPASE/AMMONIA  No results for input(s): AMYLASE, LIPASE, AMMONIA in the last 72 hours. Last 3 Blood Glucose:   Recent Labs     06/30/19  0429 07/01/19  0548 07/02/19  0415   GLUCOSE 92 92 77      HgBA1c:    Lab Results   Component Value Date    LABA1C 4.4 05/24/2018         TSH:    Lab Results   Component Value Date    TSH 2.12 06/22/2019     ANEMIA STUDIES  No results for input(s): LABIRON, TIBC, FERRITIN, CBLSOAPU20, FOLATE, OCCULTBLD in the last 72 hours. Cultures during this admission:     Blood cultures:                 [] None drawn      [] Negative             []  Positive (Details:  )  Urine Culture:                   [] None drawn      [] Negative             []  Positive (Details:  )  Sputum Culture:               [] None drawn       [] Negative             []  Positive (Details:  )   Endotracheal aspirate:     [] None drawn       [] Negative             []  Positive (Details:  )             Chest Xray (7/2/2019):    ASSESSMENT:     Principal Problem:    Atrial fibrillation ([address]  Active Problems:     Thrombocytopenia (HCC)    Generalized weakness    Schizoaffective disorder ([address]    ESRD on hemodialysis ([address]    Seizure disorder ([address]    Wheelchair dependent    Pneumonia due to organism    Anemia of chronic kidney failure    History of renal cell carcinoma    Elevated troponin    Pleural effusion, right    Acute respiratory failure with hypoxia and hypercapnia (HCC)  Resolved Problems:    * No

## 2019-07-02 NOTE — PLAN OF CARE
Problem: Falls - Risk of:  Goal: Will remain free from falls  Description  Will remain free from falls  7/2/2019 0037 by Db Bello RN  Outcome: Ongoing  7/1/2019 1431 by Ronda Krause RN  Outcome: Ongoing  Goal: Absence of physical injury  Description  Absence of physical injury  7/2/2019 0037 by Db Bello RN  Outcome: Ongoing  7/1/2019 1431 by Ronda Krause RN  Outcome: Ongoing     Problem: Risk for Impaired Skin Integrity  Goal: Tissue integrity - skin and mucous membranes  Description  Structural intactness and normal physiological function of skin and  mucous membranes.   7/2/2019 0037 by Db Bello RN  Outcome: Ongoing  7/1/2019 1431 by Ronda Krause RN  Outcome: Ongoing     Problem: Suicide risk  Description  Suicide risk  Goal: Provide patient with safe environment  Description  Provide patient with safe environment  7/2/2019 0037 by Db Bello RN  Outcome: Ongoing  7/1/2019 1431 by Ronda Krause RN  Outcome: Ongoing     Problem: Confusion - Acute:  Goal: Absence of continued neurological deterioration signs and symptoms  Description  Absence of continued neurological deterioration signs and symptoms  7/2/2019 0037 by Db Bello RN  Outcome: Ongoing  7/1/2019 1431 by Ronda Krause RN  Outcome: Ongoing  Goal: Mental status will be restored to baseline  Description  Mental status will be restored to baseline  7/2/2019 0037 by Db Bello RN  Outcome: Ongoing  7/1/2019 1431 by Ronda Krause RN  Outcome: Ongoing     Problem: Discharge Planning:  Goal: Ability to perform activities of daily living will improve  Description  Ability to perform activities of daily living will improve  7/2/2019 0037 by Db Bello RN  Outcome: Ongoing  7/1/2019 1431 by Ronda Krause RN  Outcome: Ongoing  Goal: Participates in care planning  Description  Participates in care planning  7/2/2019 0037 by Db Bello RN  Outcome: Ongoing  7/1/2019 1431 by Cruz Brenner RN  Outcome: Ongoing  Goal: Discharged to appropriate level of care  Description  Discharged to appropriate level of care  7/2/2019 0037 by Edgar Beckman RN  Outcome: Ongoing  7/1/2019 1431 by Cruz Brenner RN  Outcome: Ongoing     Problem: Injury - Risk of, Physical Injury:  Goal: Will remain free from falls  Description  Will remain free from falls  7/2/2019 0037 by Edgar Beckman RN  Outcome: Ongoing  7/1/2019 1431 by Cruz Brenner RN  Outcome: Ongoing  Goal: Absence of physical injury  Description  Absence of physical injury  7/2/2019 0037 by Edgar Beckman RN  Outcome: Ongoing  7/1/2019 1431 by Cruz Brenner RN  Outcome: Ongoing     Problem: Mood - Altered:  Goal: Mood stable  Description  Mood stable  7/2/2019 0037 by Edgar Beckman RN  Outcome: Ongoing  7/1/2019 1431 by Cruz Brenner RN  Outcome: Ongoing  Goal: Absence of abusive behavior  Description  Absence of abusive behavior  7/2/2019 0037 by Edgar Beckman RN  Outcome: Ongoing  7/1/2019 1431 by Cruz Brenner RN  Outcome: Ongoing  Goal: Verbalizations of feeling emotionally comfortable while being cared for will increase  Description  Verbalizations of feeling emotionally comfortable while being cared for will increase  7/2/2019 0037 by Edgar Beckman RN  Outcome: Ongoing  7/1/2019 1431 by Cruz Brenner RN  Outcome: Ongoing     Problem: Psychomotor Activity - Altered:  Goal: Absence of psychomotor disturbance signs and symptoms  Description  Absence of psychomotor disturbance signs and symptoms  7/2/2019 0037 by Edgar Beckman RN  Outcome: Ongoing  7/1/2019 1431 by Cruz Brenner RN  Outcome: Ongoing     Problem: Sensory Perception - Impaired:  Goal: Demonstrations of improved sensory functioning will increase  Description  Demonstrations of improved sensory functioning will increase  7/2/2019 0037 by Edgar Beckman RN  Outcome: Ongoing  7/1/2019 1431 by Cruz Brenner RN  Outcome: improve  Description  Ability to maintain a clear airway will improve  7/2/2019 0037 by Syl Do RN  Outcome: Ongoing  7/1/2019 1431 by Jaren Gomez RN  Outcome: Ongoing     Problem: Anxiety/Stress:  Goal: Level of anxiety will decrease  Description  Level of anxiety will decrease  7/2/2019 0037 by Syl Do RN  Outcome: Ongoing  7/1/2019 1431 by Jaren Gomez RN  Outcome: Ongoing     Problem: Aspiration:  Goal: Absence of aspiration  Description  Absence of aspiration  7/2/2019 0037 by Syl Do RN  Outcome: Ongoing  7/1/2019 1431 by Jaren Gomez RN  Outcome: Ongoing     Problem: Cardiac Output - Decreased:  Goal: Hemodynamic stability will improve  Description  Hemodynamic stability will improve  7/2/2019 0037 by Syl Do RN  Outcome: Ongoing  7/1/2019 1431 by Jaren Gomez RN  Outcome: Ongoing     Problem: Nutrition Deficit:  Goal: Ability to achieve adequate nutritional intake will improve  Description  Ability to achieve adequate nutritional intake will improve  7/2/2019 0037 by Syl Do RN  Outcome: Ongoing  7/1/2019 1431 by Jaren Gomez RN  Outcome: Ongoing     Problem: Serum Glucose Level - Abnormal:  Goal: Ability to maintain appropriate glucose levels will improve to within specified parameters  Description  Ability to maintain appropriate glucose levels will improve to within specified parameters  7/2/2019 0037 by Syl Do RN  Outcome: Ongoing  7/1/2019 1431 by Jaren Gomez RN  Outcome: Ongoing     Problem: Skin Integrity - Impaired:  Goal: Will show no infection signs and symptoms  Description  Will show no infection signs and symptoms  7/2/2019 0037 by Syl Do RN  Outcome: Ongoing  7/1/2019 1431 by Jaren Gomez RN  Outcome: Ongoing  Goal: Absence of new skin breakdown  Description  Absence of new skin breakdown  7/2/2019 0037 by Syl Do RN  Outcome: Ongoing  7/1/2019 1431 by Jaren Gomez RN  Outcome: Ongoing     Problem: Tissue Perfusion, Altered:  Goal: Circulatory function within specified parameters  Description  Circulatory function within specified parameters  7/2/2019 0037 by Adolfo Chavarria RN  Outcome: Ongoing  7/1/2019 1431 by Isis Partida RN  Outcome: Ongoing     Problem: Tissue Perfusion - Cardiopulmonary, Altered:  Goal: Absence of angina  Description  Absence of angina  7/2/2019 0037 by Adolfo Chavarria RN  Outcome: Ongoing  7/1/2019 1431 by sIis Partida RN  Outcome: Ongoing  Goal: Hemodynamic stability will improve  Description  Hemodynamic stability will improve  7/2/2019 0037 by Adolfo Chavarria RN  Outcome: Ongoing  7/1/2019 1431 by Isis Partida RN  Outcome: Ongoing

## 2019-07-02 NOTE — PROGRESS NOTES
BRONCHOSPASM/BRONCHOCONSTRICTION     [x]         IMPROVE AERATION/BREATH SOUNDS  [x]   ADMINISTER BRONCHODILATOR THERAPY AS APPROPRIATE  [x]   ASSESS BREATH SOUNDS  [facility]. Atrial fibrillation ([facility] [I48.91]  Atrial fibrillation ([facility] [I48.91] . Vitals:    07/02/19 0900   BP: (!) 105/43   Pulse: 81   Resp: 19   Temp:    SpO2: 100%   . Patients home meds are   Prior to Admission medications    Medication Sig Start Date End Date Taking?  Authorizing Provider   acetaminophen (SM PAIN RELIEVER) 325 MG tablet Take 2 tablets by mouth every 6 hours as needed for Pain 6/20/19   Mert Hall PA-C   ziprasidone (GEODON) 40 MG capsule Take 1 capsule by mouth 2 times daily (with meals) 6/13/19   Mert Hall PA-C   folic acid (FOLVITE) 1 MG tablet Take 1 tablet by mouth daily 6/5/19   Mert Hall PA-C   levETIRAcetam (KEPPRA) 500 MG tablet Take 1 tablet by mouth 2 times daily 6/3/19   Mert Hall PA-C   Lancets MISC TEST BLOOD SUGAR TWICE DAILY 5/28/19   Mert Hall PA-C   simvastatin (ZOCOR) 10 MG tablet Take 1 tablet by mouth nightly 4/17/19   Mert Hall PA-C   metoprolol succinate (TOPROL XL) 50 MG extended release tablet Take 1 tablet by mouth nightly 4/12/19   Mert Hall PA-C   escitalopram (LEXAPRO) 10 MG tablet Take 1 tablet by mouth daily 3/14/19   Mert Hall PA-C   lisinopril (PRINIVIL;ZESTRIL) 10 MG tablet Take 1 tablet by mouth daily 3/14/19   Mert Hall PA-C   clopidogrel (PLAVIX) 75 MG tablet Take 1 tablet by mouth daily 3/11/19   Mert Hall PA-C   amLODIPine (NORVASC) 5 MG tablet Take 1 tablet by mouth daily 3/10/19   Mert Hall PA-C   omeprazole (PRILOSEC) 20 MG delayed release capsule Take 1 capsule by mouth Daily 3/10/19   Mert Hall PA-C   sevelamer (RENVELA) 800 MG tablet  1/28/19   Historical Provider, MD   mirtazapine (REMERON) 45 MG tablet Take 1 tablet by mouth nightly 2/25/19

## 2019-07-02 NOTE — PROGRESS NOTES
BID   levETIRAcetam (KEPPRA) tablet 500 mg Daily   norepinephrine (LEVOPHED) 16 mg in dextrose 5% 250 mL infusion Continuous   sodium chloride flush 0.9 % injection 10 mL 2 times per day   sodium chloride flush 0.9 % injection 10 mL PRN   albuterol (PROVENTIL) nebulizer solution 2.5 mg Q2H PRN   escitalopram (LEXAPRO) tablet 10 mg Daily   folic acid (FOLVITE) tablet 1 mg Daily   mirtazapine (REMERON) tablet 45 mg Nightly   pantoprazole (PROTONIX) tablet 40 mg QAM AC   sevelamer (RENVELA) tablet 800 mg TID WC   simvastatin (ZOCOR) tablet 10 mg Nightly   ziprasidone (GEODON) capsule 40 mg BID WC   sodium chloride flush 0.9 % injection 10 mL 2 times per day   sodium chloride flush 0.9 % injection 10 mL PRN   magnesium hydroxide (MILK OF MAGNESIA) 400 MG/5ML suspension 30 mL Daily PRN   ondansetron (ZOFRAN) injection 4 mg Q6H PRN   senna (SENOKOT) tablet 8.6 mg Daily PRN   acetaminophen (TYLENOL) tablet 500 mg Q4H PRN   albuterol (PROVENTIL) nebulizer solution 2.5 mg As Directed RT PRN   insulin lispro (HUMALOG) injection vial 0-12 Units TID WC   insulin lispro (HUMALOG) injection vial 0-6 Units Nightly   glucose (GLUTOSE) 40 % oral gel 15 g PRN   dextrose 50 % IV solution PRN   glucagon (rDNA) injection 1 mg PRN   dextrose 5 % solution PRN     Labs:   CBC:   Recent Labs     06/30/19 0429 07/01/19 0548 07/02/19 0415   WBC 3.1* 5.0 3.3*   RBC 2.86* 3.00* 2.63*   HGB 9.5* 9.7* 8.7*   HCT 30.6* 31.9* 27.9*   PLT See Reflexed IPF Result See Reflexed IPF Result 68*   MPV NOT REPORTED NOT REPORTED 11.7      BMP:   Recent Labs     06/30/19 0429 07/01/19  0548 07/02/19 0415   * 129* 130*   K 3.6* 3.6* 3.4*   CL 94* 92* 95*   CO2 24 23 26   BUN 33* 44* 26*   CREATININE 2.99* 3.56* 2.51*   GLUCOSE 92 92 77   CALCIUM 9.6 9.4 9.3        Phosphorus:    Recent Labs     07/01/19  0548   PHOS 3.7     Magnesium:   Recent Labs     07/02/19  0415   MG 1.7     Albumin:   Recent Labs     07/01/19  0548   LABALBU 3.6

## 2019-07-03 LAB
ABSOLUTE EOS #: 0.04 K/UL (ref 0–0.44)
ABSOLUTE IMMATURE GRANULOCYTE: 0.04 K/UL (ref 0–0.3)
ABSOLUTE LYMPH #: 0.67 K/UL (ref 1.1–3.7)
ABSOLUTE MONO #: 0.63 K/UL (ref 0.1–1.2)
ALLEN TEST: POSITIVE
ANION GAP SERPL CALCULATED.3IONS-SCNC: 16 MMOL/L (ref 9–17)
ANION GAP: 5 MMOL/L (ref 7–16)
BASOPHILS # BLD: 1 % (ref 0–2)
BASOPHILS ABSOLUTE: 0.04 K/UL (ref 0–0.2)
BUN BLDV-MCNC: 36 MG/DL (ref 8–23)
BUN/CREAT BLD: ABNORMAL (ref 9–20)
CALCIUM SERPL-MCNC: 10.2 MG/DL (ref 8.6–10.4)
CHLORIDE BLD-SCNC: 92 MMOL/L (ref 98–107)
CO2: 22 MMOL/L (ref 20–31)
CREAT SERPL-MCNC: 3.19 MG/DL (ref 0.7–1.2)
DIFFERENTIAL TYPE: ABNORMAL
EOSINOPHILS RELATIVE PERCENT: 1 % (ref 1–4)
FIO2: 30
GFR AFRICAN AMERICAN: 23 ML/MIN
GFR NON-AFRICAN AMERICAN: 19 ML/MIN
GFR NON-AFRICAN AMERICAN: 19 ML/MIN
GFR SERPL CREATININE-BSD FRML MDRD: 23 ML/MIN
GFR SERPL CREATININE-BSD FRML MDRD: ABNORMAL ML/MIN/{1.73_M2}
GLUCOSE BLD-MCNC: 103 MG/DL (ref 74–100)
GLUCOSE BLD-MCNC: 124 MG/DL (ref 75–110)
GLUCOSE BLD-MCNC: 147 MG/DL (ref 75–110)
GLUCOSE BLD-MCNC: 150 MG/DL (ref 75–110)
GLUCOSE BLD-MCNC: 176 MG/DL (ref 75–110)
GLUCOSE BLD-MCNC: 99 MG/DL (ref 70–99)
HCT VFR BLD CALC: 31.2 % (ref 40.7–50.3)
HEMOGLOBIN: 9.5 G/DL (ref 13–17)
IMMATURE GRANULOCYTES: 1 %
LYMPHOCYTES # BLD: 16 % (ref 24–43)
MAGNESIUM: 2.1 MG/DL (ref 1.6–2.6)
MCH RBC QN AUTO: 32.4 PG (ref 25.2–33.5)
MCHC RBC AUTO-ENTMCNC: 30.4 G/DL (ref 28.4–34.8)
MCV RBC AUTO: 106.5 FL (ref 82.6–102.9)
MODE: ABNORMAL
MONOCYTES # BLD: 15 % (ref 3–12)
MORPHOLOGY: ABNORMAL
MORPHOLOGY: ABNORMAL
NEGATIVE BASE EXCESS, ART: ABNORMAL (ref 0–2)
NRBC AUTOMATED: 0 PER 100 WBC
O2 DEVICE/FLOW/%: ABNORMAL
PATIENT TEMP: ABNORMAL
PDW BLD-RTO: 15.6 % (ref 11.8–14.4)
PLATELET # BLD: ABNORMAL K/UL (ref 138–453)
PLATELET ESTIMATE: ABNORMAL
PLATELET, FLUORESCENCE: 91 K/UL (ref 138–453)
PLATELET, IMMATURE FRACTION: 5.9 % (ref 1.1–10.3)
PMV BLD AUTO: ABNORMAL FL (ref 8.1–13.5)
POC CHLORIDE: 94 MMOL/L (ref 98–107)
POC CREATININE: 3.28 MG/DL (ref 0.51–1.19)
POC HCO3: 26.7 MMOL/L (ref 21–28)
POC HEMATOCRIT: 26 % (ref 41–53)
POC HEMOGLOBIN: 9 G/DL (ref 13.5–17.5)
POC IONIZED CALCIUM: 1.4 MMOL/L (ref 1.15–1.33)
POC LACTIC ACID: <0.3 MMOL/L (ref 0.56–1.39)
POC O2 SATURATION: 96 % (ref 94–98)
POC PCO2 TEMP: ABNORMAL MM HG
POC PCO2: 53.1 MM HG (ref 35–48)
POC PH TEMP: ABNORMAL
POC PH: 7.31 (ref 7.35–7.45)
POC PO2 TEMP: ABNORMAL MM HG
POC PO2: 89.2 MM HG (ref 83–108)
POC POTASSIUM: 3.1 MMOL/L (ref 3.5–4.5)
POC SODIUM: 126 MMOL/L (ref 138–146)
POSITIVE BASE EXCESS, ART: 0 (ref 0–3)
POTASSIUM SERPL-SCNC: 3.1 MMOL/L (ref 3.7–5.3)
RBC # BLD: 2.93 M/UL (ref 4.21–5.77)
RBC # BLD: ABNORMAL 10*6/UL
SAMPLE SITE: ABNORMAL
SEG NEUTROPHILS: 66 % (ref 36–65)
SEGMENTED NEUTROPHILS ABSOLUTE COUNT: 2.78 K/UL (ref 1.5–8.1)
SODIUM BLD-SCNC: 130 MMOL/L (ref 135–144)
TCO2 (CALC), ART: 28 MMOL/L (ref 22–29)
WBC # BLD: 4.2 K/UL (ref 3.5–11.3)
WBC # BLD: ABNORMAL 10*3/UL

## 2019-07-03 PROCEDURE — 99291 CRITICAL CARE FIRST HOUR: CPT | Performed by: INTERNAL MEDICINE

## 2019-07-03 PROCEDURE — 6360000002 HC RX W HCPCS: Performed by: STUDENT IN AN ORGANIZED HEALTH CARE EDUCATION/TRAINING PROGRAM

## 2019-07-03 PROCEDURE — 2700000000 HC OXYGEN THERAPY PER DAY

## 2019-07-03 PROCEDURE — 84295 ASSAY OF SERUM SODIUM: CPT

## 2019-07-03 PROCEDURE — 99232 SBSQ HOSP IP/OBS MODERATE 35: CPT | Performed by: INTERNAL MEDICINE

## 2019-07-03 PROCEDURE — 80048 BASIC METABOLIC PNL TOTAL CA: CPT

## 2019-07-03 PROCEDURE — 82330 ASSAY OF CALCIUM: CPT

## 2019-07-03 PROCEDURE — 82565 ASSAY OF CREATININE: CPT

## 2019-07-03 PROCEDURE — 6370000000 HC RX 637 (ALT 250 FOR IP): Performed by: STUDENT IN AN ORGANIZED HEALTH CARE EDUCATION/TRAINING PROGRAM

## 2019-07-03 PROCEDURE — 90935 HEMODIALYSIS ONE EVALUATION: CPT

## 2019-07-03 PROCEDURE — 6370000000 HC RX 637 (ALT 250 FOR IP): Performed by: PSYCHIATRY & NEUROLOGY

## 2019-07-03 PROCEDURE — 82435 ASSAY OF BLOOD CHLORIDE: CPT

## 2019-07-03 PROCEDURE — 85055 RETICULATED PLATELET ASSAY: CPT

## 2019-07-03 PROCEDURE — 2580000003 HC RX 258: Performed by: INTERNAL MEDICINE

## 2019-07-03 PROCEDURE — 84132 ASSAY OF SERUM POTASSIUM: CPT

## 2019-07-03 PROCEDURE — 6370000000 HC RX 637 (ALT 250 FOR IP): Performed by: INTERNAL MEDICINE

## 2019-07-03 PROCEDURE — 85025 COMPLETE CBC W/AUTO DIFF WBC: CPT

## 2019-07-03 PROCEDURE — 83605 ASSAY OF LACTIC ACID: CPT

## 2019-07-03 PROCEDURE — 94660 CPAP INITIATION&MGMT: CPT

## 2019-07-03 PROCEDURE — 94762 N-INVAS EAR/PLS OXIMTRY CONT: CPT

## 2019-07-03 PROCEDURE — 2580000003 HC RX 258: Performed by: STUDENT IN AN ORGANIZED HEALTH CARE EDUCATION/TRAINING PROGRAM

## 2019-07-03 PROCEDURE — 2000000000 HC ICU R&B

## 2019-07-03 PROCEDURE — 82803 BLOOD GASES ANY COMBINATION: CPT

## 2019-07-03 PROCEDURE — 82947 ASSAY GLUCOSE BLOOD QUANT: CPT

## 2019-07-03 PROCEDURE — 83735 ASSAY OF MAGNESIUM: CPT

## 2019-07-03 PROCEDURE — 85014 HEMATOCRIT: CPT

## 2019-07-03 RX ORDER — ALBUMIN (HUMAN) 12.5 G/50ML
25 SOLUTION INTRAVENOUS ONCE
Status: DISCONTINUED | OUTPATIENT
Start: 2019-07-03 | End: 2019-07-05

## 2019-07-03 RX ORDER — DIPHENHYDRAMINE HYDROCHLORIDE 50 MG/ML
50 INJECTION INTRAMUSCULAR; INTRAVENOUS ONCE
Status: COMPLETED | OUTPATIENT
Start: 2019-07-03 | End: 2019-07-03

## 2019-07-03 RX ADMIN — MIDODRINE HYDROCHLORIDE 10 MG: 5 TABLET ORAL at 12:44

## 2019-07-03 RX ADMIN — MIRTAZAPINE 45 MG: 30 TABLET, FILM COATED ORAL at 20:35

## 2019-07-03 RX ADMIN — FOLIC ACID 1 MG: 1 TABLET ORAL at 10:26

## 2019-07-03 RX ADMIN — ESCITALOPRAM OXALATE 10 MG: 10 TABLET ORAL at 10:26

## 2019-07-03 RX ADMIN — APIXABAN 2.5 MG: 2.5 TABLET, FILM COATED ORAL at 10:00

## 2019-07-03 RX ADMIN — SEVELAMER CARBONATE 800 MG: 800 TABLET, FILM COATED ORAL at 12:44

## 2019-07-03 RX ADMIN — Medication 10 ML: at 20:36

## 2019-07-03 RX ADMIN — Medication 1 CAPSULE: at 12:44

## 2019-07-03 RX ADMIN — SEVELAMER CARBONATE 800 MG: 800 TABLET, FILM COATED ORAL at 17:00

## 2019-07-03 RX ADMIN — Medication 10 ML: at 10:00

## 2019-07-03 RX ADMIN — ZIPRASIDONE HYDROCHLORIDE 40 MG: 40 CAPSULE ORAL at 17:00

## 2019-07-03 RX ADMIN — AMIODARONE HYDROCHLORIDE 200 MG: 200 TABLET ORAL at 10:00

## 2019-07-03 RX ADMIN — MIDODRINE HYDROCHLORIDE 10 MG: 5 TABLET ORAL at 17:00

## 2019-07-03 RX ADMIN — APIXABAN 2.5 MG: 2.5 TABLET, FILM COATED ORAL at 20:35

## 2019-07-03 RX ADMIN — SIMVASTATIN 10 MG: 10 TABLET, FILM COATED ORAL at 20:35

## 2019-07-03 RX ADMIN — DIPHENHYDRAMINE HYDROCHLORIDE 50 MG: 50 INJECTION, SOLUTION INTRAMUSCULAR; INTRAVENOUS at 01:18

## 2019-07-03 RX ADMIN — Medication 1 CAPSULE: at 17:00

## 2019-07-03 RX ADMIN — AMIODARONE HYDROCHLORIDE 200 MG: 200 TABLET ORAL at 20:35

## 2019-07-03 RX ADMIN — ZIPRASIDONE HYDROCHLORIDE 40 MG: 40 CAPSULE ORAL at 10:00

## 2019-07-03 RX ADMIN — INSULIN LISPRO 1 UNITS: 100 INJECTION, SOLUTION INTRAVENOUS; SUBCUTANEOUS at 20:40

## 2019-07-03 RX ADMIN — LEVETIRACETAM 500 MG: 500 TABLET, FILM COATED ORAL at 10:00

## 2019-07-03 ASSESSMENT — PULMONARY FUNCTION TESTS
PIF_VALUE: 15
PIF_VALUE: 14
PIF_VALUE: 14
PIF_VALUE: 15
PIF_VALUE: 12
PIF_VALUE: 14
PIF_VALUE: 14
PIF_VALUE: 15
PIF_VALUE: 14
PIF_VALUE: 15
PIF_VALUE: 15
PIF_VALUE: 12
PIF_VALUE: 15
PIF_VALUE: 16
PIF_VALUE: 14
PIF_VALUE: 13
PIF_VALUE: 15
PIF_VALUE: 14
PIF_VALUE: 12
PIF_VALUE: 15
PIF_VALUE: 14
PIF_VALUE: 14
PIF_VALUE: 12
PIF_VALUE: 3
PIF_VALUE: 14
PIF_VALUE: 15

## 2019-07-03 ASSESSMENT — PAIN SCALES - GENERAL
PAINLEVEL_OUTOF10: 0

## 2019-07-03 NOTE — PROGRESS NOTES
Grandfather         Allergies:   Patient has no known allergies.      Review of Systems:   UTO pt somnolent on bipap    Physical Examination :     Patient Vitals for the past 8 hrs:   BP Temp Temp src Pulse Resp SpO2 Weight   07/03/19 1330 (!) 108/44 -- -- 77 24 100 % --   07/03/19 1315 (!) 115/44 -- -- 87 22 99 % --   07/03/19 1300 (!) 110/46 -- -- 80 19 99 % --   07/03/19 1245 (!) 92/37 -- -- 79 19 99 % --   07/03/19 1230 (!) 115/55 -- -- 81 19 100 % --   07/03/19 1215 (!) 101/41 -- -- 78 18 99 % --   07/03/19 1212 (!) 103/43 -- -- 78 17 -- 161 lb 2.5 oz (73.1 kg)   07/03/19 1200 95/74 97 °F (36.1 °C) Axillary 76 21 100 % --   07/03/19 1158 (!) 93/39 -- -- 75 21 100 % --   07/03/19 1148 (!) 88/57 -- -- 80 -- -- --   07/03/19 1145 (!) 88/57 -- -- 78 21 100 % --   07/03/19 1140 (!) 96/41 -- -- 77 -- -- --   07/03/19 1130 (!) 96/41 -- -- 80 24 100 % --   07/03/19 1115 (!) 107/49 -- -- 86 23 100 % --   07/03/19 1110 (!) 109/47 -- -- 86 -- -- --   07/03/19 1100 (!) 109/47 -- -- 87 18 100 % --   07/03/19 1045 (!) 124/51 -- -- 94 22 100 % --   07/03/19 1040 (!) 116/54 -- -- 93 -- -- --   07/03/19 1030 117/82 -- -- 86 19 100 % --   07/03/19 1015 (!) 111/50 -- -- 78 18 100 % --   07/03/19 1010 (!) 110/47 -- -- 76 -- -- --   07/03/19 1000 (!) 110/47 -- -- 81 18 100 % --   07/03/19 0956 -- -- -- -- 19 100 % --   07/03/19 0945 (!) 109/47 -- -- 76 17 100 % --   07/03/19 0940 (!) 109/47 -- -- 83 -- -- --   07/03/19 0930 (!) 116/48 -- -- 82 18 100 % --   07/03/19 0915 (!) 127/51 -- -- 85 17 100 % --   07/03/19 0910 (!) 127/51 -- -- 83 -- -- --   07/03/19 0900 (!) 113/47 -- -- 78 18 100 % --   07/03/19 0845 (!) 120/49 -- -- 80 19 100 % --   07/03/19 0840 (!) 120/49 -- -- 78 -- -- --   07/03/19 0830 (!) 116/47 -- -- 83 17 100 % --   07/03/19 0821 (!) 118/50 97.5 °F (36.4 °C) -- 78 23 -- 165 lb 12.6 oz (75.2 kg)   07/03/19 0815 (!) 111/51 -- -- 85 20 100 % --   07/03/19 0811 -- -- -- -- 20 100 % --   07/03/19 0809 -- -- -- 84 18 100 Ref Range: Not Detected  Not Detected   Coronavirus OC Latest Ref Range: Not Detected  Not Detected   Human Metapneumovirus PCR Latest Ref Range: Not Detected  Not Detected   Influenza A by PCR Latest Ref Range: Not Detected  Not Detected   Influenza A H1 (2009) PCR Latest Ref Range: Not Detected  NOT REPORTED   Influenza A H1 PCR Latest Ref Range: Not Detected  NOT REPORTED   Influenza A H3 PCR Latest Ref Range: Not Detected  NOT REPORTED   Influenza B by PCR Latest Ref Range: Not Detected  Not Detected   Parainfluenza 1 PCR Latest Ref Range: Not Detected  Not Detected   Parainfluenza 2 PCR Latest Ref Range: Not Detected  Not Detected   Parainfluenza 3 PCR Latest Ref Range: Not Detected  Not Detected   Parainfluenza 4 PCR Latest Ref Range: Not Detected  Not Detected   Resp Syncytial Virus PCR Latest Ref Range: Not Detected  Not Detected   Rhino/Enterovirus PCR Latest Ref Range: Not Detected  Not Detected   Mycoplasma pneumo by PCR Latest Ref Range: Not Detected  Not Detected     Medical Decision Making-Other:     Note: Daily decision making includes:  Review of labs, medications, personal examination of radiologic studies  Review of Infection Control Prevention measures. Review of Antibiotic Stewardship data, when applicable. Discussion with nursing, wound care and discharge planning Staff, as applicable  Review of discharge planning  Determination of need for outpatient follow up  Determination of prognosis: Guarded  Review of medication administration as ordered. Review of Large amounts of data      Thank you for allowing us to participate in the care of this patient. Please call with questions.     Baltazar Gilford, MD  Pager: [phone] - Office: [phone]

## 2019-07-03 NOTE — PLAN OF CARE
Ongoing  Goal: Decrease in sensory misperception frequency  Description  Decrease in sensory misperception frequency  7/2/2019 2109 by Veronika Nathan RN  Outcome: Ongoing  7/2/2019 1450 by Toi Hadley RN  Outcome: Ongoing  Goal: Able to refrain from responding to false sensory perceptions  Description  Able to refrain from responding to false sensory perceptions  7/2/2019 2109 by Veronika Nathan RN  Outcome: Ongoing  7/2/2019 1450 by Toi Hadley RN  Outcome: Ongoing  Goal: Demonstrates accurate environmental perceptions  Description  Demonstrates accurate environmental perceptions  7/2/2019 2109 by Veronika Nathan RN  Outcome: Ongoing  7/2/2019 1450 by Toi Hadley RN  Outcome: Ongoing  Goal: Able to distinguish between reality-based and nonreality-based thinking  Description  Able to distinguish between reality-based and nonreality-based thinking  7/2/2019 2109 by Veronika Nathan RN  Outcome: Ongoing  7/2/2019 1450 by Toi Hadley RN  Outcome: Ongoing  Goal: Able to interrupt nonreality-based thinking  Description  Able to interrupt nonreality-based thinking  7/2/2019 2109 by Veronika Nathan RN  Outcome: Ongoing  7/2/2019 1450 by Toi Hadley RN  Outcome: Ongoing     Problem: Sleep Pattern Disturbance:  Goal: Appears well-rested  Description  Appears well-rested  7/2/2019 2109 by Veronika Nathan RN  Outcome: Ongoing  7/2/2019 1450 by Toi Hadley RN  Outcome: Ongoing     Problem: Nutrition  Goal: Optimal nutrition therapy  7/2/2019 2109 by Veronika Nathan RN  Outcome: Ongoing  7/2/2019 1450 by Toi Hadley RN  Outcome: Ongoing     Problem: Gas Exchange - Impaired:  Goal: Levels of oxygenation will improve  Description  Levels of oxygenation will improve  7/2/2019 2109 by Veronika Nathan RN  Outcome: Ongoing  7/2/2019 1450 by Toi Hadley RN  Outcome: Ongoing     Problem: Airway Clearance - Ineffective:  Goal: Ability to maintain a clear airway will improve  Description  Ability to maintain a clear airway will improve  7/2/2019 2109 by Riddhi Mcdaniels RN  Outcome: Ongoing  7/2/2019 1450 by Jarett Holt RN  Outcome: Ongoing     Problem: Anxiety/Stress:  Goal: Level of anxiety will decrease  Description  Level of anxiety will decrease  7/2/2019 2109 by Riddhi Mcdaniels RN  Outcome: Ongoing  7/2/2019 1450 by Jarett Holt RN  Outcome: Ongoing     Problem: Aspiration:  Goal: Absence of aspiration  Description  Absence of aspiration  7/2/2019 2109 by Riddhi Mcdaniels RN  Outcome: Ongoing  7/2/2019 1450 by Jarett Holt RN  Outcome: Ongoing     Problem: Cardiac Output - Decreased:  Goal: Hemodynamic stability will improve  Description  Hemodynamic stability will improve  7/2/2019 2109 by Riddhi Mcdaniels RN  Outcome: Ongoing  7/2/2019 1450 by Jarett Holt RN  Outcome: Ongoing     Problem: Nutrition Deficit:  Goal: Ability to achieve adequate nutritional intake will improve  Description  Ability to achieve adequate nutritional intake will improve  7/2/2019 2109 by Riddhi Mcdaniels RN  Outcome: Ongoing  7/2/2019 1450 by Jarett Holt RN  Outcome: Ongoing     Problem: Serum Glucose Level - Abnormal:  Goal: Ability to maintain appropriate glucose levels will improve to within specified parameters  Description  Ability to maintain appropriate glucose levels will improve to within specified parameters  7/2/2019 2109 by Riddhi Mcdaniels RN  Outcome: Ongoing  7/2/2019 1450 by Jarett Holt RN  Outcome: Ongoing     Problem: Skin Integrity - Impaired:  Goal: Will show no infection signs and symptoms  Description  Will show no infection signs and symptoms  7/2/2019 2109 by Riddhi Mcdaniels RN  Outcome: Ongoing  7/2/2019 1450 by Jarett Holt RN  Outcome: Ongoing  Goal: Absence of new skin breakdown  Description  Absence of new skin breakdown  7/2/2019 2109 by Riddhi Mcdaniels RN  Outcome: Ongoing  7/2/2019 1450 by Jarett Holt

## 2019-07-03 NOTE — PROGRESS NOTES
3. 96 4.22 4.47   46 - 49 2.40 2.57 2.76 2.94 3.14 3.33 3.54 3.75 46 - 49 2.70 2.92 3.14 3.37 3.61 3.85 4.10 4.36   50 - 53 2.31 2.48 2.66 2.85 3.04 3.24 3.45 3.66 50 - 53 2.58 2.80 3.02 3.25 3.49 3.73 3.98 4.24   54 - 57 2.21 2.38 2.57 2.75 2.95 3.14 3.35 3.56 54 - 57 2.46 2.67 2.89 3.12 3.36 3.60 3.85 4.11   58 - 61 2.10 2.28 2.46 2.65 2.84 3.04 3.24 3.45 58 - 61 2.32 2.54 2.76 2.99 3.23 3.47 3.72 3.98   62 - 65 1.99 2.17 2.35 2.54 2.73 2.93 3.13 3.34 62 - 65 2.19 2.40 2.62 2.85 3.09 3.33 3.58 3.84   66 - 69 1.88 2.05 2.23 2.42 2.61 2.81 3.02 3.23 66 - 69 2.04 2.26 2.48 2.71 2.95 3.19 3.44 3.70   70+ 1.82 1.99 2.17 2.36 2.55 2.75 2.95 3.16 70+ 1.97 2.19 2.41 2.64 2.87 3.12 3.37 3.62             Predicted Peak Expiratory Flow Rate                                       Height (in)  Female       Height (in) Male           Age 64 63 56 61 58 73 78 74 Age            21 344 357 372 387 402 417 432 446  60 62 64 66 68 70 72 74 76   25 337 352 366 381 396 411 426 441 25 447 476 [phone] [phone]   30 329 344 359 374 389 404 419 434 30 437 466 494 523 552 [phone]   35 322 337 351 366 381 396 411 426 35 426 455 484 512 541 [phone] 657   40 314 329 344 359 374 389 404 419 40 416 445 473 502 531 559 588 617 647   45 307 322 336 351 366 381 396 411 45 405 434 463 491 520 549 577 606 636   50 299 314 329 344 359 374 389 404 50 395 424 452 481 510 538 567 596 625   55 292 307 321 336 351 366 381 396 55 384 413 442 470 499 528 556 585 615   60 284 299 314 329 344 359 374 389 60 374 403 431 460 489 517 546 575 605   65 277 292 306 321 336 351 366 381 65 363 392 421 449 478 507 535 564 594   70 269 284 299 314 329 344 359 374 70 353 382 410 439 468 496 525 554 583   75 261 274 289 305 319 334 348 364 75 344 372 400 429 458 487 515 544 573   80 253 266 282 296 312 327 342 356 80 335 362 390 419 448 476 [phone]           [x]   PATIENT EDUCATION AS NEEDED

## 2019-07-03 NOTE — PROGRESS NOTES
Critical Care Team - Daily Progress Note      Date and time: 7/3/2019 11:31 AM  Patient's name:  John Blake  Medical Record Number: [id number]  Patient's account/billing number: [de-identified]  Patient's YOB: 1950  Age: 77 y.o. Date of Admission: 6/21/2019  8:24 PM  Length of stay during current admission: 11      Primary Care Physician: Rachel Pettit PA-C  ICU Attending Physician: Dr. Allen Quevedo     Code Status: DNR-CCA    Reason for ICU admission:   Chief Complaint   Patient presents with    Shortness of Breath    Atrial Fibrillation     new onset         SUBJECTIVE:     OVERNIGHT EVENTS:         Patient seen and evaluated at bedside. No acute overnight events. On NIV this morning. Denies any shortness of breath or chest pain. Awake alert and oriented following commands.   Remains on levofed    AWAKE & FOLLOWING COMMANDS:  [] No   [x] Yes    CURRENT VENTILATION STATUS:     [] Ventilator  [x] BIPAP  [] Nasal Cannula [] Room Air      IF INTUBATED, ET TUBE MARKING AT LOWER LIP:       cms    SECRETIONS Amount:  [] Small [] Moderate  [] Large  [x] None  Color:     [] White [] Colored  [] Bloody    SEDATION:  RAAS Score:  [] Propofol gtt  [] Versed gtt  [] Ativan gtt   [x] No Sedation    PARALYZED:  [x] No    [] Yes    DIARRHEA:                [x] No                [] Yes  (C. Difficile status: [] positive                                                                                                                       [] negative                                                                                                                     [] pending)    VASOPRESSORS:  [] No    [x] Yes    If yes -   [x] Levophed       [] Dopamine     [] Vasopressin       [] Dobutamine  [] Phenylephrine         [] Epinephrine    CENTRAL LINES:     [] No   [x] Yes   (Date of Insertion:   )           If yes -     [] Right IJ     [] Left IJ [] Right Femoral [] Left Femoral                   [] Right []  Positive (Details:  )  Urine Culture:                   [] None drawn      [] Negative             []  Positive (Details:  )  Sputum Culture:               [] None drawn       [] Negative             []  Positive (Details:  )   Endotracheal aspirate:     [] None drawn       [] Negative             []  Positive (Details:  )             Chest Xray (7/3/2019):    ASSESSMENT:     Principal Problem:    Atrial fibrillation ([address]  Active Problems: Thrombocytopenia (HCC)    Generalized weakness    Schizoaffective disorder ([address]    ESRD on hemodialysis ([address]    Seizure disorder ([address]    Wheelchair dependent    Pneumonia due to organism    Anemia of chronic kidney failure    History of renal cell carcinoma    Elevated troponin    Pleural effusion, right    Acute respiratory failure with hypoxia and hypercapnia (HCC)  Resolved Problems:    * No resolved hospital problems. *          PLAN:     New onset of A. fib  Echo showing EF of 55%  Amiodarone 200 mg BID PO   Eliquis 2.5 bid renal dosing    Acute respiratory failure  likely multifactorial 2/2 combo of bilateral pleural effusion and right pleural effusion with possible pneumonia or possible chronic neuromuscular issues contributing to hypercapnia  BiPAP at night  Check venous doppler to rule out DVT as a cause of pulmonary HTN     Bilateral pleural effusion R>L  Possible pneumonia. Completed course of antibiotics on 6/6/6229      Metabolic encephalopathy   Geodon 40 mg BID holding due to somnolence   Keppra 500 mg qd for PMHx seizures and during dialysis  Lexapro 10 mg qd  Ativan prn for agitation   Remeron 45 mg nightly   TSH within normal limits. cortisol level 8.3   LFT and Ammonia level within normal limits      ESRD on hemodialysis  Hemodialysis MWF   Nephrology following   Hemodialysis today    Type 2 diabetes with nephropathy  Controlled with insulin sliding scale    Anemia of chronic disease  Stable.  Cont to monitor     Thrombocytopenia-improving  Platelets 91  Continue to monitor any signs of bleed    Hypotension  Currently on levo 2 mcg. Midodrine 10 mg TID for hypotension     Prognosis guarded  CODE STATUS DNR CCA no intubation   POA is to make decision on Friday discussing with his family    Devante Bain M.D.              Department of Internal Medicine/ Critical care  [facility])             7/3/2019, 11:31 AM

## 2019-07-03 NOTE — PROGRESS NOTES
NEPHROLOGY PROGRESS NOTE      SUBJECTIVE     Hospitalized with progressive weakness and altered mental sensorium following hemodialysis. Assessment showed evidence of atrial fibrillation with bilateral effusions and suspected underlying pneumonia. Hospital course complicated by further worsening respiratory status and hypotension needing noninvasive ventilation and pressor support. Scheduled for dialysis today. Continues to be lethargic. Continues to be in hypercapnic respiratory failure. Using noninvasive ventilation. He is DNR CC arrest.    OBJECTIVE     Vitals:    07/03/19 0100 07/03/19 0200 07/03/19 0300 07/03/19 0400   BP: 122/61 (!) 118/54 (!) 124/50 110/70   Pulse: 94 86 83 85   Resp: 22 17 19 17   Temp:    98.4 °F (36.9 °C)   TempSrc:    Axillary   SpO2:   100% 100%   Weight:       Height:         24HR INTAKE/OUTPUT:      Intake/Output Summary (Last 24 hours) at 7/3/2019 0804  Last data filed at 7/3/2019 0400  Gross per 24 hour   Intake 334.36 ml   Output --   Net 334.36 ml       General appearance: Noninvasive ventilation  Respiratory::vesicular breath sounds harsh reduced air entry  Cardiovascular:S1 S2 normal,no gallop or organic murmur. Abdomen:Non tender/non distended. Bowel sounds present  Extremities: No Cyanosis or Clubbing,Lower extremity edema        MEDICATIONS     Scheduled Meds:    darbepoetin marisol-polysorbate  40 mcg Intravenous Weekly    doxercalciferol  3.5 mcg Intravenous Q MWF    midodrine  10 mg Oral TID WC    apixaban  2.5 mg Oral BID    lactobacillus  1 capsule Oral TID WC    amiodarone  200 mg Oral BID    levETIRAcetam  500 mg Oral Daily    sodium chloride flush  10 mL Intravenous 2 times per day    escitalopram  10 mg Oral Daily    folic acid  1 mg Oral Daily    mirtazapine  45 mg Oral Nightly    pantoprazole  40 mg Oral QAM AC    sevelamer  800 mg Oral TID WC    simvastatin  10 mg Oral Nightly    ziprasidone  40 mg Oral BID WC    sodium chloride flush  10 mL OF MAGNESIA) 400 MG/5ML suspension, Take 30 mLs by mouth daily as needed for Constipation  midodrine (PROAMATINE) 10 MG tablet, Take 1 tablet by mouth as needed (for systolic blood pressure less than 100)  nicotine (NICODERM CQ) 21 MG/24HR, Place 1 patch onto the skin daily as needed (if patient smokes/requests nicotine replacent therapy)    INVESTIGATIONS     Last 3 CMP:    Recent Labs     07/01/19 0548 07/02/19 0415 07/03/19  0452   * 130*  --    K 3.6* 3.4*  --    CL 92* 95*  --    CO2 23 26  --    BUN 44* 26*  --    CREATININE 3.56* 2.51* 3.28*   CALCIUM 9.4 9.3  --    PROT  --  5.7*  --    LABALBU 3.6 3.5  --    BILITOT  --  0.52  --    ALKPHOS  --  98  --    AST  --  12  --    ALT  --  7  --        Last 3 CBC:  Recent Labs     07/01/19 0548 07/02/19 0415 07/03/19  0737   WBC 5.0 3.3* 4.2   RBC 3.00* 2.63* 2.93*   HGB 9.7* 8.7* 9.5*   HCT 31.9* 27.9* 31.2*   .3* 106.1* 106.5*   MCH 32.3 33.1 32.4   MCHC 30.4 31.2 30.4   RDW 15.5* 15.4* 15.6*   PLT See Reflexed IPF Result 68* See Reflexed IPF Result   MPV NOT REPORTED 11.7 NOT REPORTED       ASSESSMENT     1. ESRD on intermittent maintenance with dialysis Monday Wednesday Friday using AV fistula  2. Decompensated cor pulmonale  3. Moderate to severe tricuspid regurgitation/severe pulmonary hypertension with RVSP 73  3. Atrial fibrillation with rapid ventricular rate currently on amiodarone  4. Hypotension off pressors  5. Hypercapnic respiratory failure - NIV  6.  AMS  7. DM2  8. Renal cell carcinoma status post embolization    PLAN     1. Hemodialysis/ultrafiltration today will lower dialysate temperature and use midodrine along with albumin to achieve UF.  2 to 3 kg off.  2.  Continue midodrine to 10 mg 3 times a day  3.  UF am also. 6 kg above DW  4.   Prognosis guarded    Please do not hesitate to call with questions    This note is created with the assistance of a speech-recognition program. While intending to generate a document that

## 2019-07-04 LAB
ABSOLUTE EOS #: 0.04 K/UL (ref 0–0.4)
ABSOLUTE IMMATURE GRANULOCYTE: 0 K/UL (ref 0–0.3)
ABSOLUTE LYMPH #: 0.43 K/UL (ref 1–4.8)
ABSOLUTE MONO #: 0.22 K/UL (ref 0.1–0.8)
ANION GAP SERPL CALCULATED.3IONS-SCNC: 9 MMOL/L (ref 9–17)
BASOPHILS # BLD: 0 % (ref 0–2)
BASOPHILS ABSOLUTE: 0 K/UL (ref 0–0.2)
BUN BLDV-MCNC: 24 MG/DL (ref 8–23)
BUN/CREAT BLD: ABNORMAL (ref 9–20)
CALCIUM SERPL-MCNC: 9.4 MG/DL (ref 8.6–10.4)
CHLORIDE BLD-SCNC: 95 MMOL/L (ref 98–107)
CO2: 26 MMOL/L (ref 20–31)
CREAT SERPL-MCNC: 2.48 MG/DL (ref 0.7–1.2)
DIFFERENTIAL TYPE: ABNORMAL
EOSINOPHILS RELATIVE PERCENT: 1 % (ref 1–4)
GFR AFRICAN AMERICAN: 31 ML/MIN
GFR NON-AFRICAN AMERICAN: 26 ML/MIN
GFR SERPL CREATININE-BSD FRML MDRD: ABNORMAL ML/MIN/{1.73_M2}
GFR SERPL CREATININE-BSD FRML MDRD: ABNORMAL ML/MIN/{1.73_M2}
GLUCOSE BLD-MCNC: 135 MG/DL (ref 75–110)
GLUCOSE BLD-MCNC: 141 MG/DL (ref 70–99)
GLUCOSE BLD-MCNC: 144 MG/DL (ref 75–110)
GLUCOSE BLD-MCNC: 150 MG/DL (ref 75–110)
GLUCOSE BLD-MCNC: 96 MG/DL (ref 75–110)
HCT VFR BLD CALC: 29.1 % (ref 40.7–50.3)
HEMOGLOBIN: 9 G/DL (ref 13–17)
IMMATURE GRANULOCYTES: 0 %
LYMPHOCYTES # BLD: 10 % (ref 24–44)
MAGNESIUM: 1.8 MG/DL (ref 1.6–2.6)
MCH RBC QN AUTO: 33.1 PG (ref 25.2–33.5)
MCHC RBC AUTO-ENTMCNC: 30.9 G/DL (ref 28.4–34.8)
MCV RBC AUTO: 107 FL (ref 82.6–102.9)
MONOCYTES # BLD: 5 % (ref 1–7)
MORPHOLOGY: ABNORMAL
MORPHOLOGY: ABNORMAL
NRBC AUTOMATED: 0 PER 100 WBC
PDW BLD-RTO: 15.5 % (ref 11.8–14.4)
PLATELET # BLD: ABNORMAL K/UL (ref 138–453)
PLATELET ESTIMATE: ABNORMAL
PLATELET, FLUORESCENCE: 106 K/UL (ref 138–453)
PLATELET, IMMATURE FRACTION: 4.5 % (ref 1.1–10.3)
PMV BLD AUTO: ABNORMAL FL (ref 8.1–13.5)
POTASSIUM SERPL-SCNC: 3 MMOL/L (ref 3.7–5.3)
POTASSIUM SERPL-SCNC: 4.3 MMOL/L (ref 3.7–5.3)
RBC # BLD: 2.72 M/UL (ref 4.21–5.77)
RBC # BLD: ABNORMAL 10*6/UL
SEG NEUTROPHILS: 84 % (ref 36–66)
SEGMENTED NEUTROPHILS ABSOLUTE COUNT: 3.61 K/UL (ref 1.8–7.7)
SODIUM BLD-SCNC: 130 MMOL/L (ref 135–144)
WBC # BLD: 4.3 K/UL (ref 3.5–11.3)
WBC # BLD: ABNORMAL 10*3/UL

## 2019-07-04 PROCEDURE — 6370000000 HC RX 637 (ALT 250 FOR IP): Performed by: STUDENT IN AN ORGANIZED HEALTH CARE EDUCATION/TRAINING PROGRAM

## 2019-07-04 PROCEDURE — 94761 N-INVAS EAR/PLS OXIMETRY MLT: CPT

## 2019-07-04 PROCEDURE — 2700000000 HC OXYGEN THERAPY PER DAY

## 2019-07-04 PROCEDURE — 84132 ASSAY OF SERUM POTASSIUM: CPT

## 2019-07-04 PROCEDURE — 99232 SBSQ HOSP IP/OBS MODERATE 35: CPT | Performed by: INTERNAL MEDICINE

## 2019-07-04 PROCEDURE — 94660 CPAP INITIATION&MGMT: CPT

## 2019-07-04 PROCEDURE — 2580000003 HC RX 258: Performed by: STUDENT IN AN ORGANIZED HEALTH CARE EDUCATION/TRAINING PROGRAM

## 2019-07-04 PROCEDURE — 85055 RETICULATED PLATELET ASSAY: CPT

## 2019-07-04 PROCEDURE — 2000000000 HC ICU R&B

## 2019-07-04 PROCEDURE — 6360000002 HC RX W HCPCS: Performed by: INTERNAL MEDICINE

## 2019-07-04 PROCEDURE — 83735 ASSAY OF MAGNESIUM: CPT

## 2019-07-04 PROCEDURE — 82947 ASSAY GLUCOSE BLOOD QUANT: CPT

## 2019-07-04 PROCEDURE — 6370000000 HC RX 637 (ALT 250 FOR IP): Performed by: INTERNAL MEDICINE

## 2019-07-04 PROCEDURE — 2580000003 HC RX 258: Performed by: INTERNAL MEDICINE

## 2019-07-04 PROCEDURE — 6370000000 HC RX 637 (ALT 250 FOR IP): Performed by: PSYCHIATRY & NEUROLOGY

## 2019-07-04 PROCEDURE — 2500000003 HC RX 250 WO HCPCS: Performed by: STUDENT IN AN ORGANIZED HEALTH CARE EDUCATION/TRAINING PROGRAM

## 2019-07-04 PROCEDURE — 99291 CRITICAL CARE FIRST HOUR: CPT | Performed by: INTERNAL MEDICINE

## 2019-07-04 PROCEDURE — 85025 COMPLETE CBC W/AUTO DIFF WBC: CPT

## 2019-07-04 PROCEDURE — 80048 BASIC METABOLIC PNL TOTAL CA: CPT

## 2019-07-04 RX ORDER — SODIUM CHLORIDE 9 MG/ML
INJECTION, SOLUTION INTRAVENOUS CONTINUOUS
Status: DISCONTINUED | OUTPATIENT
Start: 2019-07-04 | End: 2019-07-05

## 2019-07-04 RX ORDER — POTASSIUM CHLORIDE 7.45 MG/ML
10 INJECTION INTRAVENOUS
Status: COMPLETED | OUTPATIENT
Start: 2019-07-04 | End: 2019-07-04

## 2019-07-04 RX ORDER — MINERAL OIL AND WHITE PETROLATUM 150; 830 MG/G; MG/G
OINTMENT OPHTHALMIC
Status: DISCONTINUED | OUTPATIENT
Start: 2019-07-04 | End: 2019-07-05

## 2019-07-04 RX ADMIN — AMIODARONE HYDROCHLORIDE 200 MG: 200 TABLET ORAL at 08:17

## 2019-07-04 RX ADMIN — POTASSIUM CHLORIDE 10 MEQ: 7.46 INJECTION, SOLUTION INTRAVENOUS at 12:03

## 2019-07-04 RX ADMIN — Medication 10 ML: at 20:25

## 2019-07-04 RX ADMIN — INSULIN LISPRO 2 UNITS: 100 INJECTION, SOLUTION INTRAVENOUS; SUBCUTANEOUS at 08:18

## 2019-07-04 RX ADMIN — MIDODRINE HYDROCHLORIDE 10 MG: 5 TABLET ORAL at 12:09

## 2019-07-04 RX ADMIN — FOLIC ACID 1 MG: 1 TABLET ORAL at 08:16

## 2019-07-04 RX ADMIN — ZIPRASIDONE HYDROCHLORIDE 40 MG: 40 CAPSULE ORAL at 17:31

## 2019-07-04 RX ADMIN — SODIUM CHLORIDE: 9 INJECTION, SOLUTION INTRAVENOUS at 06:51

## 2019-07-04 RX ADMIN — INSULIN LISPRO 2 UNITS: 100 INJECTION, SOLUTION INTRAVENOUS; SUBCUTANEOUS at 17:04

## 2019-07-04 RX ADMIN — Medication 10 ML: at 08:29

## 2019-07-04 RX ADMIN — MIRTAZAPINE 45 MG: 30 TABLET, FILM COATED ORAL at 20:24

## 2019-07-04 RX ADMIN — POTASSIUM CHLORIDE 10 MEQ: 7.46 INJECTION, SOLUTION INTRAVENOUS at 09:50

## 2019-07-04 RX ADMIN — LEVETIRACETAM 500 MG: 500 TABLET, FILM COATED ORAL at 08:30

## 2019-07-04 RX ADMIN — ACETAMINOPHEN 500 MG: 500 TABLET ORAL at 08:22

## 2019-07-04 RX ADMIN — AMIODARONE HYDROCHLORIDE 200 MG: 200 TABLET ORAL at 20:23

## 2019-07-04 RX ADMIN — POTASSIUM CHLORIDE 10 MEQ: 7.46 INJECTION, SOLUTION INTRAVENOUS at 13:14

## 2019-07-04 RX ADMIN — MIDODRINE HYDROCHLORIDE 10 MG: 5 TABLET ORAL at 08:16

## 2019-07-04 RX ADMIN — SEVELAMER CARBONATE 800 MG: 800 TABLET, FILM COATED ORAL at 12:09

## 2019-07-04 RX ADMIN — MIDODRINE HYDROCHLORIDE 10 MG: 5 TABLET ORAL at 20:23

## 2019-07-04 RX ADMIN — APIXABAN 2.5 MG: 2.5 TABLET, FILM COATED ORAL at 08:30

## 2019-07-04 RX ADMIN — Medication 2 MCG/MIN: at 06:50

## 2019-07-04 RX ADMIN — ACETAMINOPHEN 500 MG: 500 TABLET ORAL at 02:54

## 2019-07-04 RX ADMIN — SEVELAMER CARBONATE 800 MG: 800 TABLET, FILM COATED ORAL at 08:15

## 2019-07-04 RX ADMIN — SEVELAMER CARBONATE 800 MG: 800 TABLET, FILM COATED ORAL at 20:25

## 2019-07-04 RX ADMIN — APIXABAN 2.5 MG: 2.5 TABLET, FILM COATED ORAL at 20:24

## 2019-07-04 RX ADMIN — MINERAL OIL AND WHITE PETROLATUM: 150; 830 OINTMENT OPHTHALMIC at 01:43

## 2019-07-04 RX ADMIN — PANTOPRAZOLE SODIUM 40 MG: 40 TABLET, DELAYED RELEASE ORAL at 08:16

## 2019-07-04 RX ADMIN — ZIPRASIDONE HYDROCHLORIDE 40 MG: 40 CAPSULE ORAL at 08:15

## 2019-07-04 RX ADMIN — SIMVASTATIN 10 MG: 10 TABLET, FILM COATED ORAL at 20:23

## 2019-07-04 RX ADMIN — ESCITALOPRAM OXALATE 10 MG: 10 TABLET ORAL at 08:16

## 2019-07-04 RX ADMIN — POTASSIUM CHLORIDE 10 MEQ: 7.46 INJECTION, SOLUTION INTRAVENOUS at 10:51

## 2019-07-04 ASSESSMENT — PULMONARY FUNCTION TESTS
PIF_VALUE: 13
PIF_VALUE: 14
PIF_VALUE: 13
PIF_VALUE: 14

## 2019-07-04 ASSESSMENT — PAIN DESCRIPTION - PAIN TYPE: TYPE: CHRONIC PAIN

## 2019-07-04 ASSESSMENT — PAIN SCALES - GENERAL
PAINLEVEL_OUTOF10: 8
PAINLEVEL_OUTOF10: 8

## 2019-07-04 ASSESSMENT — PAIN DESCRIPTION - DESCRIPTORS: DESCRIPTORS: ACHING

## 2019-07-04 ASSESSMENT — PAIN DESCRIPTION - ONSET: ONSET: ON-GOING

## 2019-07-04 ASSESSMENT — PAIN DESCRIPTION - LOCATION: LOCATION: BACK

## 2019-07-04 ASSESSMENT — PAIN DESCRIPTION - ORIENTATION: ORIENTATION: LOWER

## 2019-07-04 ASSESSMENT — PAIN DESCRIPTION - FREQUENCY: FREQUENCY: CONTINUOUS

## 2019-07-04 NOTE — PROGRESS NOTES
10 mg Oral Nightly    ziprasidone  40 mg Oral BID WC    sodium chloride flush  10 mL Intravenous 2 times per day    insulin lispro  0-12 Units Subcutaneous TID WC    insulin lispro  0-6 Units Subcutaneous Nightly       Social History:     Social History     Socioeconomic History    Marital status: Single     Spouse name: Not on file    Number of children: Not on file    Years of education: Not on file    Highest education level: Not on file   Occupational History    Not on file   Social Needs    Financial resource strain: Not on file    Food insecurity:     Worry: Not on file     Inability: Not on file    Transportation needs:     Medical: Not on file     Non-medical: Not on file   Tobacco Use    Smoking status: Never Smoker    Smokeless tobacco: Never Used   Substance and Sexual Activity    Alcohol use: No    Drug use: No    Sexual activity: Not on file   Lifestyle    Physical activity:     Days per week: Not on file     Minutes per session: Not on file    Stress: Not on file   Relationships    Social connections:     Talks on phone: Not on file     Gets together: Not on file     Attends Rastafarian service: Not on file     Active member of club or organization: Not on file     Attends meetings of clubs or organizations: Not on file     Relationship status: Not on file    Intimate partner violence:     Fear of current or ex partner: Not on file     Emotionally abused: Not on file     Physically abused: Not on file     Forced sexual activity: Not on file   Other Topics Concern    Not on file   Social History Narrative    ** Merged History Encounter **            Family History:     Family History   Problem Relation Age of Onset    Diabetes Mother     Stroke Mother     Coronary Art Dis Mother     Diabetes Father     No Known Problems Maternal Grandmother     No Known Problems Maternal Grandfather     No Known Problems Paternal Grandmother     No Known Problems Paternal Grandfather PROVIDED HISTORY:       Ordering Physician Provided Reason for Exam: Nata Hernandez is a 77 y.o.   gentleman with significant past medical history of end-stage renal disease on   dialysis, Monday Wednesday Friday, dry weight 160 Lbs, history of   hypertension, history of renal cell carcinoma status post embolization,   asthma presented with generalized weakness starting approximately 2 days. Per patient he has been feeling weak after dialysis and had not recovered   since.       FINDINGS:   BRAIN/VENTRICLES: There is no acute intracranial hemorrhage, mass effect or   midline shift.  No abnormal extra-axial fluid collection.  Lucencies within   the periventricular and subcortical white matter likely represent chronic   microvascular ischemic changes.   The gray-white differentiation is   maintained without evidence of an acute infarct.  There is no evidence of   hydrocephalus.       ORBITS: The visualized portion of the orbits demonstrate no acute abnormality.       SINUSES: Stable opacification right-sided mastoid air cells.       SOFT TISSUES/SKULL:  Postoperative changes from left parietal craniotomy.           Impression   No acute intracranial abnormality.       Chronic microvascular ischemic changes.       Stable right mastoid effusion. EXAMINATION:   ONE XRAY VIEW OF THE CHEST       6/24/2019 6:10 am       COMPARISON:   06/21/2019       HISTORY:   ORDERING SYSTEM PROVIDED HISTORY: AMS   TECHNOLOGIST PROVIDED HISTORY:   AMS       FINDINGS:   Large right pleural effusion with possible underlying consolidation/mass and   small left pleural effusion both showing mild interval increase.  Findings   obscured right heart border.  Aortic knob calcifications unchanged.  No   mediastinal widening.  Vascular congestion.  No pneumothorax.  Bones grossly   intact.           Impression   Large right pleural effusion.  This may obscure underlying infiltrate/mass.    Small left pleural effusion.  Both show some

## 2019-07-04 NOTE — PLAN OF CARE
Problem: Sensory Perception - Impaired:  Goal: Able to distinguish between reality-based and nonreality-based thinking  Description  Able to distinguish between reality-based and nonreality-based thinking  7/4/2019 1645 by Althea Fong RN  Outcome: Ongoing     Problem: Sensory Perception - Impaired:  Goal: Able to interrupt nonreality-based thinking  Description  Able to interrupt nonreality-based thinking  7/4/2019 1645 by Althea Fong RN  Outcome: Ongoing     Problem: Sleep Pattern Disturbance:  Goal: Appears well-rested  Description  Appears well-rested  7/4/2019 1645 by Althea Fong RN  Outcome: Ongoing     Problem: Gas Exchange - Impaired:  Goal: Levels of oxygenation will improve  Description  Levels of oxygenation will improve  7/4/2019 1645 by Althea Fong RN  Outcome: Ongoing     Problem: Airway Clearance - Ineffective:  Goal: Ability to maintain a clear airway will improve  Description  Ability to maintain a clear airway will improve  7/4/2019 1645 by Althea Fong RN  Outcome: Ongoing     Problem: Anxiety/Stress:  Goal: Level of anxiety will decrease  Description  Level of anxiety will decrease  7/4/2019 1645 by Althea Fong RN  Outcome: Ongoing     Problem: Aspiration:  Goal: Absence of aspiration  Description  Absence of aspiration  7/4/2019 1645 by Althea Fong RN  Outcome: Ongoing     Problem: Cardiac Output - Decreased:  Goal: Hemodynamic stability will improve  Description  Hemodynamic stability will improve  7/4/2019 1645 by Althea Fong RN  Outcome: Ongoing     Problem: Nutrition Deficit:  Goal: Ability to achieve adequate nutritional intake will improve  Description  Ability to achieve adequate nutritional intake will improve  7/4/2019 1645 by Althea Fong RN  Outcome: Ongoing     Problem: Serum Glucose Level - Abnormal:  Goal: Ability to maintain appropriate glucose levels will improve to within specified parameters  Description  Ability to maintain appropriate glucose levels will improve to within specified parameters  7/4/2019 1645 by Caesar Velazquez RN  Outcome: Ongoing     Problem: Skin Integrity - Impaired:  Goal: Will show no infection signs and symptoms  Description  Will show no infection signs and symptoms  7/4/2019 1645 by Caesar Velazquez RN  Outcome: Ongoing     Problem: Skin Integrity - Impaired:  Goal: Absence of new skin breakdown  Description  Absence of new skin breakdown  7/4/2019 1645 by Caesar Velazquez RN  Outcome: Ongoing     Problem: Tissue Perfusion, Altered:  Goal: Circulatory function within specified parameters  Description  Circulatory function within specified parameters  7/4/2019 1645 by Caesar Velazquez RN  Outcome: Ongoing     Problem: Tissue Perfusion - Cardiopulmonary, Altered:  Goal: Absence of angina  Description  Absence of angina  7/4/2019 1645 by Caesar Velazquez RN  Outcome: Ongoing     Problem: Tissue Perfusion - Cardiopulmonary, Altered:  Goal: Hemodynamic stability will improve  Description  Hemodynamic stability will improve  7/4/2019 1645 by Caesar Velazquez RN  Outcome: Ongoing     Problem: Pain:  Goal: Pain level will decrease  Description  Pain level will decrease  Outcome: Ongoing     Problem: Pain:  Goal: Control of acute pain  Description  Control of acute pain  Outcome: Met This Shift     Problem: Pain:  Goal: Control of chronic pain  Description  Control of chronic pain  Outcome: Ongoing

## 2019-07-04 NOTE — PROGRESS NOTES
BRONCHOSPASM/BRONCHOCONSTRICTION     [x]         IMPROVE AERATION/BREATH SOUNDS  [x]   ADMINISTER BRONCHODILATOR THERAPY AS APPROPRIATE  [x]   ASSESS BREATH SOUNDS  [x]   IMPLEMENT AEROSOL/MDI PROTOCOL  NON INVASIVE VENTILATION  PROVIDE OPTIMAL VENTILATION/ACCEPTABLE SP02  IMPLEMENT NON INVASIVE VENTILATION PROTOCOL  ASSESSMENT SKIN INTEGRITY  PATIENT EDUCATION AS NEEDED  BIPAP AS NEEDEDPROVIDE ADEQUATE OXYGENATION WITH ACCEPTABLE SP02/ABG'S    [x]  IDENTIFY APPROPRIATE OXYGEN THERAPY  [x]   MONITOR SP02/ABG'S AS NEEDED   [x]   PATIENT EDUCATION AS NEEDED    [x]   PATIENT EDUCATION AS NEEDED

## 2019-07-04 NOTE — PROGRESS NOTES
Oral Daily    folic acid  1 mg Oral Daily    mirtazapine  45 mg Oral Nightly    pantoprazole  40 mg Oral QAM AC    sevelamer  800 mg Oral TID WC    simvastatin  10 mg Oral Nightly    ziprasidone  40 mg Oral BID WC    sodium chloride flush  10 mL Intravenous 2 times per day    insulin lispro  0-12 Units Subcutaneous TID WC    insulin lispro  0-6 Units Subcutaneous Nightly     Continuous Infusions:    sodium chloride 10 mL/hr at 07/04/19 0651    norepinephrine 2 mcg/min (07/04/19 0650)    dextrose       PRN Meds:  lubrifresh P.M., midodrine, sodium chloride flush, albuterol, sodium chloride flush, magnesium hydroxide, ondansetron, senna, acetaminophen, albuterol, glucose, dextrose, glucagon (rDNA), dextrose  Home Meds:                Medications Prior to Admission: acetaminophen (SM PAIN RELIEVER) 325 MG tablet, Take 2 tablets by mouth every 6 hours as needed for Pain  ziprasidone (GEODON) 40 MG capsule, Take 1 capsule by mouth 2 times daily (with meals)  folic acid (FOLVITE) 1 MG tablet, Take 1 tablet by mouth daily  levETIRAcetam (KEPPRA) 500 MG tablet, Take 1 tablet by mouth 2 times daily  Lancets MISC, TEST BLOOD SUGAR TWICE DAILY  simvastatin (ZOCOR) 10 MG tablet, Take 1 tablet by mouth nightly  metoprolol succinate (TOPROL XL) 50 MG extended release tablet, Take 1 tablet by mouth nightly  escitalopram (LEXAPRO) 10 MG tablet, Take 1 tablet by mouth daily  lisinopril (PRINIVIL;ZESTRIL) 10 MG tablet, Take 1 tablet by mouth daily  clopidogrel (PLAVIX) 75 MG tablet, Take 1 tablet by mouth daily  amLODIPine (NORVASC) 5 MG tablet, Take 1 tablet by mouth daily  omeprazole (PRILOSEC) 20 MG delayed release capsule, Take 1 capsule by mouth Daily  sevelamer (RENVELA) 800 MG tablet,   mirtazapine (REMERON) 45 MG tablet, Take 1 tablet by mouth nightly  blood glucose test strips (TRUE METRIX BLOOD GLUCOSE TEST) strip, 1 each by In Vitro route 3 times daily As needed.   ondansetron (ZOFRAN) 4 MG tablet, Take 1 tablet

## 2019-07-04 NOTE — PLAN OF CARE
Problem: Falls - Risk of:  Goal: Will remain free from falls  Description  Will remain free from falls  Outcome: Ongoing     Problem: Risk for Impaired Skin Integrity  Goal: Tissue integrity - skin and mucous membranes  Description  Structural intactness and normal physiological function of skin and  mucous membranes. Outcome: Ongoing     Maintain patient safety and skin integrity with frequent monitoring, repositioning, and maintaining clean and dry skin. Continue to monitor patient.   Nini Chanel RN

## 2019-07-05 VITALS
WEIGHT: 164.46 LBS | SYSTOLIC BLOOD PRESSURE: 126 MMHG | TEMPERATURE: 98.8 F | HEART RATE: 79 BPM | BODY MASS INDEX: 30.26 KG/M2 | RESPIRATION RATE: 18 BRPM | DIASTOLIC BLOOD PRESSURE: 60 MMHG | OXYGEN SATURATION: 100 % | HEIGHT: 62 IN

## 2019-07-05 LAB — GLUCOSE BLD-MCNC: 93 MG/DL (ref 75–110)

## 2019-07-05 PROCEDURE — 6370000000 HC RX 637 (ALT 250 FOR IP): Performed by: PSYCHIATRY & NEUROLOGY

## 2019-07-05 PROCEDURE — 99232 SBSQ HOSP IP/OBS MODERATE 35: CPT | Performed by: INTERNAL MEDICINE

## 2019-07-05 PROCEDURE — 99291 CRITICAL CARE FIRST HOUR: CPT | Performed by: INTERNAL MEDICINE

## 2019-07-05 PROCEDURE — 6370000000 HC RX 637 (ALT 250 FOR IP): Performed by: STUDENT IN AN ORGANIZED HEALTH CARE EDUCATION/TRAINING PROGRAM

## 2019-07-05 PROCEDURE — 2700000000 HC OXYGEN THERAPY PER DAY

## 2019-07-05 PROCEDURE — 2580000003 HC RX 258: Performed by: INTERNAL MEDICINE

## 2019-07-05 PROCEDURE — 94762 N-INVAS EAR/PLS OXIMTRY CONT: CPT

## 2019-07-05 PROCEDURE — 6370000000 HC RX 637 (ALT 250 FOR IP): Performed by: INTERNAL MEDICINE

## 2019-07-05 PROCEDURE — 2580000003 HC RX 258: Performed by: STUDENT IN AN ORGANIZED HEALTH CARE EDUCATION/TRAINING PROGRAM

## 2019-07-05 PROCEDURE — 94660 CPAP INITIATION&MGMT: CPT

## 2019-07-05 PROCEDURE — 82947 ASSAY GLUCOSE BLOOD QUANT: CPT

## 2019-07-05 RX ORDER — MORPHINE SULFATE 4 MG/ML
4 INJECTION, SOLUTION INTRAMUSCULAR; INTRAVENOUS
Status: DISCONTINUED | OUTPATIENT
Start: 2019-07-05 | End: 2019-07-05 | Stop reason: HOSPADM

## 2019-07-05 RX ORDER — ONDANSETRON 2 MG/ML
4 INJECTION INTRAMUSCULAR; INTRAVENOUS EVERY 6 HOURS PRN
Status: DISCONTINUED | OUTPATIENT
Start: 2019-07-05 | End: 2019-07-05 | Stop reason: HOSPADM

## 2019-07-05 RX ORDER — LORAZEPAM 2 MG/ML
1 INJECTION INTRAMUSCULAR
Status: DISCONTINUED | OUTPATIENT
Start: 2019-07-05 | End: 2019-07-05 | Stop reason: HOSPADM

## 2019-07-05 RX ORDER — LOPERAMIDE HYDROCHLORIDE 2 MG/1
2 CAPSULE ORAL PRN
Status: DISCONTINUED | OUTPATIENT
Start: 2019-07-05 | End: 2019-07-05 | Stop reason: HOSPADM

## 2019-07-05 RX ORDER — MORPHINE SULFATE 2 MG/ML
2 INJECTION, SOLUTION INTRAMUSCULAR; INTRAVENOUS
Status: DISCONTINUED | OUTPATIENT
Start: 2019-07-05 | End: 2019-07-05 | Stop reason: HOSPADM

## 2019-07-05 RX ORDER — GLYCOPYRROLATE 0.2 MG/ML
0.2 INJECTION INTRAMUSCULAR; INTRAVENOUS EVERY 4 HOURS PRN
Status: DISCONTINUED | OUTPATIENT
Start: 2019-07-05 | End: 2019-07-05 | Stop reason: HOSPADM

## 2019-07-05 RX ADMIN — Medication 10 ML: at 08:56

## 2019-07-05 RX ADMIN — SEVELAMER CARBONATE 800 MG: 800 TABLET, FILM COATED ORAL at 08:55

## 2019-07-05 RX ADMIN — APIXABAN 2.5 MG: 2.5 TABLET, FILM COATED ORAL at 08:56

## 2019-07-05 RX ADMIN — MIDODRINE HYDROCHLORIDE 10 MG: 5 TABLET ORAL at 08:55

## 2019-07-05 RX ADMIN — AMIODARONE HYDROCHLORIDE 200 MG: 200 TABLET ORAL at 08:56

## 2019-07-05 RX ADMIN — ZIPRASIDONE HYDROCHLORIDE 40 MG: 40 CAPSULE ORAL at 08:55

## 2019-07-05 RX ADMIN — PANTOPRAZOLE SODIUM 40 MG: 40 TABLET, DELAYED RELEASE ORAL at 08:55

## 2019-07-05 RX ADMIN — LEVETIRACETAM 500 MG: 500 TABLET, FILM COATED ORAL at 08:56

## 2019-07-05 RX ADMIN — FOLIC ACID 1 MG: 1 TABLET ORAL at 08:55

## 2019-07-05 RX ADMIN — ESCITALOPRAM OXALATE 10 MG: 10 TABLET ORAL at 08:55

## 2019-07-05 ASSESSMENT — PULMONARY FUNCTION TESTS
PIF_VALUE: 19
PIF_VALUE: 15
PIF_VALUE: 14
PIF_VALUE: 15

## 2019-07-05 NOTE — PROGRESS NOTES
[facility] - Progress Note  Today's Date and Time: 7/5/2019, 9:47 AM    Impression :   · Fluid retention  · Bilateral pleural effusions Rt>Lt  · Compressive atelectasis  · Possible pneumonia. Difficult to exclude in face of pleural effusions  · New onset A fib  · CHF  · Schizoaffective disorder  · Prior CVA with residual Lt side weakness  · ESRD on HD  · Prior Renal cell Ca  · Thrombocytopenia    Recommendations:   · Will respect family's decision for Hospice Care  · ID will sign off at this stage. Medical Decision Making/Summary/Discussion:7/5/2019     · Patient with multiple medical problems  · Was feeling poorly last week and had to stop HD early   · Presented with malaise, weakness, palpitations. · Found to have new onset A fib, fluid retention (7 Kg over dry weight), bilateral pleural effusions. · ID asked to see if he has pneumonia. This is difficult to exclude because of the pleural effusions and the Hx of cough with some sputum production. · Will simplify treatment to Cefepime adjusted for renal failure  · Re-assess as fluid is removed with HD  · 6-27 Pt with additional HD today, goal is removal of 1-2 KG. · 6-30-19 s/p HD very tired. On BIPAP  · 7-1-19: patient remains in respiratory distress. Hypotensive, on HD, unable to remove fluid. Infection Control Recommendations   · [place] Precautions    Antimicrobial Stewardship Recommendations     · Simplification of therapy  · PK dosing    Coordination of Outpatient Care:   · Estimated Length of IV antimicrobials: TBD  · Patient will need Midline Catheter Insertion:No   · Patient will need PICC line Insertion:No  · Patient will need: Home IV , [place],  SNF,  LTAC:TBD  · Patient will need outpatient wound care:No    Chief complaint/reason for consultation:   · Pneumonia      History of Present Illness:   Bryan Whitley is a 77y.o.-year-old  male who was initially admitted on 6/21/2019.  Patient basilar infiltrate. bilateral pleural effusions    Medical Decision Fknrnh-Mpwksagw-Lqksr:     Results for Yulia White (MRN [id number]) as of 6/26/2019 13:21   Ref. Range 6/25/2019 13:27   Adenovirus PCR Latest Ref Range: Not Detected  Not Detected   B Pertussis by PCR Latest Ref Range: Not Detected  Not Detected   Chlamydia pneumoniae By PCR Latest Ref Range: Not Detected  Not Detected   Coronavirus 229E PCR Latest Ref Range: Not Detected  Not Detected   Coronavirus HKU1 PCR Latest Ref Range: Not Detected  Not Detected   Coronavirus NL63 PCR Latest Ref Range: Not Detected  Not Detected   Coronavirus OC Latest Ref Range: Not Detected  Not Detected   Human Metapneumovirus PCR Latest Ref Range: Not Detected  Not Detected   Influenza A by PCR Latest Ref Range: Not Detected  Not Detected   Influenza A H1 (2009) PCR Latest Ref Range: Not Detected  NOT REPORTED   Influenza A H1 PCR Latest Ref Range: Not Detected  NOT REPORTED   Influenza A H3 PCR Latest Ref Range: Not Detected  NOT REPORTED   Influenza B by PCR Latest Ref Range: Not Detected  Not Detected   Parainfluenza 1 PCR Latest Ref Range: Not Detected  Not Detected   Parainfluenza 2 PCR Latest Ref Range: Not Detected  Not Detected   Parainfluenza 3 PCR Latest Ref Range: Not Detected  Not Detected   Parainfluenza 4 PCR Latest Ref Range: Not Detected  Not Detected   Resp Syncytial Virus PCR Latest Ref Range: Not Detected  Not Detected   Rhino/Enterovirus PCR Latest Ref Range: Not Detected  Not Detected   Mycoplasma pneumo by PCR Latest Ref Range: Not Detected  Not Detected     Medical Decision Making-Other:     Note: Daily decision making includes:  Review of labs, medications, personal examination of radiologic studies  Review of Infection Control Prevention measures. Review of Antibiotic Stewardship data, when applicable.   Discussion with nursing, wound care and discharge planning Staff, as applicable  Review of discharge planning  Determination of need for

## 2019-07-05 NOTE — PROGRESS NOTES
Right IJ     [] Left IJ [] Right Femoral [] Left Femoral                   [] Right Subclavian [] Left Subclavian       TIRADO'S CATHETER:   [] No   [] Yes  (Date of Insertion:   )     URINE OUTPUT:            [] Good   [] Low              [] Anuric      OBJECTIVE:     VITAL SIGNS:  BP (!) 126/54   Pulse 82   Temp 98.8 °F (37.1 °C) (Axillary)   Resp 20   Ht 5' 2\" (1.575 m)   Wt 164 lb 7.4 oz (74.6 kg)   SpO2 100%   BMI 30.08 kg/m²   Tmax over 24 hours:  Temp (24hrs), Av.8 °F (37.1 °C), Min:97.9 °F (36.6 °C), Max:100.9 °F (38.3 °C)      Patient Vitals for the past 6 hrs:   BP Temp Temp src Pulse Resp SpO2   19 0745 -- -- -- 82 20 100 %   19 0730 (!) 126/54 98.8 °F (37.1 °C) Axillary 85 18 100 %   19 0715 (!) 118/48 -- -- 90 16 100 %   19 0700 107/67 -- -- 89 20 100 %   19 0645 (!) 119/42 -- -- 83 21 100 %   19 0630 (!) 109/40 -- -- 81 20 100 %   19 0615 (!) 107/40 -- -- 76 21 100 %   19 0600 (!) 114/51 -- -- 84 21 100 %   19 0545 (!) 122/49 -- -- 84 26 100 %   19 0530 (!) 125/45 -- -- 79 25 100 %   19 0515 (!) 126/55 -- -- 84 25 100 %   19 0500 (!) 126/54 -- -- 82 20 100 %   19 0445 (!) 114/51 -- -- 77 22 100 %   19 0430 (!) 117/49 -- -- 75 20 100 %   19 0415 (!) 113/45 -- -- 78 20 100 %   19 0400 (!) 116/50 -- -- 79 22 100 %   19 0345 (!) 128/51 -- -- 83 28 100 %   19 0330 123/69 -- -- 88 (!) 34 100 %   19 0315 (!) 137/59 -- -- 87 23 100 %   19 0300 135/68 -- -- 85 21 100 %   19 0245 (!) 115/47 -- -- 78 -- 100 %   19 0230 (!) 120/50 -- -- 75 20 100 %   19 0215 (!) 129/53 -- -- 83 20 100 %   19 0200 (!) 128/47 -- -- 82 20 100 %         Intake/Output Summary (Last 24 hours) at 2019 0746  Last data filed at 2019 0630  Gross per 24 hour   Intake 1662.17 ml   Output --   Net 1662.17 ml     Wt Readings from Last 2 Encounters:   19 164 lb 7.4 oz (74.6 kg) (rDNA) 1 mg PRN   dextrose 100 mL/hr PRN         VENT SETTINGS (Comprehensive) (if applicable):  Vent Information  $Ventilation: $Subsequent Day  Skin Assessment: Clean, dry, & intact  Vent Type: Servo i  Vent Mode: NIV/PC  Pressure Ordered: 10  Rate Set: 20 bmp  Pressure Support: 12 cmH20  FiO2 : 30 %  PEEP/CPAP: 5  I Time/ I Time %: 0.8 s  Cuff Pressure (cm H2O): 19 cm H2O  Humidification Source: Heated wire  Humidification Temp: 33  Humidification Temp Measured: 33  Additional Respiratory  Assessments  Pulse: 82  Resp: 20  SpO2: 100 %  Position: Semi-Perez's  Humidification Source: Heated wire  Humidification Temp: 33  Oral Care Completed?: Yes  Oral Care: Mouth moisturizer, Mouth suctioned  Cuff Pressure (cm H2O): 19 cm H2O  Skin barrier applied: Yes    ABGs:     Laboratory findings:    Complete Blood Count: Recent Labs     07/03/19  0737 07/04/19 0448   WBC 4.2 4.3   HGB 9.5* 9.0*   HCT 31.2* 29.1*   PLT See Reflexed IPF Result See Reflexed IPF Result        Last 3 Blood Glucose:   Recent Labs     07/03/19  0737 07/04/19  0448   GLUCOSE 99 141*        PT/INR:    Lab Results   Component Value Date    PROTIME 14.9 05/14/2019    PROTIME 10.4 05/22/2012    INR 1.2 05/14/2019     PTT:    Lab Results   Component Value Date    APTT 46.3 06/27/2019       Comprehensive Metabolic Profile:   Recent Labs     07/03/19  0452 07/03/19 0737 07/04/19 0448 07/04/19  1610   NA  --  130* 130*  --    K  --  3.1* 3.0* 4.3   CL  --  92* 95*  --    CO2  --  22 26  --    BUN  --  36* 24*  --    CREATININE 3.28* 3.19* 2.48*  --    GLUCOSE  --  99 141*  --    CALCIUM  --  10.2 9.4  --       Magnesium:   Lab Results   Component Value Date    MG 1.8 07/04/2019     Phosphorus:   Lab Results   Component Value Date    PHOS 3.7 07/01/2019     Ionized Calcium:   Lab Results   Component Value Date    CAION 1.16 06/24/2019        Urinalysis:     Troponin: No results for input(s): TROPONINI in the last 72 hours.     Microbiology:    Cultures Cont to monitor      Thrombocytopenia-improving  Platelets 616  Continue to monitor any signs of bleed     Hypotension  Currently on levo 2 mcg. Midodrine 10 mg TID for hypotension      Prognosis guarded    Code status changed to [facility] by Vivian De, patient's POA. Hospice to evaluate patient this morning. Comfort care orders initiated.       Anjum Castro MD  Critical Care Resident, PGY-2  Department of Internal Medicine/ Critical care  [facility])             7/5/2019, 7:46 AM

## 2019-07-05 NOTE — PLAN OF CARE
Problem: Confusion - Acute:  Goal: Absence of continued neurological deterioration signs and symptoms  Description  Absence of continued neurological deterioration signs and symptoms  7/5/2019 0246 by Mari Kocher, RN  Outcome: Ongoing  7/4/2019 1645 by Sandra Nieves RN  Outcome: Ongoing     Problem: Airway Clearance - Ineffective:  Goal: Ability to maintain a clear airway will improve  Description  Ability to maintain a clear airway will improve  7/5/2019 0246 by Mari Kocher, RN  Outcome: Ongoing  7/4/2019 1645 by Sandra Nieves RN  Outcome: Ongoing     Problem: Anxiety/Stress:  Goal: Level of anxiety will decrease  Description  Level of anxiety will decrease  7/5/2019 0246 by Mari Kocher, RN  Outcome: Ongoing  7/4/2019 1645 by Sandra Nieves RN  Outcome: Ongoing     Monitor patient for AMS reorient as needed. Maintain effective airway. Monitor breathing and oxygenation as needed. Promote rest and relaxation to minimize stress and anxiety.   Continue to monitor patient  Mari Kocher, RN

## 2019-07-05 NOTE — DISCHARGE INSTR - COC
Continuity of Care Form    Patient Name: Long Whitfield   :  1950  MRN:  [id number]    Admit date:  2019  Discharge date:  ***    Code Status Order: [facility]   Advance Directives:   Advance Care Flowsheet Documentation     Date/Time Healthcare Directive Type of Healthcare Directive Copy in [address] Agent's Name Healthcare Agent's Phone Number    19 [phone]  Yes, patient has an advance directive for healthcare treatment  --  --  --  --  --    19 0253  Yes, patient has an advance directive for healthcare treatment  Durable power of  for health care  No, copy requested from family  Adult [address]  [phone]          Admitting Physician:  Alejandro Mckeon MD  PCP: Karol Morillo PA-C    Discharging Nurse: [facility] Unit/Room#: 0106/0106-01  Discharging Unit Phone Number: ***    Emergency Contact:   Extended Emergency Contact Information  Primary Emergency Contact: Kiana Pollack  Address: [address] [place] [address] Phone: [phone]  Relation: Child  Hearing or visual needs: None  Other needs: None  Preferred language: English   needed?  No  Secondary Emergency Contact: [address] Phone: [phone]  Relation: Grandchild    Past Surgical History:  Past Surgical History:   Procedure Laterality Date    APPENDECTOMY      CRANIOTOMY      brain bleed    DIALYSIS FISTULA CREATION Left x 2    LUE    ENDOSCOPY, COLON, DIAGNOSTIC      EYE SURGERY      laser    EYE SURGERY         Immunization History:   Immunization History   Administered Date(s) Administered    Hepatitis B Adult Dialysis/Immunosup (Recombivax HB) 2001, 2001, 2001, 2004, 2005    Influenza Vaccine, unspecified formulation 2015    Influenza Virus Vaccine 10/03/2016    Influenza, Quadv, 3 yrs and older, IM, PF (Fluzone 3 yrs and older or Afluria 5 (74.6 kg)     Mental Status:  {IP PT MENTAL STATUS:89245}    IV Access:  { MICHELLE IV ACCESS:807867633}    Nursing Mobility/ADLs:  Walking   {CHP DME UGIY:413172783}  Transfer  {CHP DME PXHC:564373758}  Bathing  {CHP DME PVWN:351209822}  Dressing  {CHP DME QVHF:467041608}  Toileting  {CHP DME QSJL:978735032}  Feeding  {CHP DME LSAW:727826357}  Med Admin  {CHP DME QQVF:320992400}  Med Delivery   { MICHELLE MED Delivery:294232959}    Wound Care Documentation and Therapy:        Elimination:  Continence:   · Bowel: {YES / SR:01271}  · Bladder: {YES / UN:28903}  Urinary Catheter: {Urinary Catheter:310625568}   Colostomy/Ileostomy/Ileal Conduit: {YES / TB:93183}       Date of Last BM: ***    Intake/Output Summary (Last 24 hours) at 2019 0948  Last data filed at 2019 0932  Gross per 24 hour   Intake 1346.07 ml   Output --   Net 1346.07 ml     I/O last 3 completed shifts:   In: 1662.2 [P.O.:957; I.V.:705.2]  Out: -     Safety Concerns:     [address] Safety Concerns:287787049}    Impairments/Disabilities:      [address] Impairments/Disabilities:785656755}    Nutrition Therapy:  Current Nutrition Therapy:   [address] Diet List:810615924}    Routes of Feeding: {CHP DME Other Feedings:320467815}  Liquids: {Slp liquid thickness:24776}  Daily Fluid Restriction: {CHP DME Yes amt example:897074424}  Last Modified Barium Swallow with Video (Video Swallowing Test): {Done Not Done DADB:322338008}    Treatments at the Time of Hospital Discharge:   Respiratory Treatments: ***  Oxygen Therapy:  {Therapy; copd oxygen:84839}  Ventilator:    {[facility] Vent ARPN:540819683}    Rehab Therapies: {THERAPEUTIC INTERVENTION:5662499619}  Weight Bearing Status/Restrictions: [address]  Weight Bearin}  Other Medical Equipment (for information only, NOT a DME order):  {EQUIPMENT:424500467}  Other Treatments: ***    Patient's personal belongings (please select all that are sent with patient):  {CHP DME Belongings:722912144}    RN SIGNATURE:

## 2019-07-05 NOTE — DISCHARGE SUMMARY
[facility]     Department of Internal Medicine - Critical Care Service    INPATIENT DISCHARGE SUMMARY      PATIENT IDENTIFICATION:  NAME:  Afshan Ashton   :   1950  MRN:    [id number]     Acct:    [de-identified]   Admit Date:  2019  Discharge date:  No discharge date for patient encounter. Attending Provider: Latisha Rodriguez MD                                     Principal Problem:    Atrial fibrillation [facility])  Active Problems: Thrombocytopenia (HCC)    Generalized weakness    Schizoaffective disorder ([facility]    ESRD on hemodialysis ([facility]    Seizure disorder ([facility]    Wheelchair dependent    Pneumonia due to organism    Anemia of chronic kidney failure    History of renal cell carcinoma    Elevated troponin    Pleural effusion, right    Acute respiratory failure with hypoxia and hypercapnia (HCC)  Resolved Problems:    * No resolved hospital problems. *       REASON FOR HOSPITALIZATION:   Chief Complaint   Patient presents with    Shortness of Breath    Atrial Fibrillation     new onset          Hospital Course    Mr. Harlee Hamman is a 67yo male with PMHx ESRD, HTN, renal cell carcinoma, and asthma who initially presented to the hospital on  with generalized weakness for one weak. In the Emergency Department he was found to have new onset atrial fibrillation as well as pneumonia. He was admitted to Internal Medicine and initiated on anticoagulation and antibiotics. Cardiology was consulted for new onset atrial fibrillation and Infectious Disease was consulted for pneumonia. Nephrology was consulted for ongoing management of ESRD and patient received dialysis throughout his admission. Patient was drowsy and ill-appearing throughout his admission. Given the patient's comorbidities and overall poor function and mobility, Palliative Care was consulted to discuss goals of care.  Palliative Care met with the patient and his daughter Mague Glaser, who is his POA, and patient's code

## 2019-07-05 NOTE — PLAN OF CARE
Yaya Sun RN  Outcome: Completed     Problem: Mood - Altered:  Goal: Mood stable  Description  Mood stable  7/5/2019 1246 by Yaya Sun RN  Outcome: Completed     Problem: Mood - Altered:  Goal: Absence of abusive behavior  Description  Absence of abusive behavior  7/5/2019 1246 by Yaya Sun RN  Outcome: Completed     Problem: Mood - Altered:  Goal: Verbalizations of feeling emotionally comfortable while being cared for will increase  Description  Verbalizations of feeling emotionally comfortable while being cared for will increase  7/5/2019 1246 by Yaya Sun RN  Outcome: Completed     Problem: Psychomotor Activity - Altered:  Goal: Absence of psychomotor disturbance signs and symptoms  Description  Absence of psychomotor disturbance signs and symptoms  7/5/2019 1246 by Yaya Sun RN  Outcome: Completed     Problem: Sensory Perception - Impaired:  Goal: Demonstrations of improved sensory functioning will increase  Description  Demonstrations of improved sensory functioning will increase  7/5/2019 1246 by Yaya Sun RN  Outcome: Completed     Problem: Sensory Perception - Impaired:  Goal: Decrease in sensory misperception frequency  Description  Decrease in sensory misperception frequency  7/5/2019 1246 by Yaya Sun RN  Outcome: Completed     Problem: Sensory Perception - Impaired:  Goal: Able to refrain from responding to false sensory perceptions  Description  Able to refrain from responding to false sensory perceptions  7/5/2019 1246 by Yaya Sun RN  Outcome: Completed     Problem: Sensory Perception - Impaired:  Goal: Able to distinguish between reality-based and nonreality-based thinking  Description  Able to distinguish between reality-based and nonreality-based thinking  7/5/2019 1246 by Yaya Sun RN  Outcome: Completed     Problem: Sensory Perception - Impaired:  Goal: Able to interrupt nonreality-based thinking  Description  Able to interrupt nonreality-based thinking  7/5/2019 1246 by Yasmeen Wagoner RN  Outcome: Completed     Problem: Sleep Pattern Disturbance:  Goal: Appears well-rested  Description  Appears well-rested  7/5/2019 1246 by Yasmeen Wagoner RN  Outcome: Completed     Problem: Nutrition  Goal: Optimal nutrition therapy  7/5/2019 1246 by Yasmeen Wagoner RN  Outcome: Completed     Problem: Gas Exchange - Impaired:  Goal: Levels of oxygenation will improve  Description  Levels of oxygenation will improve  7/5/2019 1246 by Yasmeen Wagoner RN  Outcome: Completed     Problem: Airway Clearance - Ineffective:  Goal: Ability to maintain a clear airway will improve  Description  Ability to maintain a clear airway will improve  7/5/2019 1246 by Yasmeen Wagoner RN  Outcome: Completed     Problem: Anxiety/Stress:  Goal: Level of anxiety will decrease  Description  Level of anxiety will decrease  7/5/2019 1246 by Yasmeen Wagoner RN  Outcome: Completed     Problem: Aspiration:  Goal: Absence of aspiration  Description  Absence of aspiration  7/5/2019 1246 by Yasmeen Wagoner RN  Outcome: Completed     Problem: Cardiac Output - Decreased:  Goal: Hemodynamic stability will improve  Description  Hemodynamic stability will improve  7/5/2019 1246 by Yasmeen Wagoner RN  Outcome: Completed     Problem: Nutrition Deficit:  Goal: Ability to achieve adequate nutritional intake will improve  Description  Ability to achieve adequate nutritional intake will improve  7/5/2019 1246 by Yasmeen Wagoner RN  Outcome: Completed     Problem: Serum Glucose Level - Abnormal:  Goal: Ability to maintain appropriate glucose levels will improve to within specified parameters  Description  Ability to maintain appropriate glucose levels will improve to within specified parameters  7/5/2019 1246 by Yasmeen Wagoner RN  Outcome: Completed     Problem: Skin Integrity - Impaired:  Goal: Will show no infection signs and symptoms  Description  Will show no infection signs and symptoms  7/5/2019 1246 by Geetha Melgoza RN  Outcome: Completed     Problem: Skin Integrity - Impaired:  Goal: Absence of new skin breakdown  Description  Absence of new skin breakdown  7/5/2019 1246 by Geetha Melgoza RN  Outcome: Completed     Problem: Tissue Perfusion, Altered:  Goal: Circulatory function within specified parameters  Description  Circulatory function within specified parameters  7/5/2019 1246 by Geetha Melgoza RN  Outcome: Completed     Problem: Tissue Perfusion - Cardiopulmonary, Altered:  Goal: Absence of angina  Description  Absence of angina  7/5/2019 1246 by Geetha Melgoza RN  Outcome: Completed     Problem: Tissue Perfusion - Cardiopulmonary, Altered:  Goal: Hemodynamic stability will improve  Description  Hemodynamic stability will improve  7/5/2019 1246 by Geetha Melgoza RN  Outcome: Completed     Problem: Pain:  Goal: Pain level will decrease  Description  Pain level will decrease  7/5/2019 1246 by Geetha Melgoza RN  Outcome: Completed     Problem: Pain:  Goal: Control of acute pain  Description  Control of acute pain  7/5/2019 1246 by Geetha Melgoza RN  Outcome: Completed     Problem: Pain:  Goal: Control of chronic pain  Description  Control of chronic pain  7/5/2019 1246 by Geetha Melgoza RN  Outcome: Completed

## 2021-08-02 NOTE — TELEPHONE ENCOUNTER
Health Maintenance   Topic Date Due    A1C test (Diabetic or Prediabetic)  05/24/2019    Hepatitis B Vaccine  Completed    Flu vaccine  Completed    Pneumococcal 65+ years Vaccine  Completed    HPV vaccine  Aged Out             (applicable per patient's age: Cancer Screenings, Depression Screening, Fall Risk Screening, Immunizations)    Hemoglobin A1C (%)   Date Value   05/24/2018 4.4   02/14/2017 5.0   11/08/2016 4.9     LDL Cholesterol (mg/dL)   Date Value   05/24/2018 31     LDL Calculated (mg/dL)   Date Value   02/14/2017 37     AST (U/L)   Date Value   05/24/2018 30     ALT (U/L)   Date Value   05/24/2018 26     BUN (mg/dL)   Date Value   05/24/2018 61 (H)      (goal A1C is < 7)   (goal LDL is <100) need 30-50% reduction from baseline     BP Readings from Last 3 Encounters:   05/14/19 129/71   02/25/19 110/68   08/28/18 (!) 150/76    (goal /80)      All Future Testing planned in [facility]:  Lab Frequency Next Occurrence       Next Visit Date:  Future Appointments   Date Time Provider Vidhya Odom   6/24/2019  2:00 PM Yamilka Alvarez PA-C [id number] [place] [address]            Patient Active Problem List:     Hypertension     Lumbar spondylosis     Cerebral infarction [facility])     Lumbosacral ligament sprain     Normochromic normocytic anemia     Renal cell carcinoma (HCC)     Altered mental status     Dyspnea     Hyperkalemia     Kyphosis of thoracolumbar region     Schizoaffective disorder ([facility] [address]     Cerebral vascular disease     Left-sided weakness     Osteoarthritis of left shoulder     Mixed hyperlipidemia     Oropharyngeal dysphagia     Generalized weakness     ESRD on hemodialysis (HCC)     Suicidal ideation     Subtherapeutic serum dilantin level     Seizure disorder (HCC)     Frequent falls     Vascular dementia without behavioral disturbance     Delirium due to another medical condition     Wheelchair dependent     Hypoxia     Encounter for palliative care     Pneumonia     Dehydration with Patch Test     Read by: Blayne Tuttle MD     Panel Removal: 8/2/21  9:12 AM CDT            Read/Apply: Read 1       NAC-80    # Antigen Reaction Comment   1. Benzocaine Negative    2. 2-Mercaptobenzothiaxole (MBT) Negative    3. Colophonium / (Colophony) Negative    4. p-Phenylenediamine (PPD) Negative    5. Imidazolidinyl urea (Germall 115) Negative    6. Cinnamal / (Cinnamic aldehyde) Negative    7. Amerchol L 101 Negative    8. Carba mix Negative    9. Neomycin sulfate Negative    10. Thiuram mix Negative       11. Clobetasol-17-propionate Negative    12. Ethylenediamine dihydrochloride Negative    13. Epoxy resin Bisphenol A Negative    14. Quaternium-15 (Dowicil 200) / (1-(3-Chloroallyl)-3,5,6-nfbdak-6-azoniaadamantane chloride) Negative    15. 5-guku-Dtcddiigphgtmpbgpzrmups resin Negative    16. Mercapto mix Negative    17. N,N-Diphenylguanidine Negative    18. Potassium dichromate Negative    19. Myroxylon pereirae resin / (Balsam [place]) Strong positive    20. Nickelsulfate hexahydrate Negative       21. Diazolidinylurea (Germall II) Negative    22. Tocopherol/ (DL alpha tocopherol) Negative    23. Bacitracin Negative    24. Mixed dialkyl thiourea Negative    25. Disperse [place] 3 Negative    26. Paraben Mix Negative    27. Methyldibromo glutaronitrile (MDBGN) Negative    28. Fragrance mix Negative    29. Glutaral / (Glutaraldehyde) Negative    30. 2-Bromo-2-nitropropane-l,3-diol (Bronopol) Negative       31. Sesquiterpene lactone mix Negative    32. Thimerosal (Merthiolate) Negative    33. Propolis Negative    34. Benzophenone-3 / (2-Hydroxy-4-methoxybenzophenone) Negative    35. Chloroxylenol (PCMX) / (4-Chloro-3,5-xylenol (PCMX)) Negative    36. Ethyleneurea, melamine formaldehyde mix Negative    37. 2-tert-Butyl-4-methoxyphenol (BHA) Negative    38. Gold(I)sodium thiosulfate dihydrate Negative    39. Ethyl acrylate Negative    40. Glyceryl monothioglycolate (GMTG) Negative       41. Toluenesulfonamide  formaldehyde resin Negative    42. Methyl methacrylate Negative    43. Cobalt(II) chloride hexahydrate Negative    44. Tixocortol-21-pivalate Negative    45. Budesonide Negative    46. Cocamide VIOLA / (Coconut diethanolamide) Negative    47. Triethanolamine Negative    48. Textile dye mix Negative    49. Tea Tree Oil Negative    50. Fragrance Mix II Negative       51. Disperse Yellow 3 Negative    52. Benzyl salicylate Negative    53. Decyl Glucoside Negative    54. Methylisothiazolinone Negative    55. HEMA (2-Hydroxyethyl methacrylate) Negative    56. DMDM Hydantoin Negative    57. [place] odorata oil / (Ylang-Ylang oil) Negative    58. Benzyl alcohol Negative    59. Isopropyl myristate Negative    60. Hydroperoxides of Limonene Strong positive       61. Desoximetasone Negative    62. Polysorbate 80 / (Polyoxyethylene sorbitan monooleate(Tween 80)) Negative    63. Iodopropynyl butyl carbamate Negative    64. 2-n-Octyl-4-isothiazolin-3-one Negative    65. Disperse Blue 106/124 Mix Negative    66. Compositae Mix II Negative    67. Lidocaine Negative    68. Fusidic acid sodium salt Negative    69. Dibucaine hydrochloride Negative    70. Benzoylperoxide Negative       71. Isoamyl-p-methoxycinnamate Negative    72. Hydroxyisohexyl 3-Cyclohexene Carboxaldehyde (Lyral) Negative    73. Ethylhexyl Salicylate Negative    74. Hydroperoxides of Linalool Negative    75. Amidoamine Negative    76. Cocamidopropylbetaine Negative    77. Formaldehyde Negative    78. Methylisothiazolinone + Methylchloroisothiazolinone / (Cl+-Me-isothiazolinone (Kathon CG 200ppm)) Negative    79. Propylene glycol Negative    80. Oleamidopropyl dimethylamine Negative         Myroxylon pereirae resin / (Balsam [place]) and Hydroperoxides of Limonene.  Strong positive reactions.    Initial information provided.      Blayne Tuttle MD         hyponatremia     Anemia of chronic kidney failure

## 2023-08-31 NOTE — ED PROVIDER NOTES
FACULTY SIGN-OUT  ADDENDUM     Care of this patient was assumed from previous attending physician. The patient was seen for Fatigue and Fall (left AMA yesterday, falling down at home and generally weak. last dialysis treatment was Friday)  . The patient's initial evaluation and plan have been discussed with the prior provider who initially evaluated the patient. ED COURSE      The patient was given the following medications:  Orders Placed This Encounter   Medications    lidocaine (LMX) 4 % cream       RECENT VITALS:   Temp: 98.1 °F (36.7 °C), Pulse: 101, Resp: 23, BP: (!) 171/76    MEDICAL DECISION MAKING        Bethany Mendez is a 69 y.o. male who presents to the Emergency Department with complaints of Fatigue. The patient had a recent NSTEMI. Patient noted to have low oxygen saturations at home and on initial evaluation the emergency department. Patient is currently undergoing workup for possible congestive heart failure versus pulmonary embolism versus worsening of NSTEMI      Shiva Romero M.D., [facility]  Emergency Medicine Attending         Parker Adame MD  02/12/18 [id number] No